# Patient Record
Sex: MALE | Race: ASIAN | NOT HISPANIC OR LATINO | URBAN - METROPOLITAN AREA
[De-identification: names, ages, dates, MRNs, and addresses within clinical notes are randomized per-mention and may not be internally consistent; named-entity substitution may affect disease eponyms.]

---

## 2017-09-25 ENCOUNTER — OFFICE VISIT - HEALTHEAST (OUTPATIENT)
Dept: FAMILY MEDICINE | Facility: CLINIC | Age: 22
End: 2017-09-25

## 2017-09-25 DIAGNOSIS — M10.9 GOUT: ICD-10-CM

## 2017-09-25 DIAGNOSIS — M25.562 ACUTE PAIN OF LEFT KNEE: ICD-10-CM

## 2017-09-25 ASSESSMENT — MIFFLIN-ST. JEOR: SCORE: 1746.93

## 2017-10-18 ENCOUNTER — RECORDS - HEALTHEAST (OUTPATIENT)
Dept: ADMINISTRATIVE | Facility: OTHER | Age: 22
End: 2017-10-18

## 2017-10-18 ENCOUNTER — OFFICE VISIT - HEALTHEAST (OUTPATIENT)
Dept: FAMILY MEDICINE | Facility: CLINIC | Age: 22
End: 2017-10-18

## 2017-10-18 DIAGNOSIS — M54.9 MID BACK PAIN ON LEFT SIDE: ICD-10-CM

## 2017-10-19 ENCOUNTER — RECORDS - HEALTHEAST (OUTPATIENT)
Dept: ADMINISTRATIVE | Facility: OTHER | Age: 22
End: 2017-10-19

## 2018-06-19 ENCOUNTER — COMMUNICATION - HEALTHEAST (OUTPATIENT)
Dept: SCHEDULING | Facility: CLINIC | Age: 23
End: 2018-06-19

## 2018-06-19 ENCOUNTER — OFFICE VISIT - HEALTHEAST (OUTPATIENT)
Dept: FAMILY MEDICINE | Facility: CLINIC | Age: 23
End: 2018-06-19

## 2018-06-19 DIAGNOSIS — M10.9 GOUT: ICD-10-CM

## 2018-06-19 DIAGNOSIS — F17.200 TOBACCO USE DISORDER: ICD-10-CM

## 2018-06-19 NOTE — ASSESSMENT & PLAN NOTE
Daily social smoking.  Pre-contemplative  Motivational interviewing, discussed risk of smoking, likely negron of addiction

## 2018-06-19 NOTE — ASSESSMENT & PLAN NOTE
Most likely diagnosis given pre-existing condition and acute arthritis MCP joint #1 and #2 Colchicine  Follow-up: 1 month to consider allopurinol  Comment: Handout given on dietary measures

## 2018-06-21 ENCOUNTER — OFFICE VISIT - HEALTHEAST (OUTPATIENT)
Dept: FAMILY MEDICINE | Facility: CLINIC | Age: 23
End: 2018-06-21

## 2018-06-21 DIAGNOSIS — M13.10 ACUTE MONOARTHRITIS: ICD-10-CM

## 2018-06-21 LAB
C REACTIVE PROTEIN LHE: 0.6 MG/DL (ref 0–0.8)
ERYTHROCYTE [DISTWIDTH] IN BLOOD BY AUTOMATED COUNT: 12.6 % (ref 11–14.5)
HCT VFR BLD AUTO: 46.3 % (ref 40–54)
HGB BLD-MCNC: 15.2 G/DL (ref 14–18)
MCH RBC QN AUTO: 28.9 PG (ref 27–34)
MCHC RBC AUTO-ENTMCNC: 32.9 G/DL (ref 32–36)
MCV RBC AUTO: 88 FL (ref 80–100)
PLATELET # BLD AUTO: 255 THOU/UL (ref 140–440)
PMV BLD AUTO: 7.2 FL (ref 7–10)
RBC # BLD AUTO: 5.28 MILL/UL (ref 4.4–6.2)
WBC: 7.8 THOU/UL (ref 4–11)

## 2018-06-21 NOTE — ASSESSMENT & PLAN NOTE
Inflammatory, MCP joint right thumb  Responding well to colchicine, patient with history of presumed gout  Afebrile, white blood cell count normal, CRP pending.  X-ray ordered but not done today.  He is coming back tomorrow morning for this.  Will make an addendum  Plan: Switch to prednisone thumb spica splint  Follow-up: With x-ray results.  Probably rest with the splint, prednisone and follow-up with rheumatology if not improving

## 2018-06-22 ENCOUNTER — RECORDS - HEALTHEAST (OUTPATIENT)
Dept: GENERAL RADIOLOGY | Facility: CLINIC | Age: 23
End: 2018-06-22

## 2018-06-22 DIAGNOSIS — M13.10 MONOARTHRITIS, NOT ELSEWHERE CLASSIFIED, UNSPECIFIED SITE: ICD-10-CM

## 2018-06-28 ENCOUNTER — COMMUNICATION - HEALTHEAST (OUTPATIENT)
Dept: FAMILY MEDICINE | Facility: CLINIC | Age: 23
End: 2018-06-28

## 2018-06-28 ENCOUNTER — AMBULATORY - HEALTHEAST (OUTPATIENT)
Dept: FAMILY MEDICINE | Facility: CLINIC | Age: 23
End: 2018-06-28

## 2018-06-28 DIAGNOSIS — M10.9 GOUT ATTACK: ICD-10-CM

## 2018-07-18 ENCOUNTER — COMMUNICATION - HEALTHEAST (OUTPATIENT)
Dept: FAMILY MEDICINE | Facility: CLINIC | Age: 23
End: 2018-07-18

## 2018-07-18 DIAGNOSIS — M10.9 GOUT ATTACK: ICD-10-CM

## 2018-08-09 ENCOUNTER — OFFICE VISIT - HEALTHEAST (OUTPATIENT)
Dept: FAMILY MEDICINE | Facility: CLINIC | Age: 23
End: 2018-08-09

## 2018-08-09 ENCOUNTER — RECORDS - HEALTHEAST (OUTPATIENT)
Dept: GENERAL RADIOLOGY | Facility: CLINIC | Age: 23
End: 2018-08-09

## 2018-08-09 DIAGNOSIS — M79.645 PAIN IN LEFT FINGER(S): ICD-10-CM

## 2018-08-09 DIAGNOSIS — M79.645 FINGER PAIN, LEFT: ICD-10-CM

## 2018-08-16 ENCOUNTER — OFFICE VISIT - HEALTHEAST (OUTPATIENT)
Dept: FAMILY MEDICINE | Facility: CLINIC | Age: 23
End: 2018-08-16

## 2018-08-16 DIAGNOSIS — M21.42 PES PLANUS OF BOTH FEET: ICD-10-CM

## 2018-08-16 DIAGNOSIS — M1A.9XX1 TOPHACEOUS GOUT: ICD-10-CM

## 2018-08-16 DIAGNOSIS — M21.41 PES PLANUS OF BOTH FEET: ICD-10-CM

## 2018-08-16 LAB
ALBUMIN SERPL-MCNC: 4.1 G/DL (ref 3.5–5)
ALP SERPL-CCNC: 82 U/L (ref 45–120)
ALT SERPL W P-5'-P-CCNC: 37 U/L (ref 0–45)
ANION GAP SERPL CALCULATED.3IONS-SCNC: 10 MMOL/L (ref 5–18)
AST SERPL W P-5'-P-CCNC: 21 U/L (ref 0–40)
BASOPHILS # BLD AUTO: 0.1 THOU/UL (ref 0–0.2)
BASOPHILS NFR BLD AUTO: 1 % (ref 0–2)
BILIRUB SERPL-MCNC: 0.5 MG/DL (ref 0–1)
BUN SERPL-MCNC: 16 MG/DL (ref 8–22)
CALCIUM SERPL-MCNC: 9.9 MG/DL (ref 8.5–10.5)
CHLORIDE BLD-SCNC: 108 MMOL/L (ref 98–107)
CO2 SERPL-SCNC: 24 MMOL/L (ref 22–31)
CREAT SERPL-MCNC: 0.82 MG/DL (ref 0.7–1.3)
EOSINOPHIL # BLD AUTO: 0.2 THOU/UL (ref 0–0.4)
EOSINOPHIL NFR BLD AUTO: 3 % (ref 0–6)
ERYTHROCYTE [DISTWIDTH] IN BLOOD BY AUTOMATED COUNT: 12.2 % (ref 11–14.5)
GFR SERPL CREATININE-BSD FRML MDRD: >60 ML/MIN/1.73M2
GLUCOSE BLD-MCNC: 87 MG/DL (ref 70–125)
HCT VFR BLD AUTO: 44 % (ref 40–54)
HGB BLD-MCNC: 14.6 G/DL (ref 14–18)
LYMPHOCYTES # BLD AUTO: 2.5 THOU/UL (ref 0.8–4.4)
LYMPHOCYTES NFR BLD AUTO: 34 % (ref 20–40)
MCH RBC QN AUTO: 28.2 PG (ref 27–34)
MCHC RBC AUTO-ENTMCNC: 33.2 G/DL (ref 32–36)
MCV RBC AUTO: 85 FL (ref 80–100)
MONOCYTES # BLD AUTO: 0.6 THOU/UL (ref 0–0.9)
MONOCYTES NFR BLD AUTO: 8 % (ref 2–10)
NEUTROPHILS # BLD AUTO: 4.1 THOU/UL (ref 2–7.7)
NEUTROPHILS NFR BLD AUTO: 55 % (ref 50–70)
PLATELET # BLD AUTO: 230 THOU/UL (ref 140–440)
PMV BLD AUTO: 7.3 FL (ref 7–10)
POTASSIUM BLD-SCNC: 4.2 MMOL/L (ref 3.5–5)
PROT SERPL-MCNC: 7.7 G/DL (ref 6–8)
RBC # BLD AUTO: 5.17 MILL/UL (ref 4.4–6.2)
SODIUM SERPL-SCNC: 142 MMOL/L (ref 136–145)
URATE SERPL-MCNC: 13.2 MG/DL (ref 3–8)
WBC: 7.5 THOU/UL (ref 4–11)

## 2018-08-17 ENCOUNTER — COMMUNICATION - HEALTHEAST (OUTPATIENT)
Dept: FAMILY MEDICINE | Facility: CLINIC | Age: 23
End: 2018-08-17

## 2018-08-21 ENCOUNTER — OFFICE VISIT - HEALTHEAST (OUTPATIENT)
Dept: FAMILY MEDICINE | Facility: CLINIC | Age: 23
End: 2018-08-21

## 2018-08-21 DIAGNOSIS — M25.562 ACUTE PAIN OF LEFT KNEE: ICD-10-CM

## 2018-08-21 DIAGNOSIS — M25.862 PATELLOFEMORAL DYSFUNCTION OF LEFT KNEE: ICD-10-CM

## 2018-08-23 ENCOUNTER — COMMUNICATION - HEALTHEAST (OUTPATIENT)
Dept: FAMILY MEDICINE | Facility: CLINIC | Age: 23
End: 2018-08-23

## 2018-08-28 ENCOUNTER — COMMUNICATION - HEALTHEAST (OUTPATIENT)
Dept: FAMILY MEDICINE | Facility: CLINIC | Age: 23
End: 2018-08-28

## 2018-08-28 DIAGNOSIS — M1A.9XX1 TOPHACEOUS GOUT: ICD-10-CM

## 2018-09-26 ENCOUNTER — OFFICE VISIT - HEALTHEAST (OUTPATIENT)
Dept: FAMILY MEDICINE | Facility: CLINIC | Age: 23
End: 2018-09-26

## 2018-09-26 DIAGNOSIS — F17.200 TOBACCO USE DISORDER: ICD-10-CM

## 2018-09-26 DIAGNOSIS — M10.9 GOUT: ICD-10-CM

## 2018-09-26 DIAGNOSIS — Z00.00 HEALTHCARE MAINTENANCE: ICD-10-CM

## 2018-09-26 LAB
ALT SERPL W P-5'-P-CCNC: 91 U/L (ref 0–45)
CREAT SERPL-MCNC: 0.81 MG/DL (ref 0.7–1.3)
ERYTHROCYTE [DISTWIDTH] IN BLOOD BY AUTOMATED COUNT: 11.6 % (ref 11–14.5)
GFR SERPL CREATININE-BSD FRML MDRD: >60 ML/MIN/1.73M2
HCT VFR BLD AUTO: 45.6 % (ref 40–54)
HGB BLD-MCNC: 14.7 G/DL (ref 14–18)
MCH RBC QN AUTO: 27.3 PG (ref 27–34)
MCHC RBC AUTO-ENTMCNC: 32.3 G/DL (ref 32–36)
MCV RBC AUTO: 85 FL (ref 80–100)
PLATELET # BLD AUTO: 300 THOU/UL (ref 140–440)
PMV BLD AUTO: 7.2 FL (ref 7–10)
RBC # BLD AUTO: 5.39 MILL/UL (ref 4.4–6.2)
URATE SERPL-MCNC: 13.4 MG/DL (ref 3–8)
WBC: 11.4 THOU/UL (ref 4–11)

## 2018-09-26 NOTE — ASSESSMENT & PLAN NOTE
Smoking cessation  Greater than 5 minutes spent discussing smoking cessation today.  Patient is in the contemplation - ambivalent about change regarding quitting.  Patient has quit before, most recent times got irritable when he tried to quit  Motivational interview technique used to explore patients desire to quit.  In the end, decided on the following intervention  Will set quit date in next 2 weeks?  Not ready for this

## 2018-09-26 NOTE — ASSESSMENT & PLAN NOTE
MTP joints, right foot, 2 weeks.  Takes allopurinol daily, previously colchicine as needed  Not sure what has worked well in the past for outbreaks but knows that naproxen has not    Plan  Continue daily allopurinol and check uric acid  Add daily colchicine for prevention  Check ALT, creatinine and hemogram  Prednisone today for acute attack, refills for future use  Discussed side effects of this medication

## 2018-09-30 ENCOUNTER — COMMUNICATION - HEALTHEAST (OUTPATIENT)
Dept: FAMILY MEDICINE | Facility: CLINIC | Age: 23
End: 2018-09-30

## 2018-10-18 ENCOUNTER — COMMUNICATION - HEALTHEAST (OUTPATIENT)
Dept: FAMILY MEDICINE | Facility: CLINIC | Age: 23
End: 2018-10-18

## 2018-11-07 ENCOUNTER — OFFICE VISIT - HEALTHEAST (OUTPATIENT)
Dept: FAMILY MEDICINE | Facility: CLINIC | Age: 23
End: 2018-11-07

## 2018-11-07 DIAGNOSIS — Z00.00 HEALTHCARE MAINTENANCE: ICD-10-CM

## 2018-11-07 DIAGNOSIS — M10.021 ACUTE IDIOPATHIC GOUT OF RIGHT ELBOW: ICD-10-CM

## 2018-11-07 NOTE — ASSESSMENT & PLAN NOTE
With current exacerbation.  Right elbow  Unable to get colchicine covered, prednisonenot effective, naproxen not effective, will go with 50 mg indomethacin  Medications taken for gout:  Uric acid lowering: Allopurinol, continue this, check labs next follow-up  Lab Results   Component Value Date    URICACID 13.4 (H) 09/26/2018     Lab Results   Component Value Date    CREATININE 0.81 09/26/2018     Rescue/relief: Indomethacin will be tried at this time  Plan: Treat acute attack, follow-up 3-month

## 2018-11-30 ENCOUNTER — OFFICE VISIT - HEALTHEAST (OUTPATIENT)
Dept: FAMILY MEDICINE | Facility: CLINIC | Age: 23
End: 2018-11-30

## 2018-11-30 DIAGNOSIS — M25.571 RIGHT ANKLE PAIN, UNSPECIFIED CHRONICITY: ICD-10-CM

## 2018-11-30 ASSESSMENT — MIFFLIN-ST. JEOR: SCORE: 1806.24

## 2018-12-03 ENCOUNTER — COMMUNICATION - HEALTHEAST (OUTPATIENT)
Dept: FAMILY MEDICINE | Facility: CLINIC | Age: 23
End: 2018-12-03

## 2018-12-03 ENCOUNTER — OFFICE VISIT - HEALTHEAST (OUTPATIENT)
Dept: FAMILY MEDICINE | Facility: CLINIC | Age: 23
End: 2018-12-03

## 2018-12-03 DIAGNOSIS — M10.9 ACUTE GOUT OF RIGHT ANKLE, UNSPECIFIED CAUSE: ICD-10-CM

## 2018-12-03 LAB — URATE SERPL-MCNC: 8.6 MG/DL (ref 3–8)

## 2018-12-11 ENCOUNTER — OFFICE VISIT - HEALTHEAST (OUTPATIENT)
Dept: INTERNAL MEDICINE | Facility: CLINIC | Age: 23
End: 2018-12-11

## 2018-12-11 DIAGNOSIS — M25.511 ACUTE PAIN OF RIGHT SHOULDER: ICD-10-CM

## 2018-12-11 ASSESSMENT — MIFFLIN-ST. JEOR: SCORE: 1809.07

## 2018-12-13 ENCOUNTER — OFFICE VISIT - HEALTHEAST (OUTPATIENT)
Dept: FAMILY MEDICINE | Facility: CLINIC | Age: 23
End: 2018-12-13

## 2018-12-13 DIAGNOSIS — M10.021 ACUTE IDIOPATHIC GOUT OF RIGHT ELBOW: ICD-10-CM

## 2018-12-13 DIAGNOSIS — M10.011 ACUTE IDIOPATHIC GOUT OF RIGHT SHOULDER: ICD-10-CM

## 2018-12-13 NOTE — ASSESSMENT & PLAN NOTE
This patient most likely has gout  Previous MTP joint swelling, however gradual onset  Very elevated uric acid    Inconsistencies with improvement with anti-inflammatories    He now is having frequent outbreaks of what ever intra-articular process is causing him problem  Presume this is gout with instigation of allopurinol treatment causing recurrent outbreaks.  Patient is not on national prophylactic therapy because insurance would not cover colchicine    Plan  For now, prolonged prednisone taper to knock out current older symptoms  Start daily colchicine, will likely require prior authorization  Referral to rheumatology  Discussed all of this with him.  Did prescribe 15 hydrocodone.  Stressed that this is not a good long-term option

## 2018-12-27 ENCOUNTER — TELEPHONE (OUTPATIENT)
Dept: RHEUMATOLOGY | Facility: CLINIC | Age: 23
End: 2018-12-27

## 2018-12-27 NOTE — TELEPHONE ENCOUNTER
REFERRAL INFORMATION            Referring provider: Evens Buenrostro MD    Referring Clinic: [facility] Phone: [phone] Fax: [phone]    Reason for Referral: idiopathic gout       RECORDS NEEDED FROM:         Clinic name Comments Records Status    [facility]  CE needed

## 2019-01-28 ENCOUNTER — TELEPHONE (OUTPATIENT)
Dept: RHEUMATOLOGY | Facility: CLINIC | Age: 24
End: 2019-01-28

## 2019-01-28 NOTE — TELEPHONE ENCOUNTER
1/28/2019  11:37 AM        RN Care Coordinator Encounter   This RN CC attempted to connect with patient for follow up on Rheumatology referral by Dr. Buenrostro at [facility] for idiopathic gout. No answer. Left a voicemail for patient to contact this RN directly at [phone] for further review of the intake process.         Evelyn Calderon RN, BSN, PHN  [facility]  RN Care Coordinator Medicine Specialty Pool Nurse   (Nephrology, Rheumatology, Infectious Disease & Hepatology)

## 2019-01-30 ENCOUNTER — OFFICE VISIT - HEALTHEAST (OUTPATIENT)
Dept: FAMILY MEDICINE | Facility: CLINIC | Age: 24
End: 2019-01-30

## 2019-01-30 ENCOUNTER — COMMUNICATION - HEALTHEAST (OUTPATIENT)
Dept: FAMILY MEDICINE | Facility: CLINIC | Age: 24
End: 2019-01-30

## 2019-01-30 DIAGNOSIS — M10.011 ACUTE IDIOPATHIC GOUT OF RIGHT SHOULDER: ICD-10-CM

## 2019-01-30 NOTE — ASSESSMENT & PLAN NOTE
Recurrence of right shoulder pain arthritis, presumed gout  Prednisone 50 mg for 5 days, refill Vicodin No. 15    Attempt forprior authorization for colchicine to add allopurinol for prophylaxis    Patient is not yet seen or heard from the rheumatologist.  They attempted to contact him according to the chart.  Will have him work with our  to try to get an appointment.

## 2019-02-01 NOTE — TELEPHONE ENCOUNTER
2/1/2019  10:27 AM         RN Care Coordinator Encounter   2nd attempt to contact patient for referral intake process to the Rheumatology clinic. NO answer. Intake process form will be sent via mail. Will wait for patient to return call or mail back intake form questionnaire.        Evelyn Calderon RN, BSN, PHN  [facility]  RN Care Coordinator Medicine Specialty Pool Nurse   (Nephrology, Rheumatology, Infectious Disease & Hepatology)

## 2019-03-15 ENCOUNTER — AMBULATORY - HEALTHEAST (OUTPATIENT)
Dept: FAMILY MEDICINE | Facility: CLINIC | Age: 24
End: 2019-03-15

## 2019-03-15 ENCOUNTER — COMMUNICATION - HEALTHEAST (OUTPATIENT)
Dept: FAMILY MEDICINE | Facility: CLINIC | Age: 24
End: 2019-03-15

## 2019-03-19 ENCOUNTER — OFFICE VISIT - HEALTHEAST (OUTPATIENT)
Dept: FAMILY MEDICINE | Facility: CLINIC | Age: 24
End: 2019-03-19

## 2019-03-19 DIAGNOSIS — M10.9 ACUTE GOUT OF LEFT WRIST, UNSPECIFIED CAUSE: ICD-10-CM

## 2019-03-28 ENCOUNTER — COMMUNICATION - HEALTHEAST (OUTPATIENT)
Dept: FAMILY MEDICINE | Facility: CLINIC | Age: 24
End: 2019-03-28

## 2019-03-28 DIAGNOSIS — M10.9 ACUTE GOUT OF LEFT WRIST, UNSPECIFIED CAUSE: ICD-10-CM

## 2019-04-02 ENCOUNTER — TELEPHONE (OUTPATIENT)
Dept: RHEUMATOLOGY | Facility: CLINIC | Age: 24
End: 2019-04-02

## 2019-04-02 NOTE — TELEPHONE ENCOUNTER
Referral has been received in the Rheumatology clinic on 3/15/2019 from Evens Buenrostro for gout of right shoulder. Our referral process is as follows:     The Rheumatology Clinic will follow up with the patient in 2-3 weeks to start the intake process by completing an intake form and discuss their medical history with them over the phone. In some cases, a Rheumatologist here at [facility] may not be the right doctor to for the patient. Alternative options will be suggested if that is the case.     Records from the referring provider are available via Care Everywhere for   [facility].     Dulce Lemons CMA  4/2/2019 3:02 PM

## 2019-04-05 ENCOUNTER — COMMUNICATION - HEALTHEAST (OUTPATIENT)
Dept: FAMILY MEDICINE | Facility: CLINIC | Age: 24
End: 2019-04-05

## 2019-04-26 NOTE — TELEPHONE ENCOUNTER
Spoke with patient who stated that he was not able to talk right now and will call back the clinic when he can.  Dulce Lemons CMA  4/26/2019 3:33 PM

## 2019-06-11 ENCOUNTER — OFFICE VISIT - HEALTHEAST (OUTPATIENT)
Dept: FAMILY MEDICINE | Facility: CLINIC | Age: 24
End: 2019-06-11

## 2019-06-11 DIAGNOSIS — M10.011 ACUTE IDIOPATHIC GOUT OF RIGHT SHOULDER: ICD-10-CM

## 2019-06-11 DIAGNOSIS — M10.9 ACUTE GOUTY ARTHRITIS: ICD-10-CM

## 2019-06-11 NOTE — ASSESSMENT & PLAN NOTE
Right knee-arthrocentesis proven  Patient recently seen, treated with indomethacin and tramadol.  He has not picked up any of the medications and complains that pain is quite severe.  Suggest that when he is treated with Vicodin works well so that he can function until the prednisone works.    Still, he is not focusing on reduction in inflammation with each episode.  He isnot giving anti-inflammatories a real chance     For this episode, I increase the number of tramadol that were provided by the emergency room to 20.  Renewed indomethacin, see assessment for gout for further plan

## 2019-06-11 NOTE — ASSESSMENT & PLAN NOTE
Gout Is very poorly controlled  Patient not on any medications  Medications taken for gout:  Uric acid lowering: None, previously on allopurinol which brought his uric acid level to what is listed below from 13.  Hesitant to use this medication because it seems to cause gout.  Has not been able to couplet with colchicine because of his insurance, but insurance may be changing soon  Lab Results   Component Value Date    URICACID 8.6 (H) 12/03/2018     Lab Results   Component Value Date    CREATININE 0.81 09/26/2018     Rescue/relief: Prescribed indomethacin today  Plan: Treat current outbreak in right knee with indomethacin  Prescribed colchicine 0.6 mg, begin whenever possible  Start allopurinol in 2 weeks, 300 mg daily, colchicine for 6 months to get allopurinol going.  Follow-up 6 weeks, recheck uric acid, discussed goal of uric acid less than 6

## 2019-07-29 ENCOUNTER — OFFICE VISIT - HEALTHEAST (OUTPATIENT)
Dept: FAMILY MEDICINE | Facility: CLINIC | Age: 24
End: 2019-07-29

## 2019-07-29 DIAGNOSIS — M10.9 ACUTE GOUT OF LEFT WRIST, UNSPECIFIED CAUSE: ICD-10-CM

## 2019-08-06 ENCOUNTER — RECORDS - HEALTHEAST (OUTPATIENT)
Dept: FAMILY MEDICINE | Facility: CLINIC | Age: 24
End: 2019-08-06

## 2019-08-06 ENCOUNTER — COMMUNICATION - HEALTHEAST (OUTPATIENT)
Dept: FAMILY MEDICINE | Facility: CLINIC | Age: 24
End: 2019-08-06

## 2019-08-28 ENCOUNTER — COMMUNICATION - HEALTHEAST (OUTPATIENT)
Dept: FAMILY MEDICINE | Facility: CLINIC | Age: 24
End: 2019-08-28

## 2019-08-28 DIAGNOSIS — M10.9 ACUTE GOUT OF LEFT WRIST, UNSPECIFIED CAUSE: ICD-10-CM

## 2019-09-09 ENCOUNTER — COMMUNICATION - HEALTHEAST (OUTPATIENT)
Dept: FAMILY MEDICINE | Facility: CLINIC | Age: 24
End: 2019-09-09

## 2019-09-09 DIAGNOSIS — M10.9 ACUTE GOUT OF LEFT WRIST, UNSPECIFIED CAUSE: ICD-10-CM

## 2019-09-25 ENCOUNTER — OFFICE VISIT - HEALTHEAST (OUTPATIENT)
Dept: FAMILY MEDICINE | Facility: CLINIC | Age: 24
End: 2019-09-25

## 2019-09-25 DIAGNOSIS — M10.9 ACUTE GOUTY ARTHRITIS: ICD-10-CM

## 2019-10-01 ENCOUNTER — OFFICE VISIT - HEALTHEAST (OUTPATIENT)
Dept: FAMILY MEDICINE | Facility: CLINIC | Age: 24
End: 2019-10-01

## 2019-10-01 DIAGNOSIS — Z09 HOSPITAL DISCHARGE FOLLOW-UP: ICD-10-CM

## 2019-10-01 DIAGNOSIS — M10.011 ACUTE IDIOPATHIC GOUT OF RIGHT SHOULDER: ICD-10-CM

## 2019-10-01 DIAGNOSIS — M10.9 ACUTE GOUTY ARTHRITIS: ICD-10-CM

## 2019-10-01 LAB
ALT SERPL W P-5'-P-CCNC: 44 U/L (ref 0–45)
CREAT SERPL-MCNC: 0.76 MG/DL (ref 0.7–1.3)
ERYTHROCYTE [DISTWIDTH] IN BLOOD BY AUTOMATED COUNT: 12.7 % (ref 11–14.5)
GFR SERPL CREATININE-BSD FRML MDRD: >60 ML/MIN/1.73M2
HCT VFR BLD AUTO: 41.7 % (ref 40–54)
HGB BLD-MCNC: 13.6 G/DL (ref 14–18)
MCH RBC QN AUTO: 26.8 PG (ref 27–34)
MCHC RBC AUTO-ENTMCNC: 32.5 G/DL (ref 32–36)
MCV RBC AUTO: 82 FL (ref 80–100)
PLATELET # BLD AUTO: 319 THOU/UL (ref 140–440)
PMV BLD AUTO: 6.9 FL (ref 7–10)
RBC # BLD AUTO: 5.06 MILL/UL (ref 4.4–6.2)
URATE SERPL-MCNC: 6.6 MG/DL (ref 3–8)
WBC: 11.1 THOU/UL (ref 4–11)

## 2019-10-01 ASSESSMENT — MIFFLIN-ST. JEOR: SCORE: 1745.57

## 2019-10-01 NOTE — ASSESSMENT & PLAN NOTE
Gout Is not well controlled     Medications taken for gout:  Uric acid lowering: Allopurinol 300, now states that he has had 1 month adherence  Lab Results   Component Value Date    URICACID 10.4 (H) 08/27/2019     Lab Results   Component Value Date    CREATININE 0.81 08/27/2019     Rescue/relief: Indomethacin/prednisone  Plan: Patient is going to follow-up with rheumatology.  In the meantime, I did check labs today.  Discussed goal of uric acid less than 6 and if we can get there with indomethacin, sitter either probenecid or, Uloric

## 2019-10-02 ENCOUNTER — COMMUNICATION - HEALTHEAST (OUTPATIENT)
Dept: FAMILY MEDICINE | Facility: CLINIC | Age: 24
End: 2019-10-02

## 2019-10-30 ENCOUNTER — OFFICE VISIT - HEALTHEAST (OUTPATIENT)
Dept: FAMILY MEDICINE | Facility: CLINIC | Age: 24
End: 2019-10-30

## 2019-10-30 DIAGNOSIS — M62.830 BACK MUSCLE SPASM: ICD-10-CM

## 2019-11-18 ENCOUNTER — OFFICE VISIT - HEALTHEAST (OUTPATIENT)
Dept: FAMILY MEDICINE | Facility: CLINIC | Age: 24
End: 2019-11-18

## 2019-11-18 DIAGNOSIS — S49.91XA INJURY OF RIGHT UPPER EXTREMITY, INITIAL ENCOUNTER: ICD-10-CM

## 2019-11-18 DIAGNOSIS — S40.811A ABRASION OF RIGHT UPPER EXTREMITY, INITIAL ENCOUNTER: ICD-10-CM

## 2019-11-18 DIAGNOSIS — M25.421 EFFUSION OF RIGHT ELBOW: ICD-10-CM

## 2019-12-04 ENCOUNTER — OFFICE VISIT - HEALTHEAST (OUTPATIENT)
Dept: FAMILY MEDICINE | Facility: CLINIC | Age: 24
End: 2019-12-04

## 2019-12-04 DIAGNOSIS — M10.041 ACUTE IDIOPATHIC GOUT OF RIGHT HAND: ICD-10-CM

## 2019-12-05 ENCOUNTER — COMMUNICATION - HEALTHEAST (OUTPATIENT)
Dept: ADMINISTRATIVE | Facility: CLINIC | Age: 24
End: 2019-12-05

## 2019-12-05 DIAGNOSIS — M10.041 ACUTE IDIOPATHIC GOUT OF RIGHT HAND: ICD-10-CM

## 2020-01-09 ENCOUNTER — OFFICE VISIT - HEALTHEAST (OUTPATIENT)
Dept: FAMILY MEDICINE | Facility: CLINIC | Age: 25
End: 2020-01-09

## 2020-01-09 DIAGNOSIS — M1A.9XX1 TOPHACEOUS GOUT: ICD-10-CM

## 2020-02-20 ENCOUNTER — OFFICE VISIT - HEALTHEAST (OUTPATIENT)
Dept: FAMILY MEDICINE | Facility: CLINIC | Age: 25
End: 2020-02-20

## 2020-02-20 DIAGNOSIS — M1A.09X0 CHRONIC GOUT OF MULTIPLE SITES, UNSPECIFIED CAUSE: ICD-10-CM

## 2020-02-20 DIAGNOSIS — M1A.9XX1 TOPHACEOUS GOUT: ICD-10-CM

## 2020-10-22 ENCOUNTER — OFFICE VISIT - HEALTHEAST (OUTPATIENT)
Dept: FAMILY MEDICINE | Facility: CLINIC | Age: 25
End: 2020-10-22

## 2020-10-22 DIAGNOSIS — M10.9 ACUTE GOUTY ARTHRITIS: ICD-10-CM

## 2020-12-24 ENCOUNTER — OFFICE VISIT - HEALTHEAST (OUTPATIENT)
Dept: FAMILY MEDICINE | Facility: CLINIC | Age: 25
End: 2020-12-24

## 2020-12-24 DIAGNOSIS — M10.061 ACUTE IDIOPATHIC GOUT OF RIGHT KNEE: ICD-10-CM

## 2021-01-05 ENCOUNTER — OFFICE VISIT - HEALTHEAST (OUTPATIENT)
Dept: FAMILY MEDICINE | Facility: CLINIC | Age: 26
End: 2021-01-05

## 2021-01-05 DIAGNOSIS — M10.9 ACUTE GOUTY ARTHRITIS: ICD-10-CM

## 2021-01-20 ENCOUNTER — OFFICE VISIT - HEALTHEAST (OUTPATIENT)
Dept: FAMILY MEDICINE | Facility: CLINIC | Age: 26
End: 2021-01-20

## 2021-01-20 DIAGNOSIS — M10.032 ACUTE IDIOPATHIC GOUT OF LEFT WRIST: ICD-10-CM

## 2021-01-20 LAB
ALBUMIN SERPL-MCNC: 4.2 G/DL (ref 3.5–5)
ALP SERPL-CCNC: 80 U/L (ref 45–120)
ALT SERPL W P-5'-P-CCNC: 34 U/L (ref 0–45)
ANION GAP SERPL CALCULATED.3IONS-SCNC: 9 MMOL/L (ref 5–18)
AST SERPL W P-5'-P-CCNC: 24 U/L (ref 0–40)
BASOPHILS # BLD AUTO: 0.1 THOU/UL (ref 0–0.2)
BASOPHILS NFR BLD AUTO: 1 % (ref 0–2)
BILIRUB SERPL-MCNC: 0.4 MG/DL (ref 0–1)
BUN SERPL-MCNC: 16 MG/DL (ref 8–22)
CALCIUM SERPL-MCNC: 9.2 MG/DL (ref 8.5–10.5)
CHLORIDE BLD-SCNC: 105 MMOL/L (ref 98–107)
CO2 SERPL-SCNC: 29 MMOL/L (ref 22–31)
CREAT SERPL-MCNC: 0.81 MG/DL (ref 0.7–1.3)
EOSINOPHIL # BLD AUTO: 0.3 THOU/UL (ref 0–0.4)
EOSINOPHIL NFR BLD AUTO: 3 % (ref 0–6)
ERYTHROCYTE [DISTWIDTH] IN BLOOD BY AUTOMATED COUNT: 13.7 % (ref 11–14.5)
GFR SERPL CREATININE-BSD FRML MDRD: >60 ML/MIN/1.73M2
GLUCOSE BLD-MCNC: 90 MG/DL (ref 70–125)
HCT VFR BLD AUTO: 45.2 % (ref 40–54)
HGB BLD-MCNC: 15.1 G/DL (ref 14–18)
LYMPHOCYTES # BLD AUTO: 1.9 THOU/UL (ref 0.8–4.4)
LYMPHOCYTES NFR BLD AUTO: 19 % (ref 20–40)
MCH RBC QN AUTO: 28.8 PG (ref 27–34)
MCHC RBC AUTO-ENTMCNC: 33.4 G/DL (ref 32–36)
MCV RBC AUTO: 86 FL (ref 80–100)
MONOCYTES # BLD AUTO: 0.9 THOU/UL (ref 0–0.9)
MONOCYTES NFR BLD AUTO: 9 % (ref 2–10)
NEUTROPHILS # BLD AUTO: 7 THOU/UL (ref 2–7.7)
NEUTROPHILS NFR BLD AUTO: 69 % (ref 50–70)
PLATELET # BLD AUTO: 227 THOU/UL (ref 140–440)
PMV BLD AUTO: 7.6 FL (ref 7–10)
POTASSIUM BLD-SCNC: 4.4 MMOL/L (ref 3.5–5)
PROT SERPL-MCNC: 7.8 G/DL (ref 6–8)
RBC # BLD AUTO: 5.25 MILL/UL (ref 4.4–6.2)
RHEUMATOID FACT SERPL-ACNC: <15 IU/ML (ref 0–30)
SODIUM SERPL-SCNC: 143 MMOL/L (ref 136–145)
URATE SERPL-MCNC: 12.4 MG/DL (ref 3–8)
WBC: 10.2 THOU/UL (ref 4–11)

## 2021-01-21 LAB — CCP AB SER IA-ACNC: <0.5 U/ML

## 2021-02-09 ENCOUNTER — OFFICE VISIT - HEALTHEAST (OUTPATIENT)
Dept: RHEUMATOLOGY | Facility: CLINIC | Age: 26
End: 2021-02-09

## 2021-03-07 ENCOUNTER — OFFICE VISIT - HEALTHEAST (OUTPATIENT)
Dept: FAMILY MEDICINE | Facility: CLINIC | Age: 26
End: 2021-03-07

## 2021-03-07 DIAGNOSIS — M10.9 ACUTE GOUT OF LEFT HAND, UNSPECIFIED CAUSE: ICD-10-CM

## 2021-03-15 ENCOUNTER — OFFICE VISIT - HEALTHEAST (OUTPATIENT)
Dept: RHEUMATOLOGY | Facility: CLINIC | Age: 26
End: 2021-03-15

## 2021-03-15 DIAGNOSIS — M1A.9XX1 TOPHACEOUS GOUT: ICD-10-CM

## 2021-03-15 DIAGNOSIS — M10.9 ACUTE GOUTY ARTHRITIS: ICD-10-CM

## 2021-03-15 DIAGNOSIS — M25.50 MULTIPLE JOINT PAIN: ICD-10-CM

## 2021-04-26 ENCOUNTER — COMMUNICATION - HEALTHEAST (OUTPATIENT)
Dept: RHEUMATOLOGY | Facility: CLINIC | Age: 26
End: 2021-04-26

## 2021-04-26 DIAGNOSIS — M10.9 ACUTE GOUTY ARTHRITIS: ICD-10-CM

## 2021-04-26 DIAGNOSIS — M1A.9XX1 TOPHACEOUS GOUT: ICD-10-CM

## 2021-04-29 RX ORDER — TRAMADOL HYDROCHLORIDE 50 MG/1
TABLET ORAL
Qty: 60 TABLET | Refills: 0 | Status: SHIPPED | OUTPATIENT
Start: 2021-04-29 | End: 2022-02-14

## 2021-05-04 ENCOUNTER — RECORDS - HEALTHEAST (OUTPATIENT)
Dept: RHEUMATOLOGY | Facility: CLINIC | Age: 26
End: 2021-05-04

## 2021-05-06 ENCOUNTER — COMMUNICATION - HEALTHEAST (OUTPATIENT)
Dept: RHEUMATOLOGY | Facility: CLINIC | Age: 26
End: 2021-05-06

## 2021-05-06 ENCOUNTER — OFFICE VISIT - HEALTHEAST (OUTPATIENT)
Dept: RHEUMATOLOGY | Facility: CLINIC | Age: 26
End: 2021-05-06

## 2021-05-06 DIAGNOSIS — M1A.9XX1 TOPHACEOUS GOUT: ICD-10-CM

## 2021-05-06 DIAGNOSIS — M10.9 ACUTE GOUTY ARTHRITIS: ICD-10-CM

## 2021-05-06 DIAGNOSIS — M25.50 MULTIPLE JOINT PAIN: ICD-10-CM

## 2021-05-06 RX ORDER — PREDNISONE 10 MG/1
TABLET ORAL
Qty: 50 TABLET | Refills: 0 | Status: SHIPPED | OUTPATIENT
Start: 2021-05-06 | End: 2023-02-09

## 2021-05-06 RX ORDER — ALLOPURINOL 300 MG/1
TABLET ORAL
Qty: 180 TABLET | Refills: 0 | Status: SHIPPED | OUTPATIENT
Start: 2021-05-06 | End: 2023-02-09

## 2021-05-26 VITALS
HEART RATE: 85 BPM | TEMPERATURE: 98.1 F | SYSTOLIC BLOOD PRESSURE: 137 MMHG | OXYGEN SATURATION: 97 % | RESPIRATION RATE: 12 BRPM | DIASTOLIC BLOOD PRESSURE: 94 MMHG

## 2021-05-27 VITALS
HEART RATE: 78 BPM | RESPIRATION RATE: 16 BRPM | OXYGEN SATURATION: 98 % | DIASTOLIC BLOOD PRESSURE: 83 MMHG | SYSTOLIC BLOOD PRESSURE: 135 MMHG

## 2021-05-27 VITALS
OXYGEN SATURATION: 97 % | HEART RATE: 70 BPM | RESPIRATION RATE: 16 BRPM | SYSTOLIC BLOOD PRESSURE: 129 MMHG | TEMPERATURE: 98.2 F | DIASTOLIC BLOOD PRESSURE: 81 MMHG

## 2021-05-27 VITALS
DIASTOLIC BLOOD PRESSURE: 101 MMHG | RESPIRATION RATE: 16 BRPM | OXYGEN SATURATION: 100 % | TEMPERATURE: 98.9 F | SYSTOLIC BLOOD PRESSURE: 150 MMHG | HEART RATE: 84 BPM

## 2021-05-27 VITALS
RESPIRATION RATE: 14 BRPM | OXYGEN SATURATION: 98 % | SYSTOLIC BLOOD PRESSURE: 148 MMHG | DIASTOLIC BLOOD PRESSURE: 77 MMHG | HEART RATE: 92 BPM | TEMPERATURE: 98.4 F

## 2021-05-27 VITALS
RESPIRATION RATE: 16 BRPM | OXYGEN SATURATION: 99 % | HEART RATE: 85 BPM | SYSTOLIC BLOOD PRESSURE: 143 MMHG | DIASTOLIC BLOOD PRESSURE: 97 MMHG

## 2021-05-27 NOTE — TELEPHONE ENCOUNTER
Left message #2 at [phone]. Sending letter out and postponing task out to 2 weeks and will try again if an appointment hasn't been made.

## 2021-05-27 NOTE — TELEPHONE ENCOUNTER
Patient reports gout is back and he is requesting a refill of medication for pain relief.    Controlled Substance Refill Request  Medication Name:   Requested Prescriptions     Pending Prescriptions Disp Refills     HYDROcodone-acetaminophen 5-325 mg per tablet 8 tablet 0     Sig: Take 1 tablet by mouth every 4 (four) hours as needed for pain.     Date Last Fill: 3/19/2019  Pharmacy: [facility] #[id number]      Submit electronically to pharmacy  Controlled Substance Agreement Date Scanned:   Encounter-Level CSA Scan Date:    There are no encounter-level csa scan date.       Last office visit with prescriber/PCP: 1/30/2019 Evens Buenrostro MD OR same dept: 1/30/2019 Evens Buenrostro MD OR same specialty: 1/30/2019 Evens Buenrostro MD  Last physical: Visit date not found Last MTM visit: Visit date not found

## 2021-05-27 NOTE — TELEPHONE ENCOUNTER
RN cannot approve Refill Request    RN can NOT refill this medication med is not covered by policy/route to provider. Last office visit: 1/30/2019 Evens Buenrostro MD Last Physical: Visit date not found Last MTM visit: Visit date not found Last visit same specialty: 1/30/2019 Evens Buenrostro MD.  Next visit within 3 mo: Visit date not found  Next physical within 3 mo: Visit date not found      Ramona Javier, [facility] Triage/Med Refill 3/28/2019    Requested Prescriptions   Pending Prescriptions Disp Refills     predniSONE (DELTASONE) 20 MG tablet 18 tablet 0     Sig: Take 3 tablets once daily for 3 days, 2 tabs once daily for 3 days, then one tab once daily for 3 days..    There is no refill protocol information for this order

## 2021-05-29 NOTE — PROGRESS NOTES
Assessment/ Plan  Problem List Items Addressed This Visit        Unprioritized    Gout     Gout Is very poorly controlled  Patient not on any medications  Medications taken for gout:  Uric acid lowering: None, previously on allopurinol which brought his uric acid level to what is listed below from 13.  Hesitant to use this medication because it seems to cause gout.  Has not been able to couplet with colchicine because of his insurance, but insurance may be changing soon  Lab Results   Component Value Date    URICACID 8.6 (H) 12/03/2018     Lab Results   Component Value Date    CREATININE 0.81 09/26/2018     Rescue/relief: Prescribed indomethacin today  Plan: Treat current outbreak in right knee with indomethacin  Prescribed colchicine 0.6 mg, begin whenever possible  Start allopurinol in 2 weeks, 300 mg daily, colchicine for 6 months to get allopurinol going.  Follow-up 6 weeks, recheck uric acid, discussed goal of uric acid less than 6         Acute gouty arthritis     Right knee-arthrocentesis proven  Patient recently seen, treated with indomethacin and tramadol.  He has not picked up any of the medications and complains that pain is quite severe.  Suggest that when he is treated with Vicodin works well so that he can function until the prednisone works.    Still, he is not focusing on reduction in inflammation with each episode.  He is not giving anti-inflammatories a real chance     For this episode, I increase the number of tramadol that were provided by the emergency room to 20.  Renewed indomethacin, see assessment for gout for further plan         Relevant Medications    traMADol (ULTRAM) 50 mg tablet    indomethacin (INDOCIN) 50 MG capsule    colchicine 0.6 mg tablet        Subjective  CC:  Chief Complaint   Patient presents with     Gout     right knee [id number]     HPI:  Patient with recurrence of right knee gout.  Seen in emergency room where arthrocentesis done which was consistent with acute gout.  He  was placed on tramadol and indomethacin.    Patient's been seen a number of times for gout.    Indicates that when he takes Vicodin and prednisone this is what really makes his pain go away quickly and wishes to have Vicodin.    He has been on allopurinol in the past but this seems to bring on gout, particularly without colchicine which is not been covered by insurance.  Colchicine in the past is been used for attacks but unfortunately needs to pay about $10 a pill for it.  Is planning to change insurance.    Reviewed uric acid levels.  He is somewhat fatalistic about getting better.    PFSH:  Patient Active Problem List   Diagnosis     Gout     Tobacco use disorder     Acute monoarthritis     Healthcare maintenance     Tophaceous gout     Acute gouty arthritis     S/P arthrocentesis     Current medications reviewed as follows:  Current Outpatient Medications on File Prior to Visit   Medication Sig     ibuprofen (ADVIL,MOTRIN) 200 MG tablet Take 200 mg by mouth every 6 (six) hours as needed for pain.     acetaminophen (TYLENOL) 160 mg/5 mL (5 mL) Soln solution Take 27.3 mL (872 mg total) by mouth every 4 (four) hours as needed.     allopurinol (ZYLOPRIM) 300 MG tablet Take 1 tablet (300 mg total) by mouth daily.     HYDROcodone-acetaminophen 5-325 mg per tablet Take 1 tablet by mouth every 4 (four) hours as needed for pain.     ibuprofen (ADVIL,MOTRIN) 600 MG tablet Take 1 tablet (600 mg total) by mouth every 8 (eight) hours as needed for pain.     predniSONE (DELTASONE) 20 MG tablet Take 3 tablets once daily for 3 days, 2 tabs once daily for 3 days, then one tab once daily for 3 days..     [DISCONTINUED] colchicine 0.6 mg tablet Take 1 tablet (0.6 mg total) by mouth daily.     [DISCONTINUED] indomethacin (INDOCIN) 50 MG capsule Take 1 capsule (50 mg total) by mouth 3 (three) times a day with meals.     [DISCONTINUED] traMADol (ULTRAM) 50 mg tablet Take 1 tablet (50 mg total) by mouth every 8 (eight) hours as needed  for pain.     No current facility-administered medications on file prior to visit.      Social History     Tobacco Use   Smoking Status Former Smoker     Types: Cigarettes   Smokeless Tobacco Never Used   Tobacco Comment    5 cigarettes of marlboro reds per day     Social History     Social History Narrative     Not on file     Patient Care Team:  Evens Buenrostro MD as PCP - General (Family Medicine)  ROS  Full 10 system review including constitutional, respiratory, cardiac, gi, urinary, rheumatologic, neurologic, reproductive, dermatologic psychiatric is  performed  and is otherwise negative         Objective  Physical Exam  Vitals:    06/11/19 1522   BP: 126/80   Patient Site: Left Arm   Patient Position: Sitting   Cuff Size: Adult Large   Pulse: 80   Resp: 16   Weight: 197 lb (89.4 kg)     Body mass index is 30.85 kg/m .  Swollen warm right knee  No other abnormalities  Gen- alert, oriented/ appropriately responsive  HEENT- normal cephalic, atraumatic.   Chest- Normal inspiration and expiration.    Clear to ascultation.    No chest wall deformity or scar.  CV- Heart regular rate and rhythm  normal tones, no murmurs   No gallops or rubs.  Ext- appear well perfused, no edema  Skin- warm and dry,   no visualized rash    Diagnostics:   None today      Please note: Voice recognition software was used in this dictation.  It may therefore contain typographical errors.

## 2021-05-31 VITALS — WEIGHT: 189.8 LBS | BODY MASS INDEX: 30.63 KG/M2

## 2021-05-31 VITALS — BODY MASS INDEX: 29.38 KG/M2 | HEIGHT: 66 IN | WEIGHT: 182.8 LBS

## 2021-05-31 NOTE — TELEPHONE ENCOUNTER
RN cannot approve Refill Request    RN can NOT refill this medication med is not covered by policy/route to provider. Last office visit: 6/11/2019 Evens Buenrostro MD Last Physical: Visit date not found Last MTM visit: Visit date not found Last visit same specialty: 6/11/2019 Evens Buenrostro MD.  Next visit within 3 mo: Visit date not found  Next physical within 3 mo: Visit date not found      Grisel Garcia, [facility] Triage/Med Refill 8/28/2019    Requested Prescriptions   Pending Prescriptions Disp Refills     predniSONE (DELTASONE) 20 MG tablet [Pharmacy Med Name: PREDNISONE 20MG TABLETS] 18 tablet 0     Sig: TAKE 3 TABLETS BY MOUTH DAILY FOR 3 DAYS X 2 TABLETS BY MOUTH DAILY FOR 3 DAYS THEN 1 TABLET DAILY FOR 3 DAYS.       There is no refill protocol information for this order

## 2021-05-31 NOTE — TELEPHONE ENCOUNTER
Who is calling:  Patient  Reason for Call:  He needs copies of his last 3 rounds of lab work copies and left at . Lab results from 7/24, 7/21 and 5/27/19.  Please let him know when reports are ready for .  Date of last appointment with primary care: 6/11/19  Okay to leave a detailed message: No

## 2021-06-01 VITALS — WEIGHT: 200.5 LBS | BODY MASS INDEX: 32.36 KG/M2

## 2021-06-01 VITALS — BODY MASS INDEX: 31.64 KG/M2 | WEIGHT: 196 LBS

## 2021-06-01 VITALS — BODY MASS INDEX: 31.96 KG/M2 | WEIGHT: 198 LBS

## 2021-06-01 VITALS — BODY MASS INDEX: 32.44 KG/M2 | WEIGHT: 201 LBS

## 2021-06-01 VITALS — WEIGHT: 198.5 LBS | BODY MASS INDEX: 32.04 KG/M2

## 2021-06-01 NOTE — TELEPHONE ENCOUNTER
We had plan to treat gout outbreaks with indomethacin.  I presume this is what the request is for.  Needs to follow-up.  His contact and make appointment

## 2021-06-01 NOTE — TELEPHONE ENCOUNTER
Attempt call 1. Left message to call back. Okay to relay providers message. Please also schedule appointment.

## 2021-06-01 NOTE — TELEPHONE ENCOUNTER
Will have Dr. Buenrostro address when he gets back tomorrow.  Sounds like this prednisone was not part of the acute gout treatment plan.

## 2021-06-01 NOTE — TELEPHONE ENCOUNTER
"Called patient and left a voice message for return call. #2  Please schedule an appointment upon return call  \"Okay to relay message\"  "

## 2021-06-01 NOTE — TELEPHONE ENCOUNTER
RN cannot approve Refill Request    RN can NOT refill this medication med is not covered by policy/route to provider. Last office visit: 6/11/2019 Evens Buenrostro MD Last Physical: Visit date not found Last MTM visit: Visit date not found Last visit same specialty: 6/11/2019 Evens Buenrostro MD.  Next visit within 3 mo: Visit date not found  Next physical within 3 mo: Visit date not found      Roxana Johnson, [facility] Triage/Med Refill 9/9/2019    Requested Prescriptions   Pending Prescriptions Disp Refills     predniSONE (DELTASONE) 20 MG tablet [Pharmacy Med Name: PREDNISONE 20MG TABLETS] 18 tablet 0     Sig: TAKE 3 TABLETS DAILY FOR 3 DAYS, 2 TABLETS DAILY FOR 3 DAYS, THEN 1 TABLET DAILY FOR 3 DAYS.       There is no refill protocol information for this order

## 2021-06-01 NOTE — PROGRESS NOTES
Assessment/ Plan  Problem List Items Addressed This Visit        Unprioritized    Gout     Gout Is not well controlled     Medications taken for gout:  Uric acid lowering: Allopurinol 300, now states that he has had 1 month adherence  Lab Results   Component Value Date    URICACID 10.4 (H) 08/27/2019     Lab Results   Component Value Date    CREATININE 0.81 08/27/2019     Rescue/relief: Indomethacin/prednisone  Plan: Patient is going to follow-up with rheumatology.  In the meantime, I did check labs today.  Discussed goal of uric acid less than 6 and if we can get there with indomethacin, sitter either probenecid or, Uloric           Acute gouty arthritis    Relevant Medications    allopurinol (ZYLOPRIM) 300 MG tablet    predniSONE (DELTASONE) 20 MG tablet    colchicine 0.6 mg tablet    Other Relevant Orders    Uric Acid    ALT (SGPT)    HM2(CBC w/o Differential) (Completed)    Creatinine      Other Visit Diagnoses     Hospital discharge follow-up    -  Primary    Hospitalization 8/27 and 8/28 for right knee swelling.  Probably mechanical injury but gout crystals present in the        Subjective  CC:  Chief Complaint   Patient presents with     Hospital Visit Follow Up     HPI:  Patient was admitted for mechanical fall, pain in right shoulder, right hip and knee.  Slipped in the mud on day of admission.  Injured right shoulder right knee, had hemarthrosis right knee, tapped with gout crystals.  In by orthopedics, prescribed indomethacin, had mild leukocytosis, mild elevation of CRP but trending down.  Doubt that problem with hemarthrosis was due to acute gout exacerbation due to improving CRP and decreasing uric acid compared to a few weeks ago.  Was prescribed indomethacin and allopurinol.      Patient indicates that he had been referred to [place] [facility]ology and is now taking his allopurinol.  Few weeks since last gouty attack.  Is to follow-up with rheumatology but requesting that his uric acid be checked.  I  reviewed his recent uric acids here and discuss goal of 6  PFSH:  Patient Active Problem List   Diagnosis     Gout     Tobacco use disorder     Acute monoarthritis     Healthcare maintenance     Tophaceous gout     Acute gouty arthritis     S/P arthrocentesis     Hemarthrosis     Current medications reviewed as follows:  Current Outpatient Medications on File Prior to Visit   Medication Sig     naproxen (NAPROSYN) 500 MG tablet Take 1 tablet (500 mg total) by mouth 2 (two) times a day with meals.     [DISCONTINUED] allopurinol (ZYLOPRIM) 300 MG tablet Take 1 tablet (300 mg total) by mouth daily.     [DISCONTINUED] predniSONE (DELTASONE) 20 MG tablet Take 3 tabs once daily for 3 days, then 2 tabs once daily for 3 days, then 1 tab once daily for 3 days..     HYDROcodone-acetaminophen 5-325 mg per tablet Take 1 tablet by mouth every 6 (six) hours as needed for pain.     indomethacin (INDOCIN) 50 MG capsule Take 1 capsule (50 mg total) by mouth 3 (three) times a day with meals. For 3 days then change to three times a day as needed for pain     omeprazole (PRILOSEC) 20 MG capsule Take 1 capsule (20 mg total) by mouth daily before breakfast.     No current facility-administered medications on file prior to visit.      Social History     Tobacco Use   Smoking Status Former Smoker     Types: Cigarettes     Last attempt to quit: 2018     Years since quittin.8   Smokeless Tobacco Never Used   Tobacco Comment    5 cigarettes of marlboro reds per day     Social History     Patient does not qualify to have social determinant information on file (likely too young).   Social History Narrative     Not on file     Patient Care Team:  Evens Buenrostro MD as PCP - General (Family Medicine)  Evens Buenrostro MD as Assigned PCP  ROS  Full 10 system review including constitutional, respiratory, cardiac, gi, urinary, rheumatologic, neurologic, reproductive, dermatologic psychiatric is  performed  and is otherwise  "negative         Objective  Physical Exam  Vitals:    10/01/19 0912   BP: 110/60   Patient Site: Left Arm   Patient Position: Sitting   Cuff Size: Adult Regular   Pulse: 78   Resp: 14   Temp: 98.4  F (36.9  C)   TempSrc: Oral   Weight: 179 lb (81.2 kg)   Height: 5' 7\" (1.702 m)     Body mass index is 28.04 kg/m .  Gen- alert, oriented/ appropriately responsive, obese  HEENT- normal cephalic, atraumatic.   Chest- Normal inspiration and expiration.    Clear to ascultation.    No chest wall deformity or scar.  CV- Heart regular rate and rhythm  normal tones, no murmurs   No gallops or rubs.  Ext- appear well perfused, no edema.  Knees appear without significant effusion  Skin- warm and dry,   no visualized rash    Diagnostics:   Results for orders placed or performed in visit on 10/01/19   Uric Acid   Result Value Ref Range    Uric Acid 6.6 3.0 - 8.0 mg/dL   ALT (SGPT)   Result Value Ref Range    ALT 44 0 - 45 U/L   HM2(CBC w/o Differential)   Result Value Ref Range    WBC 11.1 (H) 4.0 - 11.0 thou/uL    RBC 5.06 4.40 - 6.20 mill/uL    Hemoglobin 13.6 (L) 14.0 - 18.0 g/dL    Hematocrit 41.7 40.0 - 54.0 %    MCV 82 80 - 100 fL    MCH 26.8 (L) 27.0 - 34.0 pg    MCHC 32.5 32.0 - 36.0 g/dL    RDW 12.7 11.0 - 14.5 %    Platelets 319 140 - 440 thou/uL    MPV 6.9 (L) 7.0 - 10.0 fL   Creatinine   Result Value Ref Range    Creatinine 0.76 0.70 - 1.30 mg/dL    GFR MDRD Af Amer >60 >60 mL/min/1.73m2    GFR MDRD Non Af Amer >60 >60 mL/min/1.73m2           Please note: Voice recognition software was used in this dictation.  It may therefore contain typographical errors.      "

## 2021-06-01 NOTE — PROGRESS NOTES
Chief Complaint   Patient presents with     Leg Pain     states lower lt legs pain x 1 day     Hand Pain     RT pinky swelling denies injury         HPI      Patient is here for the following issues:    #1. Right hand pain - at pinky finger x 3 days, with swelling. No injury, redness, fever, chills.     #2. Right lower leg pain - x 1 day, just above right lateral ankle. No injury.    Patient takes meds for gout.               Objective:    Vitals:    09/25/19 0954   BP: 125/83   Pulse: 66   Temp: 98.1  F (36.7  C)   SpO2: 99%       Gen:NAD  CV: RRR  Pulm:CTAB  MSK: mild edema of right hand mostly around 5th MCP joint. No erythema. Normal inspection of right lower leg and ankle. Moderate pain to palpation of right 5th MCP joint. Normal palpation of right lower leg and ankle. Reduced ROM of right 5th MCP joint due to pain. Full ROM of right wrist, and ankle. Normal gait.         Acute gouty arthritis  -     predniSONE (DELTASONE) 20 MG tablet; Take 3 tabs once daily for 3 days, then 2 tabs once daily for 3 days, then 1 tab once daily for 3 days..  -     naproxen (NAPROSYN) 500 MG tablet; Take 1 tablet (500 mg total) by mouth 2 (two) times a day with meals.    Patient requested refill of Napropxen. Advised patient not to take Prednisone and Naproxen together.

## 2021-06-01 NOTE — TELEPHONE ENCOUNTER
Patient Returning Call  Reason for call:  Patient is returning a call.  Information relayed to patient: Below messages.  Patient has additional questions:  Yes  If YES, what are your questions/concerns:  Patient is experiencing a gout flare in his hands. Patient is asking if he can have a refill until he can be seen on 10/1/19. Unable to verify if patient is taking indomethacin or not as call disconnected.  Okay to leave a detailed message?: Yes [phone]

## 2021-06-02 VITALS — BODY MASS INDEX: 31.82 KG/M2 | WEIGHT: 203.19 LBS

## 2021-06-02 VITALS — WEIGHT: 192.38 LBS | HEIGHT: 67 IN | BODY MASS INDEX: 30.19 KG/M2

## 2021-06-02 VITALS — BODY MASS INDEX: 30.4 KG/M2 | WEIGHT: 194.1 LBS

## 2021-06-02 VITALS — HEIGHT: 67 IN | WEIGHT: 193 LBS | BODY MASS INDEX: 30.29 KG/M2

## 2021-06-02 VITALS — WEIGHT: 192.25 LBS | BODY MASS INDEX: 30.11 KG/M2

## 2021-06-02 VITALS — BODY MASS INDEX: 31.55 KG/M2 | WEIGHT: 195.5 LBS

## 2021-06-02 VITALS — WEIGHT: 192.5 LBS | BODY MASS INDEX: 30.15 KG/M2

## 2021-06-02 VITALS — BODY MASS INDEX: 30.97 KG/M2 | WEIGHT: 197.75 LBS

## 2021-06-02 NOTE — PATIENT INSTRUCTIONS - HE
Apply ice pack to the right mid back every couple hours for 10-15 minutes  Toradol was given in the office - do not take any naproxen, aleve, ibuprofen, indomethacin for the rest of the day  Start the medrol dose jaspreet (steroid medication) as directed  Cyclobenzaprine every 8 hours for muscle spasm - this will make you drowsy so you may want to use only at night if having to work in the day  Hydrocodone (vicodin) one at bedtime for pain if needed.   Recheck if worse or no better

## 2021-06-02 NOTE — PROGRESS NOTES
Assessment/Plan:   Back muscle spasm  Woke with right mid back/scapular pain 5 days ago. 2 nights ago started having intermittent muscle spasms in that area. No benefit with indocin and tylenol. No known injury. Not sleeping now due to pain and muscle spasms. Exam consistent with muscle pain and spasm right scapular area. Lungs clear. No spine pain with percussion. Arms and legs normal neuro and strength, no rash. Gout is quiet. Eating and drinking normally. Toradol given in the office. Recommend flexeril, ice throughout today. Then flexeril at night only while working. Vicodin given for bedtime use only for a couple days to help with pain relief #3 tabs only.   - ketorolac injection 30 mg (TORADOL)  - methylPREDNISolone (MEDROL, NURIA,) 4 mg tablet; Daily as per package directions  Dispense: 21 tablet; Refill: 0  - cyclobenzaprine (FLEXERIL) 10 MG tablet; Take 1 tablet (10 mg total) by mouth 3 (three) times a day as needed for muscle spasms.  Dispense: 30 tablet; Refill: 0  - HYDROcodone-acetaminophen 5-325 mg per tablet; One tablet at bedtime as needed for pain  Dispense: 3 tablet; Refill: 0    Apply ice pack to the right mid back every couple hours for 10-15 minutes  Toradol was given in the office - do not take any naproxen, aleve, ibuprofen, indomethacin for the rest of the day  Start the medrol dose nuria (steroid medication) as directed  Cyclobenzaprine every 8 hours for muscle spasm - this will make you drowsy so you may want to use only at night if having to work in the day  Hydrocodone (vicodin) one at bedtime for pain if needed.   Recheck if worse or no better     Subjective:      Katya Purdy is a 24 y.o. male with history of gout who presents with mid back pain and muscle spasm. He woke Saturday morning with right mid back/scapular pain (5 days ago). 2 nights ago started having intermittent muscle spasms in that area around the right shoulder blade to the neck and mid back. It does hurt more to take a  deep breath but otherwise has had no cough or shortness of breath. No URI or ST. No rash or paresthesia. No radiation to head or down the right arm. Arm movement can increase pain a little bit. Neck motion does not bother too much. No benefit with indocin and tylenol. No known injury. He had been lifting boxes on Friday but had no pain at that time. Not sleeping now due to pain and muscle spasms.  Has trouble working due to pain and spasms triggered by moving the back, but he 'has to' work.   No HA or vision changes, no N/V/D. Remaining joints are fine, gout is quiet.   Appetite has been fine, no change in diet, eating and drinking as usual.   Former smoker.   NKDA    Current Outpatient Medications on File Prior to Visit   Medication Sig Dispense Refill     allopurinol (ZYLOPRIM) 300 MG tablet Take 1 tablet (300 mg total) by mouth daily. 30 tablet 0     colchicine 0.6 mg tablet 1 tab po qd 30 tablet 6     indomethacin (INDOCIN) 50 MG capsule Take 1 capsule (50 mg total) by mouth 3 (three) times a day with meals. For 3 days then change to three times a day as needed for pain 20 capsule 0     naproxen (NAPROSYN) 500 MG tablet Take 1 tablet (500 mg total) by mouth 2 (two) times a day with meals. 30 tablet 0     omeprazole (PRILOSEC) 20 MG capsule Take 1 capsule (20 mg total) by mouth daily before breakfast. 7 capsule 0     predniSONE (DELTASONE) 20 MG tablet Take 3 tabs once daily for 3 days, then 2 tabs once daily for 3 days, then 1 tab once daily for 3 days.. 18 tablet 0     No current facility-administered medications on file prior to visit.      Patient Active Problem List   Diagnosis     Gout     Tobacco use disorder     Acute monoarthritis     Healthcare maintenance     Tophaceous gout     Acute gouty arthritis     S/P arthrocentesis     Hemarthrosis       Objective:     /78   Pulse 67   Temp 98.3  F (36.8  C) (Oral)   Resp 12   Wt 174 lb 14.4 oz (79.3 kg)   SpO2 96%   BMI 27.39 kg/m       Physical  General Appearance: Alert, cooperative, no distress but uncomfortable  Head: Normocephalic, without obvious abnormality, atraumatic  Eyes: Conjunctivae are normal.   Nose: No significant congestion.  Throat: lips pink and moist  Neck/back: No adenopathy. No pain in the spine with percussion neck to sacrum. Normal neck range of motion with out pain, no muscle tenderness or spasm in the neck. Trapezius not tender at the neck, occiput or shoulder. There is muscle spasm and tenderness palpable around the scapula. There are intermittent spontaneous spasms/cramps in the muscle which radiate to the neck and around the right chest and last just a brief time.    Lungs: Clear to auscultation bilaterally, equal breath sounds, deep breaths trigger spasm and pain in right mid back, respirations unlabored  Heart: Regular rate and rhythm  Abdomen: Soft, non-tender  Extremities: No lower extremity edema. No acute joint inflammations, strength normal BUE, BLE.   Skin:  no rashes or lesions  Psychiatric: Patient has a normal mood and affect.

## 2021-06-03 VITALS
HEIGHT: 67 IN | SYSTOLIC BLOOD PRESSURE: 110 MMHG | TEMPERATURE: 98.4 F | DIASTOLIC BLOOD PRESSURE: 60 MMHG | BODY MASS INDEX: 28.09 KG/M2 | HEART RATE: 78 BPM | RESPIRATION RATE: 14 BRPM | WEIGHT: 179 LBS

## 2021-06-03 VITALS
HEART RATE: 66 BPM | DIASTOLIC BLOOD PRESSURE: 83 MMHG | SYSTOLIC BLOOD PRESSURE: 125 MMHG | WEIGHT: 176.2 LBS | TEMPERATURE: 98.1 F | BODY MASS INDEX: 26.79 KG/M2 | OXYGEN SATURATION: 99 %

## 2021-06-03 VITALS — BODY MASS INDEX: 30.85 KG/M2 | WEIGHT: 197 LBS

## 2021-06-03 VITALS
DIASTOLIC BLOOD PRESSURE: 87 MMHG | WEIGHT: 178.3 LBS | RESPIRATION RATE: 12 BRPM | OXYGEN SATURATION: 96 % | TEMPERATURE: 98.7 F | HEART RATE: 100 BPM | BODY MASS INDEX: 27.93 KG/M2 | SYSTOLIC BLOOD PRESSURE: 131 MMHG

## 2021-06-03 VITALS
SYSTOLIC BLOOD PRESSURE: 128 MMHG | WEIGHT: 174.9 LBS | DIASTOLIC BLOOD PRESSURE: 78 MMHG | BODY MASS INDEX: 27.39 KG/M2 | OXYGEN SATURATION: 96 % | TEMPERATURE: 98.3 F | RESPIRATION RATE: 12 BRPM | HEART RATE: 67 BPM

## 2021-06-03 VITALS — WEIGHT: 192.13 LBS | BODY MASS INDEX: 30.09 KG/M2

## 2021-06-04 VITALS
DIASTOLIC BLOOD PRESSURE: 91 MMHG | BODY MASS INDEX: 27.3 KG/M2 | HEART RATE: 73 BPM | OXYGEN SATURATION: 100 % | RESPIRATION RATE: 16 BRPM | TEMPERATURE: 98.2 F | SYSTOLIC BLOOD PRESSURE: 144 MMHG | WEIGHT: 174.3 LBS

## 2021-06-04 VITALS
DIASTOLIC BLOOD PRESSURE: 94 MMHG | SYSTOLIC BLOOD PRESSURE: 147 MMHG | RESPIRATION RATE: 12 BRPM | WEIGHT: 167.4 LBS | OXYGEN SATURATION: 99 % | BODY MASS INDEX: 26.22 KG/M2 | TEMPERATURE: 98.3 F | HEART RATE: 78 BPM

## 2021-06-04 NOTE — TELEPHONE ENCOUNTER
Pt was told by [facility] pharmacy that they could not fill the Rx sent by Dr Nevarez due to a missing credential of some sort.  They told him he would have to come to the clinic and have another provider sent the Rx in order for it to be filled.  Per Dr. Santiago, the pharmacy does not open until 8:00 and due to being extremely busy this morning, they would address it after 8:00.  Please call pt with any issues or questions: [phone]

## 2021-06-04 NOTE — TELEPHONE ENCOUNTER
Spoke with patient by phone.  Apologized for the issue that resulted in his prescription for hydrocodone/acetaminophen being unavailable.  Informed him that a new order for this medication was sent to his pharmacy earlier today.  Patient voiced understanding and appreciation for the phone call.    Karan Santiago MD 12/05/19 12:06 PM

## 2021-06-12 NOTE — PROGRESS NOTES
OUTPATIENT VISIT NOTE                                                   Date of Visit: 10/22/2020     Chief Complaint   Chief Complaint   Patient presents with     Knee Pain     bilateral knee pains after finishing meds given by ER last week, patient was instructed to see specialist and has not made this apt as advised and cannot get into PCP to discuss ongoing issues          History of Present Illness   Katya Purdy is a 25 y.o. male c/o bilateral knee pain--states flare up of his gout.  Red last week, but not now. Swollen up last week but better now.  No fever.  States he has had trouble with gout since he was 17.  He states he has had joint fluid analysis that has shown gout.  He states that last week he had his first flare up in about 6 Months.  6 months ago he had a 3 week course of prednisone and things settled down.  He states he saw rheumatology sometime about one year ago and thinks he received some kind of infusion.  Has had allopurinol but has been out of it for ?eight months.  States he had been on it about two months at that time--hadn't had it before.  States he has used indomethacin, naproxen, cochicine without success.  States only prednisone helps him.       Review of Systems   A 10 point comprehensive review of systems was negative except as noted.      MEDICATIONS   Current Outpatient Medications on File Prior to Visit   Medication Sig Dispense Refill     [DISCONTINUED] allopurinol (ZYLOPRIM) 300 MG tablet Take 1 tablet (300 mg total) by mouth daily. 30 tablet 0     [DISCONTINUED] allopurinoL (ZYLOPRIM) 300 MG tablet Take 1 tablet (300 mg total) by mouth daily. 20 tablet 0     [DISCONTINUED] HYDROcodone-acetaminophen 5-325 mg per tablet Take 1 tablet by mouth every 4 (four) hours as needed for pain. 15 tablet 0     [DISCONTINUED] HYDROcodone-acetaminophen 5-325 mg per tablet Take 1 tablet by mouth every 4 (four) hours as needed for pain. 10 tablet 0     [DISCONTINUED] HYDROcodone-acetaminophen  5-325 mg per tablet Take 1 tablet by mouth every 6 (six) hours as needed for pain. 5 tablet 0     [DISCONTINUED] ibuprofen (ADVIL,MOTRIN) 800 MG tablet Take 1 tablet (800 mg total) by mouth 3 (three) times a day. 21 tablet 0     [DISCONTINUED] indomethacin (INDOCIN) 25 MG capsule Take 1 capsule (25 mg total) by mouth 3 (three) times a day as needed (pain). 21 capsule 0     [DISCONTINUED] methylPREDNISolone (MEDROL DOSEPACK) 4 mg tablet Follow package directions 21 tablet 0     [DISCONTINUED] predniSONE (DELTASONE) 20 MG tablet Take 40 mg by mouth daily for 5 days. 10 tablet 0     No current facility-administered medications on file prior to visit.          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 No erythematous joints or synovitis.  Left knee effusion.  FROM of knee joints     Diagnostic Studies     Pertinent History     The following portions of the patient's history were reviewed and updated as appropriate: allergies, current medications, past family history, past medical history, past social history, past surgical history and problem list.     Assessment and Plan   1. Acute gouty arthritis  allopurinoL (ZYLOPRIM) 300 MG tablet    predniSONE (DELTASONE) 20 MG tablet    Ambulatory referral to Rheumatology      Discussed with patient that he needs to be on allopurinol so this was refilled.  Discussed the long term side effects from prednisone.  I did give him a five day course.  Discussed that he needs to have regular follow up with his primary  doctor.  Discussed that he needs to see rheumatology due to the early onset and continual problems that he had had with the gout.      Counseled regarding assessment and plan for evaluation and treatment. Questions were answered. See AVS for the specific written instructions that were provided at the conclusion of the visit.     Discussed signs / symptoms that warrant urgent / emergent medical attention.     Recheck if worsening or not improving.       Mainor Espana MD

## 2021-06-13 NOTE — PROGRESS NOTES
"[facility] clinic EXAM note      Chief Complaint   Patient presents with     Shoulder Pain     LT side- no injury         Assessment & Plan    Problem List Items Addressed This Visit     None      Visit Diagnoses     Mid back pain on left side    -  Primary: Seems pain around fifth/sixth rib on the left associated vertebra.  Unknown etiology has no known injury.  At this time I think has some rib somatic dysfunction.  There are no red flag signs or symptoms at this time.  We will start with Flexeril, ibuprofen and Tylenol.  Gave patient work excuse will return to work on Monday he does seem to be in some distress..  Referral to chiropractic.  Follow-up in 1 week if not feeling better worsening symptoms.  Consider chest x-ray at that time.    Relevant Medications    cyclobenzaprine (FLEXERIL) 5 MG tablet    Other Relevant Orders    Ambulatory referral to Chiropractic          History    Katya Purdy is a 22 y.o.  male who presents for the following issues:    Left shoulder pain.   Noticed it Friday during lunch at work.   Sharp pain, sudden   Went away on Saturday.   Came back Sunday- until yesterday  This morning tit \"kicked his butt.\"   No meds.   No hx of injury.   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 Has already had to miss work 2/2 pain. States he can work for a couple hours but then the pain is just too much  No numbness tinlging.   Pain is around left mid Shoulder blade.    okay.   No tearing sensation.    Hx of gout in knee. Doesn't take allopurinol.       mEDICATIONS    Current Outpatient Prescriptions on File Prior to Visit   Medication Sig Dispense Refill     allopurinol (ZYLOPRIM) 300 MG tablet Take 1 tablet (300 mg total) by mouth daily. 90 tablet 3     ibuprofen (ADVIL,MOTRIN) 200 MG tablet Take 200 mg by mouth every 6 (six) hours as needed for pain.       naproxen (NAPROSYN) 500 MG tablet Take 1 tablet (500 mg total) by mouth 2 (two) times a day as needed. 30 tablet 0     No current facility-administered " medications on file prior to visit.        Pertinent past medical, surgical, social and family history reviewed and updated in [facility].    Social History     Social History     Marital status: Single     Spouse name: N/A     Number of children: N/A     Years of education: N/A     Occupational History     Not on file.     Social History Main Topics     Smoking status: Current Every Day Smoker     Types: Cigarettes     Smokeless tobacco: Not on file      Comment: 5 cigarettes of marlboro reds per day     Alcohol use Not on file     Drug use: Not on file     Sexual activity: Not on file     Other Topics Concern     Not on file     Social History Narrative         Review of systems    ROS: 10 pt review of systems preformed and all negative except noted in HPI.     Physical Exam    /70 (Patient Site: Left Arm, Patient Position: Sitting, Cuff Size: Adult Regular)  Pulse 80  Wt 189 lb 12.8 oz (86.1 kg)  BMI 30.63 kg/m2  GEN:  22 y.o. male appears in mild distress from pain.   HEENT: EOMI, no scleral icterus, buccal mucosa moist   CHEST/LUNG: No respiratory distress, good air flow to bases, CTAB   CV: RRR, S1, S2 normal; no murmurs, rubs or gallops.   MS: No point tenderness along the clavicular or AC joint but when I press patient states he can feel the pressure in that mid back area.  He has full range of motion of both shoulder joints but again just complains of pressure in this area.  His area of concern seems to be around the left fifth sixth ribs/thoracic vertebra.  There is hypertonicity of the paraspinal muscles in this area but bilaterally.  Worse on the left.  The T6 vertebra does seem rotated left.  There is no erythema, bruising.  Feels boggy.  Webber and apprehension test negative.  Empty can test negative.  SKIN: warm, dry, no rashes or lesions  NEURO: Gait normal, coordination intact  PSYCH:  Somewhat reserved, flat. Not sure if its from the pian.       Follow-up 1 week if no improvement or worsening  symptoms.        Leila Eduardo

## 2021-06-13 NOTE — PROGRESS NOTES
Subjective:       Katya Purdy is a 22 y.o. male who presents for evaluation of left knee pain.  Patient states he had a similar pain 4-5 weeks ago, but that this resolved spontaneously.  Now for the past 3 days, he has had increasing left knee pain along with difficulty flexing or extending the knee.  He states that this will swell intermittently, denies any warmth or redness to the knee.  He has been taking naproxen with minimal relief.  He denies any clicking or popping, denies instability, has had no recent injury.  Interestingly, upon review of the electronic medical record, patient had a similar presentation in his right knee 2 years ago.  At that time he was worked up for possible pseudogout, as he already carried a diagnosis of gout.  He was offered arthrocentesis at that time, but declined.  He states that he has had this in the past in his ankle, this was exquisitely painful and so he does not wish to have this done again.  He was treated with steroids and anti-inflammatories in the past, but states he cannot remember ever taking these.  He initially states he takes allopurinol, then states he has not taken this for quite some time.  He has gone 1.5 years without a gout attack.    The following portions of the patient's history were reviewed and updated as appropriate: allergies, current medications, past medical history and problem list.    Labs Reviewed:  Lab Results   Component Value Date    WBC 14.9 (H) 05/27/2015    HGB 15.4 05/27/2015    HCT 48.4 05/27/2015     05/27/2015    ALT 45 05/27/2015    AST 22 05/27/2015     05/27/2015    K 3.8 05/27/2015     05/27/2015    CREATININE 0.80 05/27/2015    BUN 12 05/27/2015    CO2 22 05/27/2015    TSH 1.03 05/27/2015       Review of Systems   Pertinent items are noted in HPI.      Objective:      /68 (Patient Site: Right Arm, Patient Position: Sitting, Cuff Size: Adult Regular)  Pulse 92  Temp 99  F (37.2  C) (Oral)   Resp 20  Ht  "5' 6\" (1.676 m)  Wt 182 lb 12.8 oz (82.9 kg)  BMI 29.5 kg/m2    General appearance: alert, appears stated age and cooperative  Lungs: clear to auscultation bilaterally  Heart: regular rate and rhythm, S1, S2 normal, no murmur, click, rub or gallop  Extremities: left knee with mild effusion, no warmth or redness, difficult exam due to pain, severe pain with active flexion or extension of left leg        Assessment/Plan:       1. Acute pain of left knee  Patient has had an x-ray in the past for similar presentation and this was normal.  He does have a history of gout.  Discussed that with his exquisite pain and inability to flex or extend the knee, my concern for septic arthritis is high.  He refuses arthrocentesis today.  He wishes to try anti-inflammatories.  Because we are unable to rule out septic arthritis, I declined giving him steroids today.  He will try indomethacin 3 times daily with food.  If no improvement in his symptoms within 3-5 days, I recommended arthrocentesis.  He can perform this either here or at an orthopedic walk-in clinic.  He verbalizes understanding of this plan.    2. Gout  Discussed that if anti-inflammatories are successful in treating this pain, he should resume his allopurinol once his acute flare is over.        "

## 2021-06-14 NOTE — PATIENT INSTRUCTIONS - HE
1. Take medication as prescribed according to bottle directions.  2. Ice the inflamed joint on and off for 15 minutes at a time.  3. Follow up if no improvement after 7 days.   What Is Gout?  Gout is a disease that affects the joints. Left untreated, it can lead to painful foot and joint deformities and even kidney problems. But, by treating gout early, you can relieve pain and help prevent future problems. Gout can usually be treated with medication and proper diet. In severe cases, surgery may be needed.  What causes gout?  Gout is caused by an excess of uric acid (a waste product made by the body). Uric acid is excreted by the kidneys. If the uric acid level in your blood rises too high, the uric acid may form crystals that collect in the joints, bringing on a gout attack. If you have many gout attacks, crystals may form large deposits called tophi. Tophi can damage joints and cause deformity.  Who is at risk for gout?  Men are more likely to have gout than women. But women can also be affected, mostly after menopause. Some health problems, such as obesity and high cholesterol, make gout more likely. And some medications, such as diuretics ( water pills ), can trigger a gout attack. People who drink a lot of alcohol are at high risk for gout. Certain foods can also trigger a gout attack.  Substances that may trigger a gout attack  To help prevent a gout attack, avoid these foods:    Alcohol (particularly beer, but also red wine and spirits)    Certain meats (red meat, processed meat, turkey)    Organ meats (kidney, liver, sweetbread)    Shellfish (lobster, crab, shrimp, scallop, mussel)    Certain fish (anchovy, sardine, herring, mackerel)   Treatment    Lifestyle changes, including weight loss, exercise, and quitting tobacco use    Reducing consumption of the food groups above as well as high fructose corn syrup, found in many foods including sodas and energy drinks    Changing non-essential medications that may  "contribute to gout (such as thiazide diuretics--\"water pills\")    Medications to reduce the amount of uric acid in the blood, such as allopurinol or others    Medications to treat acute gout attacks, including NSAIDs (nonsteroidal anti-inflammatory medicines), steroids, and colchicine      "

## 2021-06-14 NOTE — PROGRESS NOTES
Chief Complaint   Patient presents with     Wrist Pain     Lt wrist, gout flare up, x 3 days, painful to grab or use wrist       ASSESMENT AND PLAN  1. Acute idiopathic gout of left wrist  predniSONE (DELTASONE) 20 MG tablet    HM1(CBC and Differential)    Comprehensive Metabolic Panel    omeprazole (PRILOSEC) 20 MG capsule    Rheumatoid Factor Screen    CCP Antibodies    Uric Acid    ketorolac injection 30 mg (TORADOL)      Patient appears to have another gout flare.  This is his third in the past month.  He is previously treated with short steroid bursts and he may be experiencing rebound gout flare due to short burst.  He been given Toradol both times and is adamant about receiving it today.  I discussed with him my preference to not treat with Toradol and just use prednisone for the longer taper but he states using prednisone alone does not seem to completely resolve his symptoms and Toradol works better.  I am hesitant due to the amount of Toradol and steroids received over the last month and possibly causing adverse side effects of GI bleed.  After discussion we will start him on prophylactic omeprazole to help decrease his risk of GI bleed.  We will also be doing some lab work checking his kidneys and liver to make sure that they are functioning correctly.  Also adding on some lab work for his rheumatologist to go over with him including a rheumatoid factor and anti-CCP.  Given strict precautions that if he develops dizziness, lightheadedness, dark tarry stool, hematemesis, abdominal pain he needs to return to the clinic immediately.    -Start longer prednisone taper for 15 days  -Toradol shot in clinic today  -Omeprazole to reduce risk of GI bleed, taken each morning before breakfast  -Labs today that can be discussed with him at his rheumatology appointment in 5 days    30 minutes spent on the date of the encounter doing chart review, history and exam, documentation and further activities as noted  above    [facility]    SUBJECTIVE  HPI:  Katya Purdy is a 25 y.o. male who presents today with concerns for gout flare.  He has significant pain in his left wrist that has been worsening over the last 3 days.  He was seen 2020, 15 days ago for the same issue and treated with Toradol and prednisone.  This did improve his symptoms but feels like he always had some residual low level of pain.  His range of motion and function have significantly decreased over the last few days.  He has a rheumatology appointment in 5 days.  He denies any fevers, chills, nausea, vomiting, abdominal pain, other joint pain, lightheadedness or dizziness.    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 30 tablet 3     No current facility-administered medications on file prior to visit.         Social History     Tobacco Use     Smoking status: Former Smoker     Types: Cigarettes     Quit date: 2018     Years since quittin.1     Smokeless tobacco: Never Used     Tobacco comment: 5 cigarettes of [place] reds per day   Substance Use Topics     Alcohol use: Not Currently       ROS:  As stated in HPI    Vitals:    21 0833 21 0835   BP: 154/90 129/81   Pulse: 70    Resp: 16    Temp: 98.2  F (36.8  C)    TempSrc: Oral    SpO2: 97%        OBJECTIVE  Physical Exam:  General: Alert, No apparent distress,  Cooperative  MSK: Decreased range of motion of the left wrist with mild nonpitting edema.  Focally tender.  Pain with flexion of fingers.  No bony tenderness of the hand.  No significant heat or erythema    Labs:  Lab results pending  Recent Results (from the past 72 hour(s))   Comprehensive Metabolic Panel   Result Value Ref Range    Sodium 143 136 - 145 mmol/L    Potassium 4.4 3.5 - 5.0 mmol/L    Chloride 105 98 - 107 mmol/L    CO2 29 22 - 31 mmol/L    Anion Gap, Calculation 9 5 - 18 mmol/L    Glucose 90 70 - 125 mg/dL    BUN 16 8 - 22 mg/dL    Creatinine 0.81 0.70 - 1.30 mg/dL    GFR MDRD Af Amer >60 >60 mL/min/1.73m2    GFR MDRD Non Af Amer >60 >60 mL/min/1.73m2    Bilirubin, Total 0.4 0.0 - 1.0 mg/dL    Calcium 9.2 8.5 - 10.5 mg/dL    Protein, Total 7.8 6.0 - 8.0 g/dL    Albumin 4.2 3.5 - 5.0 g/dL    Alkaline Phosphatase 80 45 - 120 U/L    AST 24 0 - 40 U/L    ALT 34 0 - 45 U/L   Uric Acid   Result Value Ref Range    Uric Acid 12.4 (H) 3.0 - 8.0 mg/dL   HM1 (CBC with Diff)   Result Value Ref Range    WBC 10.2 4.0 - 11.0 thou/uL    RBC 5.25 4.40 - 6.20 mill/uL    Hemoglobin 15.1 14.0 - 18.0 g/dL    Hematocrit 45.2 40.0 - 54.0 %    MCV 86 80 - 100 fL    MCH 28.8 27.0 - 34.0 pg    MCHC 33.4 32.0 - 36.0 g/dL    RDW 13.7 11.0 - 14.5 %    Platelets 227 140 - 440 thou/uL    MPV 7.6 7.0 - 10.0 fL    Neutrophils % 69 50 - 70 %    Lymphocytes % 19 (L) 20 - 40 %    Monocytes % 9 2 - 10 %    Eosinophils % 3 0 - 6 %    Basophils % 1 0 - 2 %    Neutrophils Absolute 7.0 2.0 - 7.7 thou/uL    Lymphocytes Absolute 1.9 0.8 - 4.4 thou/uL    Monocytes Absolute 0.9 0.0 - 0.9 thou/uL    Eosinophils Absolute 0.3 0.0 - 0.4 thou/uL    Basophils Absolute 0.1 0.0 - 0.2 thou/uL         Juancarlos Magana PA-C

## 2021-06-14 NOTE — PROGRESS NOTES
Chief Complaint   Patient presents with     Knee Pain     HX of Gout. RT Knee pain. XStarted this morning        HPI:  Katya Purdy is a 25 y.o. male who presents today with 1 day of right knee pain.  He has a history of gout and has been experiencing a gout attack about 1/month.  He does have a follow-up visit with rheumatology on .  He is not restarted his allopurinol because he is afraid to cause an exacerbation.  He states Toradol and prednisone worked well for him.  His knee is swollen, painful and is difficult to weight-bear.  No significant injury.  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 30 tablet 3     No current facility-administered medications on file prior to visit.         Social History     Tobacco Use     Smoking status: Former Smoker     Types: Cigarettes     Quit date: 2018     Years since quittin.0     Smokeless tobacco: Never Used     Tobacco comment: 5 cigarettes of [place] reds per day   Substance Use Topics     Alcohol use: Not Currently       ROS:  As stated in HPI    Vitals:    20 1123   BP: (!) 143/97   Patient Site: Right Arm   Patient Position: Sitting   Cuff Size: Adult Regular   Pulse: 85   Resp: 16   SpO2: 99%       Physical Exam:  General: Alert, No apparent distress, Cooperative  SKIN: dry, warm, no rash or lesions seen in areas of exposed  skin  HENT: HEAD: ATNC,   EYES: Conjunctiva clear, EOMI  NECK: Supple, non tender, no cervical adenopathy  LUNGS: No respiratory distress  MSK: Right knee visibly swollen with no significant erythema, breaks in skin, dislocation or discoloration.  Has difficulty weightbearing and is using crutches.  Decreased range of motion due to swelling.  Diffuse tenderness.  No significant warmth  NUERO: AOx3, normal mentation  PSYCH: normal mood and affect    Assessment and Plan:  1. Acute idiopathic gout of right knee  predniSONE (DELTASONE) 20 MG tablet    ketorolac injection 30 mg (TORADOL)    DISCONTINUED: ketorolac injection 30 mg (TORADOL)    DISCONTINUED: ketorolac injection 30 mg (TORADOL)      Symptoms and exam consistent with gout.  Has experienced this before.  Did discuss restarting his allopurinol before seeing rheumatology and he is still hesitant to possibly cause another exacerbation.  However he seems to be having exacerbations anyway so it may be helpful.  He states he feels he did not fully recover from his last flareup and is still having some joint stiffness.     Juancarlos Magana PA-C

## 2021-06-14 NOTE — PROGRESS NOTES
Chief Complaint   Patient presents with     Gout     x1d, gout flare-up in L wrist and R knee         Clinical Decision Making: Gouty arthiritis flare. We treated with an additional round of Prednisone and Toradol today. Patient has a follow up with Rheumatology in approximately 19 days.     1. Acute gouty arthritis  ketorolac injection 30 mg (TORADOL)    predniSONE (DELTASONE) 20 MG tablet         Patient Instructions   1. Take medication as prescribed according to bottle directions.  2. Ice the inflamed joint on and off for 15 minutes at a time.  3. Follow up if no improvement after 7 days.   What Is Gout?  Gout is a disease that affects the joints. Left untreated, it can lead to painful foot and joint deformities and even kidney problems. But, by treating gout early, you can relieve pain and help prevent future problems. Gout can usually be treated with medication and proper diet. In severe cases, surgery may be needed.  What causes gout?  Gout is caused by an excess of uric acid (a waste product made by the body). Uric acid is excreted by the kidneys. If the uric acid level in your blood rises too high, the uric acid may form crystals that collect in the joints, bringing on a gout attack. If you have many gout attacks, crystals may form large deposits called tophi. Tophi can damage joints and cause deformity.  Who is at risk for gout?  Men are more likely to have gout than women. But women can also be affected, mostly after menopause. Some health problems, such as obesity and high cholesterol, make gout more likely. And some medications, such as diuretics ( water pills ), can trigger a gout attack. People who drink a lot of alcohol are at high risk for gout. Certain foods can also trigger a gout attack.  Substances that may trigger a gout attack  To help prevent a gout attack, avoid these foods:    Alcohol (particularly beer, but also red wine and spirits)    Certain meats (red meat, processed meat,  "turkey)    Organ meats (kidney, liver, sweetbread)    Shellfish (lobster, crab, shrimp, scallop, mussel)    Certain fish (anchovy, sardine, herring, mackerel)   Treatment    Lifestyle changes, including weight loss, exercise, and quitting tobacco use    Reducing consumption of the food groups above as well as high fructose corn syrup, found in many foods including sodas and energy drinks    Changing non-essential medications that may contribute to gout (such as thiazide diuretics--\"water pills\")    Medications to reduce the amount of uric acid in the blood, such as allopurinol or others    Medications to treat acute gout attacks, including NSAIDs (nonsteroidal anti-inflammatory medicines), steroids, and colchicine           HPI:  Katya Purdy is a 25 y.o. male with PMHx of tophaceous gout who presents today complaining of right knee and left wrist pain x 1 day. Patient was last seen for gout on 20. At that time he was treated with a 30 mg Toradol injection and 5 days of Prednisone. Patient stopped taking Allopurinol 300 mg daily in November because he was told to not take it during a flare. These symptoms feel consistent with another gout flare per the patient. He denies any sick symptoms such as fever or chills.     History obtained from the patient.    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  2018: Healthcare maintenance  2018-: Acute monoarthritis  2014: Gout  2014: Tobacco use disorder  Acute Sore Throat  SIRS (systemic inflammatory response syndrome) (H)      Past Medical History:   Diagnosis Date     Arthritis     gout     Gout      PDA (patent ductus arteriosus)        Social History     Tobacco Use     Smoking status: Former Smoker     Types: Cigarettes     Quit date: 2018     Years since quittin.0     Smokeless tobacco: Never Used     Tobacco comment: 5 cigarettes " of [facility] per day   Substance Use Topics     Alcohol use: Not Currently       Review of Systems   Constitutional: Negative for chills and fever.   Musculoskeletal: Positive for arthralgias (left wrist and right knee).       Vitals:    01/05/21 0829   BP: (!) 150/101   Patient Site: Right Arm   Patient Position: Sitting   Cuff Size: Adult Regular   Pulse: 84   Resp: 16   Temp: 98.9  F (37.2  C)   TempSrc: Oral   SpO2: 100%       Physical Exam  Vitals signs and nursing note reviewed.   Constitutional:       General: He is not in acute distress.     Appearance: He is well-developed. He is not diaphoretic.      Comments: Patient is holding his left wrist.   HENT:      Head: Normocephalic and atraumatic.      Right Ear: External ear normal.      Left Ear: External ear normal.   Eyes:      General:         Right eye: No discharge.         Left eye: No discharge.      Conjunctiva/sclera: Conjunctivae normal.   Pulmonary:      Effort: Pulmonary effort is normal. No respiratory distress.   Musculoskeletal:      Comments: Tophi on right hand. Painful left wrist and right knee. No severe erythema or swelling.    Neurological:      Mental Status: He is alert.   Psychiatric:         Behavior: Behavior normal.         Thought Content: Thought content normal.         Judgment: Judgment normal.                 At the end of the encounter, I discussed results, diagnosis, medications. Discussed red flags for immediate return to clinic/ER, as well as indications for follow up if no improvement. Patient understood and agreed to plan. Patient was stable for discharge.

## 2021-06-15 NOTE — PROGRESS NOTES
Pt states unable to proceed today appt and would like to reschedule to a different date and time.     Pt is schedule on March 15 at 1300.

## 2021-06-15 NOTE — PROGRESS NOTES
Katya Purdy who presents today with a chief complaint of  No chief complaint on file.      Joint Pains: Yes  Location: lt hands, lt index knuckle  Onset: 2 weeks  Intensity: 5 /10  AM Stiffness: 60 Minutes  Alleviating/Aggravating Factors: avoid eating pork help as well as jia.  Tolerating Meds: Yes  Other:      ROS:  Patient denies having: persistent dry eyes, dry mouth, recurrent oral ulcers, patchy alopecia, active rashes, photosensitivity, history of psoriasis, active chest pain, active shortness of breath, active cough, active dysuria, history of kidney stones, active abdominal pain, active diarrhea, history of hematochezia, active dysphagia, history of peptic ulcer disease, history of HIV, tuberculosis, hepatitis B or C, Lyme disease, seizure history, raynaud's, active documented fevers, recent infections, difficulty sleeping or chronic unrefreshing sleep, involuntary weight loss, loss of appetite, excessive fatigue, depression, anxiety,  recurrent sinus infections, history of inflammatory eye diseases (such as uveitis, scleritis, iritis, etc).       Information gathered by medical assistant incorporated into this note, was reviewed and discussed with the patient.    Problem List:  Patient Active Problem List   Diagnosis     Gout     Tobacco use disorder     Acute monoarthritis     Healthcare maintenance     Tophaceous gout     Acute gouty arthritis     S/P arthrocentesis     Hemarthrosis     SIRS (systemic inflammatory response syndrome) (H)        PMH:   Past Medical History:   Diagnosis Date     Arthritis     gout     Gout      PDA (patent ductus arteriosus)        Surgical History:  No past surgical history on file.    Family History:  No family history on file.    Social History:   reports that he quit smoking about 2 years ago. His smoking use included cigarettes. He has never used smokeless tobacco. He reports previous alcohol use. He reports that he does not use drugs.    Allergies:  No Known  Allergies     Current Medications:  Current Outpatient Medications   Medication Sig Dispense Refill     allopurinoL (ZYLOPRIM) 300 MG tablet Take 1 tablet (300 mg total) by mouth daily. 30 tablet 3     omeprazole (PRILOSEC) 20 MG capsule Take 1 capsule (20 mg total) by mouth daily before breakfast. 30 capsule 0     No current facility-administered medications for this visit.            Physical Exam:  Following up today via video visit, per Covid-19 pandemic requirements.    Verbal consent has been obtained for this service by care team member.    Video call start time: 1:35 PM    Video call end time:  2:11 PM    D[employer] utilized for video call.    Phone number utilized: [[phone]]    Patient location for video visit: Home     Provider location for video visit:  Home (working remotely)      Summary/Assessment:    Pleasant 26-year-old male with pertinent past medical history of gout presents today with left hand pain.    Patient explains that he has been experiencing gouty flares for the past 9 years.    On average has about 5 flares per year, sometimes once a month.  Often go to urgent/emergent care facility and treated with Toradol followed by prednisone.    Initially was not drinking alcoholic beverages, has been off of alcoholic beverages for several years now.    Denies red meat or shellfish intake.    Positive family history for gout on both sides (uncles and grandfather).    Initially big toe involved however with time has progressed to include ankles, knees, elbows, wrists and hands.    Came down with a gouty flare about 1 to 2 weeks ago, placed on prednisone 40 mg x 5 days, just took last dose.    Still has some residual discomfort and swelling involving left second MCP, noted on video exam today.  Also noted to tophaceous nodule near right third MCP joint.  Denies having other tophaceous nodules.    States when flaring not necessarily in symmetrical manner.    With regards to allopurinol, believes  started about a year and a half ago.  When flaring patient would hold his allopurinol and restart when overcoming flares.    Ibuprofen ineffective.  Indomethacin ineffective.  However as mentioned would hold allopurinol when flaring/trying these meds.    Patient last took allopurinol about 2 months ago.    Last uric acid level from January 20, 2021 (about 2 months ago) was 12.4, back in November 2020 had a level of 7.1.  Patient believes he was on allopurinol then.    States colchicine prescribed in the past however it was too expensive for him.  Patient unsure if this was a prescription for Colcrys or colchicine.    Noted to have negative/unremarkable: Rheumatoid factor, CCP antibody    Above presentation can be consistent with having acute on chronic gout.    Please see below for management plan.      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    Other:    Marital status:  Single     How many kids:  no    Type of work:  No, worked in [employer].     Drinking alcohol: no    Tobacco use: no     Recreational drug use: no        Plan:      Had a lengthy discussion with the patient regarding importance to be compliant with allopurinol including when flaring, should not discontinue and restart, as performed in the past.  Patient should remain on allopurinol through flares.  For now, should only  hold allopurinol if develops adverse reaction to medication.    For residual active arthritis affecting left second MCP, will provide prednisone taper.  Patient just completed 40 mg x 5 days.  We will have him take 30 mg for 4 days, gradually increasing every 4 days to off.    If patient experiences a flare while off of prednisone recommend trying Indocin 75 mg twice a day for 5 days followed by once a day for 5 days, while remaining on allopurinol.  If still experiencing joint pain/flare despite Indocin, and he can contact us for a prednisone taper.    Recommend continue avoiding alcoholic beverages, red meat and shellfish.    Recommend having labs checked prior to next visit.    Follow-up in 2 months.    Procedure note:       Total time spent 55 minutes involved with patient care, includes placing orders, reviewing records and and formulating management plan.    This note was transcribed using Dragon voice recognition software as a result unintentional grammatical errors or word substitutions may have occurred. Please contact our Rheumatology department if you need any clarification or if you have any related inquiries.    Thank you for referring this patient to our clinic.      Yousif Sapp DO  ....................  3/15/2021   12:47 PM

## 2021-06-15 NOTE — PROGRESS NOTES
Assessment & Plan:       1. Acute gout of left hand, unspecified cause  predniSONE (DELTASONE) 20 MG tablet    ketorolac injection 30 mg (TORADOL)      Medical Decision Making  Patient with frequent history of gout flares presents with an acute gout flare of the left hand second MCP joint.  Patient typically has good relief with oral steroids.  Otherwise no signs of bacterial cellulitis.  Instructed patient to continue allopurinol.  He has follow-up with rheumatology tomorrow.  Patient requested Toradol injection today to help with pain and discomfort.  Provided injection at the clinic, but recommended patient wait to start prednisone till tomorrow morning.  After starting the oral steroids, patient will continue to use acetaminophen as needed for pain.  Also recommended cool compresses.  Patient admitted understanding of this plan.  Discussed appropriate diet and avoidance of foods that cause flareups of gout.  Discussed signs of worsening symptoms and when to follow-up with PCP if no symptom improvement.    Patient Instructions   You were seen today for acute gout. With treatment, symptoms should be improving in 48-72 hours (or longer if symptoms have been present for several days).     Symptom management:  - If already taking a urate-lowering medication, may continue without interruption.  - Take prescribed prednisone as instructed  - May apply ice to the affected area for 10-15 minutes each hour as needed.  - Rest involved joint.    Reasons to return for immediate re-evaluation:  - Develop a fever of 100.4 or higher  - No improvement of symptoms after 5 days  - Redness and/or pain continues to spread  - Develop drainage from the knee          Subjective:       Katya Purdy is a 26 y.o. male here for evaluation of left hand pain.  Onset of symptoms was 24 hours ago.  Patient has history of chronic gout with frequent acute flareups.  This will already be patient's fourth gout flareup this year.  Patient has  appointment with rheumatology tomorrow to further discuss gout treatment and prevention of flareups.  Patient denies any known trauma or injury to the left hand.  There is redness, swelling, and tenderness at the left second MCP joint.  Patient denies fevers and purulent drainage.  Patient discontinued his allopurinol and has not been taking any medications for the discomfort since yesterday.    The following portions of the patient's history were reviewed and updated as appropriate: allergies, current medications and problem list.    Review of Systems  Pertinent items are noted in HPI.     Allergies  No Known Allergies    No family history on file.    Social History     Socioeconomic History     Marital status: Single     Spouse name: None     Number of children: None     Years of education: None     Highest education level: None   Occupational History     None   Social Needs     Financial resource strain: None     Food insecurity     Worry: None     Inability: None     Transportation needs     Medical: None     Non-medical: None   Tobacco Use     Smoking status: Former Smoker     Types: Cigarettes     Quit date: 2018     Years since quittin.2     Smokeless tobacco: Never Used     Tobacco comment: 5 cigarettes of marlboro reds per day   Substance and Sexual Activity     Alcohol use: Not Currently     Drug use: Never     Sexual activity: Not Currently     Birth control/protection: Abstinence   Lifestyle     Physical activity     Days per week: None     Minutes per session: None     Stress: None   Relationships     Social connections     Talks on phone: None     Gets together: None     Attends Christianity service: None     Active member of club or organization: None     Attends meetings of clubs or organizations: None     Relationship status: None     Intimate partner violence     Fear of current or ex partner: None     Emotionally abused: None     Physically abused: None     Forced sexual activity: None    Other Topics Concern     None   Social History Narrative     None         Objective:       /83 (Patient Site: Right Arm, Patient Position: Sitting, Cuff Size: Adult Regular)   Pulse 78   Resp 16   SpO2 98%   General appearance: alert, appears stated age, cooperative, no distress and non-toxic  Skin: Left hand dorsal, second MCP joint is swollen, tender, erythematous, and increased warmth to touch.  Skin is otherwise intact, no purulence seen

## 2021-06-15 NOTE — PATIENT INSTRUCTIONS - HE
Summary of Your Rheumatology Visit    Next Appointment:   2 Months    Medications:    Please follow directives on pill bottle on how to take medication(s) provided.    Experiencing a flare after completing prednisone taper recommend taking Indocin/indomethacin 75 mg that you have a handy.  Recommend taking 1 tablet twice a day for 5 days thereafter once a day for 5 days to off.  Indocin needs to be taken with food.  If still experiencing joint pain with swelling (if still flaring) despite Indocin/indomethacin, can contact us for another prednisone taper.    Referrals:      Tests:     Please have labs performed a few days prior to next visit.    Recommend having labs performed at a [facility] facility, as results are then automatically uploaded into our in-basket system.  If having labs performed at a [facility] facility then patient should contact/notify us soon thereafter, so we can actively retrieve results from [employer] system.        Injections:        Other:

## 2021-06-16 PROBLEM — Z00.00 HEALTHCARE MAINTENANCE: Status: ACTIVE | Noted: 2018-09-26

## 2021-06-16 PROBLEM — M13.10 ACUTE MONOARTHRITIS: Status: ACTIVE | Noted: 2018-06-21

## 2021-06-16 PROBLEM — M1A.9XX1 TOPHACEOUS GOUT: Chronic | Status: ACTIVE | Noted: 2018-11-07

## 2021-06-16 PROBLEM — M10.9 ACUTE GOUTY ARTHRITIS: Status: ACTIVE | Noted: 2018-12-13

## 2021-06-16 PROBLEM — M25.00 HEMARTHROSIS: Status: ACTIVE | Noted: 2019-08-27

## 2021-06-16 PROBLEM — Z98.890: Status: ACTIVE | Noted: 2019-05-27

## 2021-06-16 NOTE — TELEPHONE ENCOUNTER
Telephone Encounter by Nelda Madrid at 2/5/2019  8:38 AM     Author: Nelda Madrid Service: -- Author Type: --    Filed: 2/5/2019  8:40 AM Encounter Date: 1/30/2019 Status: Signed    : Nelda Madrid       PRIOR AUTHORIZATION DENIED    Denial Rational: Patient must try and fail one additional medication- probenecid/colchicine tablets 500mg-0.5mg            Appeal Information: If provider would like to appeal please provide a letter of medical necessity stating why formulary alternatives would not be clinically appropriate for the patient and route back to the PA team.

## 2021-06-16 NOTE — TELEPHONE ENCOUNTER
Telephone Encounter by Nelda Madrid at 2/4/2019  9:58 AM     Author: Nelda Madrid Service: -- Author Type: --    Filed: 2/4/2019 10:01 AM Encounter Date: 1/30/2019 Status: Signed    : Nelda Madrid       [facility] team  [phone]    PA has been initiated.

## 2021-06-17 NOTE — TELEPHONE ENCOUNTER
Lvmtcb. Please give pt a call to schedule. Thank you    Next Appointment: 10 weeks, preferable in clinic.  Please cancel follow-up visit for May 25.          Tests:      Patient has a lab appointment on May 18 recommend maintaining this lab appointment and rechecking labs again within 1 week prior to follow-up visit with us.

## 2021-06-17 NOTE — TELEPHONE ENCOUNTER
Please inquire what was his most recent prednisone taper (dose/frequency) and what dose he is currently on?

## 2021-06-17 NOTE — PATIENT INSTRUCTIONS - HE
Patient Instructions by Radha Menezes CNP at 11/18/2019  7:30 AM     Author: Radha Menezes CNP Service: -- Author Type: Nurse Practitioner    Filed: 11/18/2019  8:21 AM Encounter Date: 11/18/2019 Status: Addendum    : Radha Menezes CNP (Nurse Practitioner)    Related Notes: Original Note by Radha Menezes CNP (Nurse Practitioner) filed at 11/18/2019  8:21 AM       No broken bones!  There is fluid in your elbow from the injury and this sometimes means there is a small fracture that we can't see, so if your elbow/wrist/hand isn't 100% normal in about 10 days, recheck in your clinic.      Wear your wrist splint for comfort for a few days.      Ice and elevate your arm/hand.    Recommend icing up to 1 time per hour for the next 2 days for 20 minutes at a time, but at least 6 times today and tomorrow.      Sling for comfort for the next few days.      Rest right arm.      Clean abrasions/scratches with soap and water and apply bacitracin and bandaids to prevent infection.      Ibuprofen 600 mg every 6 hours as needed for pain.            Patient Education     Upper Extremity Contusion  You have a contusion (bruise) of an upper extremity (arm, wrist, hand, or fingers). Symptoms include pain, swelling, and skin discoloration. No bones are broken. This injury may take from a few days to a few weeks to heal.  During that time, the bruise may change from reddish in color, to purple-blue, to green-yellow, to yellow-brown.  Home care    Unless another medicine was prescribed, you can take acetaminophen, ibuprofen, or naproxen to control pain. (If you have chronic liver or kidney disease or ever had a stomach ulcer or gastrointestinal bleeding, talk with your doctor before using these medicines.)    Elevate the injured area to reduce pain and swelling. As much as possible, sit or lie down with the injured area raised about the level of your heart. This is especially important during the first 48  hours.    Ice the injured area to help reduce pain and swelling. Wrap a cold source (ice pack or ice cubes in a plastic bag) in a thin towel. Apply to the bruised area for 20 minutes every 1 to 2 hours the first day. Continue this 3 to 4 times a day until the pain and swelling goes away.    If a sling was provided, you may remove it to shower or bathe. To prevent joint stiffness, do not wear it for more than 1 week.  Follow-up care  Follow up with your healthcare provide, or as advised. Call if you are not improving within the next 1 to 2 weeks.  When to seek medical advice   Call your healthcare provider right away if any of these occur:    Increased pain or swelling    Hand or fingers become cold, blue, numb or tingly    Signs of infection: Warmth, drainage, or increased redness or pain around the injury    Inability to move the injured body part     Frequent bruising for unknown reasons  Date Last Reviewed: 2/1/2017 2000-2017 [employer]. [address] [place] [address], [place] [address]. All rights reserved. This information is not intended as a substitute for professional medical care. Always follow your healthcare professional's instructions.

## 2021-06-17 NOTE — PATIENT INSTRUCTIONS - HE
Patient Instructions by Julian Nevarez DO at 12/4/2019  5:00 PM     Author: Julian Nevarez DO Service: -- Author Type: Physician    Filed: 12/4/2019  5:32 PM Encounter Date: 12/4/2019 Status: Addendum    : Julian Nevarez DO (Physician)    Related Notes: Original Note by Julian Nevarez DO (Physician) filed at 12/4/2019  5:31 PM       See hand out for treatment advice. You can take your next dose of ibuprofen 6 hours after discharge. Caution that the hydrocodone may cause drowsiness and constipation, and can lead to psychological addiction in some people. There is also acetaminophen in this medication, so do not take other products containing this when using the prescription. Caution that the hydrocodone may cause drowsiness and constipation, and can lead to psychological addiction in some people. There is also acetaminophen in this medication, so do not take other products containing this when using the prescription.        Patient Education     Gout    Gout is an inflammation of a joint due to a build-up of gout crystals in the joint fluid. This occurs when there is an excess of uric acid (a normal waste product) in the body. Uric acid builds up in the body when the kidneys are unable to filter enough of it from the blood. This may occur with age. It is also associated with kidney disease. Gout occurs more often in people with obesity, diabetes, high blood pressure, or high levels of fats in the blood. It may run in families. Gout tends to come and go. A flare up of gout is called an attack. Drinking alcohol or eating certain foods (such as shellfish or foods with additives such as high-fructose corn syrup) may increase uric acid levels in the blood and cause a gout attack.  During a gout attack, the affected joint may become a hot, red, swollen and painful. If you have had one attack of gout, you are likely to have another. An attack of gout can be treated with medicine. If these attacks become  frequent, a daily medicine may be prescribed to help the kidneys remove uric acid from the body.  Home care  During a gout attack:    Rest painful joints. If gout affects the joints of your foot or leg, you may want to use crutches for the first few days to keep from bearing weight on the affected joint.    When sitting or lying down, raise the painful joint to a level higher than your heart.    Apply an ice pack (ice cubes in a plastic bag wrapped in a thin towel) over the injured area for 20 minutes every 1 to 2 hours the first day for pain relief. Continue this 3 to 4 times a day for swelling and pain.    Avoid alcohol and foods listed below (see Preventing attacks) during a gout attack. Drink extra fluid to help flush the uric acid through your kidneys.    If you were prescribed a medicine to treat gout, take it as your healthcare provider has instructed. Don't skip doses.    Take anti-inflammatory medicine as directed.     If pain medicines have been prescribed, take them exactly as directed.    Preventing attacks    Minimize or avoid alcohol use. Excess alcohol intake can cause a gout attack.    Limit these foods and beverages:  ? Organ meats, such as kidneys and liver  ? Certain seafoods (anchovies, sardines, shrimp, scallops, herring, mackerel)  ? Wild game, meat extracts and meat gravies  ? Foods and beverages sweetened with high-fructose corn syrup, such as sodas    Eat a healthy diet including low-fat and nonfat dairy, whole grains, and vegetables.    If you are overweight, talk to your healthcare provider about a weight reduction plan. Avoid fasting or extreme low calorie diets (less than 900 calories per day). This will increase uric acid levels in the body.    If you have diabetes or high blood pressure, work with your doctor to manage these conditions.    Protect the joint from injury. Trauma can trigger a gout attack.  Follow-up care  Follow up with your healthcare provider, or as advised.   When to  seek medical advice  Call your healthcare provider if you have any of the following:    Fever over 100.4 F (38. C) with worsening joint pain    Increasing redness around the joint    Pain developing in another joint    Repeated vomiting, abdominal pain, or blood in the vomit or stool (black or red color)  Date Last Reviewed: 3/1/2017    2335-1410 [employer]. [address] [place] [address]. All rights reserved. This information is not intended as a substitute for professional medical care. Always follow your healthcare professional's instructions.           Patient Education     Treating Gout Attacks     Raising the joint above the level of your heart can help reduce gout symptoms.     Gout is a disease that affects the joints. It is caused by excess uric acid in your blood that may lead to crystals forming in your joints. Left untreated, it can lead to painful foot and joint deformities and even kidney problems. But, by treating gout early, you can relieve pain and help prevent future problems. Gout can usually be treated with medicine and proper diet. In severe cases, surgery may be needed.  Gout attacks are painful and often happen more than once. Taking medicines may reduce pain and prevent attacks in the future. There are also some things you can do at home to relieve symptoms.  Medicines for gout  Your healthcare provider may prescribe a daily medicine to reduce levels of uric acid. Reducing your uric acid levels may help prevent gout attacks. Allopurinol is one commonly used medicine taken daily to reduce uric acid levels. Other daily medicines used to reduce uric acid levels include febuxostat, lesinurad, and probencid. Other medicines can help relieve pain and swelling during an acute attack. Medicines such as NSAIDs (nonsteroidal anti-inflammatory medicines), steroids, and colchicine may be prescribed for intermittent use to relieve an acute gout attack. Be sure to take your  medicine as directed.  What you can do  Below are some things you can do at home to relieve gout symptoms. Your healthcare provider may have other tips.    Rest the painful joint as much as you can.    Raise the painful joint so it is at a level higher than your heart.    Use ice for 10 minutes every 1 to 2 hours as possible.  How can I prevent gout?  With a little effort, you may be able to prevent gout attacks in the future. Here are some things you can do:    Don't eat foods high in purines  ? Certain meats (red meat, processed meat, turkey)  ? Organ meats (kidney, liver, sweetbread)  ? Shellfish (lobster, crab, shrimp, scallop, mussel)  ? Certain fish (anchovy, sardine, herring, mackerel)    Take any medicines prescribed by your healthcare provider.    Lose weight if you need to.    Reduce high fructose corn syrup in meals and drinks.    Reduce or cut out alcohol, particularly beer, but also red wine and spirits.    Control blood pressure, diabetes, and cholesterol.    Drink plenty of water to help flush uric acid from your body.  Date Last Reviewed: 4/1/2018 2000-2019 The [employer]. [address] [place] [address], [place] [address]. All rights reserved. This information is not intended as a substitute for professional medical care. Always follow your healthcare professional's instructions.

## 2021-06-17 NOTE — TELEPHONE ENCOUNTER
Last ov-3/15/21  Last labs- has not had labs drawn- lab appt on 5/18/21    Future appt-5/25/21    Please advise, ER notes in chart.

## 2021-06-17 NOTE — PATIENT INSTRUCTIONS - HE
Summary of Your Rheumatology Visit    Next Appointment: 10 weeks, preferable in clinic.  Please cancel follow-up visit for May 25.      Medications:    Please follow directives on pill bottle on how to take medication(s) provided.    Avoid Indocin and other anti-inflammatory medications such as ibuprofen or Aleve while on piroxicam.    Avoid anti-inflammatory medications including piroxicam while on prednisone.    Referrals:      Tests:       Patient has a lab appointment on May 18 recommend maintaining this lab appointment and rechecking labs again within 1 week prior to follow-up visit with us.    Recommend having labs performed at a [facility] facility, as results are then automatically uploaded into our in-basket system.  If having labs performed at a [facility] facility then patient should contact/notify us soon thereafter, so we can actively retrieve results from [email or web address] system.        Injections:      Other:

## 2021-06-17 NOTE — TELEPHONE ENCOUNTER
Please discuss verbally with the patient.    Prednisone is the best way to try to manage patient's acute pains/inflammation related to gouty flares.  Gout is an acute inflammatory reaction to crystal deposition.  Sometimes it may take a longer prednisone taper for significant flares. Therefore, if desires we can extend out prednisone taper to 40 mg for 1 week then 30 mg for 1 week then 20 mg for 1 week then 10 mg for 1 week then 5 mg for 1 week to off.  Take with food.    For breakthrough pain if desires, we can provide mild narcotic medication called tramadol 50 mg 1 to 2 tablets every 6-8 hours, only to be taken as needed.  Can prescribe in the following manner take 1-2 tab po q6-8hr prn for pain. 1 tab for pain levels of 4-6/10. 2 tabs for pain levels of 7-10/10. Can be sedating in some patients, recommend first trying on a day when not driving. 60 tabs, No refills.    Avoid alcoholic beverages, red meat, sugar sweetened drinks and shellfish.    Important to be compliant with allopurinol intake and not miss any dosing as sometimes just missing 1 or 2 doses can result in a flare.    If symptoms persist despite the above recommend being reassessed, if we do not have availability recommend seeing PCP or going to an urgent/emergent care facility.    Feel better!

## 2021-06-17 NOTE — PATIENT INSTRUCTIONS - HE
Patient Instructions by Fabian Mckeon MD at 9/25/2019  9:30 AM     Author: Fabian Mckeon MD Service: -- Author Type: Physician    Filed: 9/25/2019 10:05 AM Encounter Date: 9/25/2019 Status: Signed    : Fabian Mckeon MD (Physician)         Patient Education     Gout Diet  Gout is a painful condition caused by an excess of uric acid, a waste product made by the body. Uric acid forms crystals that collect in the joints. The immune response to these crystals brings on symptoms of joint pain and swelling. This is called a gout attack. Often, medications and diet changes are combined to manage gout. Below are some guidelines for changing your diet to help you manage gout and prevent attacks. Your health care provider will help you determine the best eating plan for you.     Eating to manage gout  Weight loss for those who are overweight may help reduce gout attacks.  Eat less of these foods  Eating too many foods containing purines may raise the levels of uric acid in your body. This raises your risk for a gout attack. Try to limit these foods and drinks:    Alcohol, such as beer and red wine. You may be told to avoid alcohol completely.    Soft drinks that contain sugar or high fructose corn syrup    Certain fish, including anchovies, sardines, fish eggs, and herring    Shellfish    Certain meats, such as red meat, hot dogs, luncheon meats, and turkey    Organ meats, such as liver, kidneys, and sweetbreads    Legumes, such as dried beans and peas    Other high fat foods such as gravy, whole milk, and high fat cheeses    Vegetables such as asparagus, cauliflower, spinach, and mushrooms used to be thought to contribute to an increased risk for a gout attack, but recent studies show that high purine vegetables don't increase the risk for a gout attack.  Eat more of these foods  Other foods may be helpful for people with gout. Add some of these foods to your diet:    Cherries contain chemicals that may  lower uric acid.    Omega fatty acids. These are found in some fatty fish such as salmon, certain oils (flax, olive, or nut), and nuts themselves. Omega fatty acids may help prevent inflammation due to gout.    Dairy products that are low-fat or fat-free, such as cheese and yogurt    Complex carbohydrate foods, including whole grains, brown rice, oats, and beans    Coffee, in moderation    Water, approximately 64 ounces per day  Follow-up care  Follow up with your healthcare provider as advised.  When to seek medical advice  Call your healthcare provider right away if any of these occur:    Return of gout symptoms, usually at night:    Severe pain, swelling, and heat in a joint, especially the base of the big toe    Affected joint is hard to move    Skin of the affected joint is purple or red    Fever of 100.4 F (38 C) or higher    Pain that doesn't get better even with prescribed medicine   Date Last Reviewed: 1/12/2016 2000-2017 [employer]. [address] [place] [address]. All rights reserved. This information is not intended as a substitute for professional medical care. Always follow your healthcare professional's instructions.           Patient Education     Treating Gout Attacks     Raising the joint above the level of your heart can help reduce gout symptoms.     Gout is a disease that affects the joints. It is caused by excess uric acid in your blood that may lead to crystals forming in your joints. Left untreated, it can lead to painful foot and joint deformities and even kidney problems. But, by treating gout early, you can relieve pain and help prevent future problems. Gout can usually be treated with medicine and proper diet. In severe cases, surgery may be needed.  Gout attacks are painful and often happen more than once. Taking medicines may reduce pain and prevent attacks in the future. There are also some things you can do at home to relieve symptoms.  Medicines for  gout  Your healthcare provider may prescribe a daily medicine to reduce levels of uric acid. Reducing your uric acid levels may help prevent gout attacks. Allopurinol is one commonly used medicine taken daily to reduce uric acid levels. Other daily medicines used to reduce uric acid levels include febuxostat, lesinurad, and probencid. Other medicines can help relieve pain and swelling during an acute attack. Medicines such as NSAIDs (nonsteroidal anti-inflammatory medicines), steroids, and colchicine may be prescribed for intermittent use to relieve an acute gout attack. Be sure to take your medicine as directed.  What you can do  Below are some things you can do at home to relieve gout symptoms. Your healthcare provider may have other tips.    Rest the painful joint as much as you can.    Raise the painful joint so it is at a level higher than your heart.    Use ice for 10 minutes every 1 to 2 hours as possible.  How can I prevent gout?  With a little effort, you may be able to prevent gout attacks in the future. Here are some things you can do:    Don't eat foods high in purines  ? Certain meats (red meat, processed meat, turkey)  ? Organ meats (kidney, liver, sweetbread)  ? Shellfish (lobster, crab, shrimp, scallop, mussel)  ? Certain fish (anchovy, sardine, herring, mackerel)    Take any medicines prescribed by your healthcare provider.    Lose weight if you need to.    Reduce high fructose corn syrup in meals and drinks.    Reduce or cut out alcohol, particularly beer, but also red wine and spirits.    Control blood pressure, diabetes, and cholesterol.    Drink plenty of water to help flush uric acid from your body.  Date Last Reviewed: 4/1/2018 2000-2019 [employer]. [address] [place] [address], [place] [address]. All rights reserved. This information is not intended as a substitute for professional medical care. Always follow your healthcare professional's instructions.

## 2021-06-17 NOTE — TELEPHONE ENCOUNTER
Patient has a lab appointment on May 18 recommended that maintaining this lab appointment and recheck labs again within 1 week prior to follow-up visit with us.  Hence, can you please reorder current labs as a standing order for every 8 to 12 weeks, thanks.

## 2021-06-17 NOTE — PATIENT INSTRUCTIONS - HE
Patient Instructions by Breonna Choudhury MD at 7/29/2019  5:30 PM     Author: Breonan Choudhury MD Service: -- Author Type: Physician    Filed: 7/29/2019  7:19 PM Encounter Date: 7/29/2019 Status: Addendum    : Breonna Choudhury MD (Physician)    Related Notes: Original Note by Breonna Choudhury MD (Physician) filed at 7/29/2019  7:19 PM       1. Keep appointment with Rheumatologist next week  2. If symptoms are getting worse over time or you have any questions, call the clinic number - it's answered 24/7      Patient Education     Gout Diet  Gout is a painful condition caused by an excess of uric acid, a waste product made by the body. Uric acid forms crystals that collect in the joints. The immune response to these crystals brings on symptoms of joint pain and swelling. This is called a gout attack. Often, medications and diet changes are combined to manage gout. Below are some guidelines for changing your diet to help you manage gout and prevent attacks. Your health care provider will help you determine the best eating plan for you.     Eating to manage gout  Weight loss for those who are overweight may help reduce gout attacks.  Eat less of these foods  Eating too many foods containing purines may raise the levels of uric acid in your body. This raises your risk for a gout attack. Try to limit these foods and drinks:    Alcohol, such as beer and red wine. You may be told to avoid alcohol completely.    Soft drinks that contain sugar or high fructose corn syrup    Certain fish, including anchovies, sardines, fish eggs, and herring    Shellfish    Certain meats, such as red meat, hot dogs, luncheon meats, and turkey    Organ meats, such as liver, kidneys, and sweetbreads    Legumes, such as dried beans and peas    Other high fat foods such as gravy, whole milk, and high fat cheeses    Vegetables such as asparagus, cauliflower, spinach, and mushrooms used to be thought to contribute to an increased risk for  a gout attack, but recent studies show that high purine vegetables don't increase the risk for a gout attack.  Eat more of these foods  Other foods may be helpful for people with gout. Add some of these foods to your diet:    Cherries contain chemicals that may lower uric acid.    Omega fatty acids. These are found in some fatty fish such as salmon, certain oils (flax, olive, or nut), and nuts themselves. Omega fatty acids may help prevent inflammation due to gout.    Dairy products that are low-fat or fat-free, such as cheese and yogurt    Complex carbohydrate foods, including whole grains, brown rice, oats, and beans    Coffee, in moderation    Water, approximately 64 ounces per day  Follow-up care  Follow up with your healthcare provider as advised.  When to seek medical advice  Call your healthcare provider right away if any of these occur:    Return of gout symptoms, usually at night:    Severe pain, swelling, and heat in a joint, especially the base of the big toe    Affected joint is hard to move    Skin of the affected joint is purple or red    Fever of 100.4 F (38 C) or higher    Pain that doesn't get better even with prescribed medicine   Date Last Reviewed: 1/12/2016 2000-2017 [employer]. [address] [place] [address]. All rights reserved. This information is not intended as a substitute for professional medical care. Always follow your healthcare professional's instructions.           Patient Education     Treating Gout Attacks     Raising the joint above the level of your heart can help reduce gout symptoms.     Gout is a disease that affects the joints. It is caused by excess uric acid in your blood stream that may lead to crystals forming in your joints. Left untreated, it can lead to painful foot and joint deformities and even kidney problems. But, by treating gout early, you can relieve pain and help prevent future problems. Gout can usually be treated with medication  and proper diet. In severe cases, surgery may be needed.  Gout attacks are painful and often happen more than once. Taking medications may reduce pain and prevent attacks in the future. There are also some things you can do at home to relieve symptoms.  Medications for gout  Your healthcare provider may prescribe a daily medication to reduce levels of uric acid. Reducing your uric acid levels may help prevent gout attacks. Allopurinol is one commonly used medication taken daily to reduce uric acid levels. Other medications can help relieve pain and swelling during an acute attack. Medicines such as NSAIDs (nonsteroidal anti-inflammatory medicines), steroids, and colchicine may be prescribed for intermittent use to relieve an acute gout attack. Be sure to take your medication as directed.  What you can do  Below are some things you can do at home to relieve gout symptoms. Your healthcare provider may have other tips.    Rest the painful joint as much as you can.    Raise the painful joint so it is at a level higher than your heart.    Use ice for 10 minutes every 1-2 hours as possible.  How can I prevent gout?  With a little effort, you may be able to prevent gout attacks in the future. Here are some things you can do:    Avoid foods high in purines  ? Certain meats (red meat, processed meat, turkey)  ? Organ meats (kidney, liver, sweetbread)  ? Shellfish (lobster, crab, shrimp, scallop, mussel)  ? Certain fish (anchovy, sardine, herring, mackerel)    Take any medications prescribed by your healthcare provider.    Lose weight if you need to.    Reduce high fructose corn syrup in meals and drinks.    Reduce or eliminate consumption of alcohol, particularly beer, but also red wine and spirits.    Control blood pressure, diabetes, and cholesterol.    Drink plenty of water to help flush uric acid from your body.  Date Last Reviewed: 2/1/2016 2000-2017 [employer]. [address] [facility], [place]  [address]. All rights reserved. This information is not intended as a substitute for professional medical care. Always follow your healthcare professional's instructions.

## 2021-06-17 NOTE — TELEPHONE ENCOUNTER
LMTCB x2 (I let patient know in message that I am cancelling his May 25th follow up as he was just seen May 6th)

## 2021-06-17 NOTE — PATIENT INSTRUCTIONS - HE
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    Do not use over-the-counter ibuprofen Advil Motrin or Aleve.  The Toradol injection will last for 24 hours.    Of Tylenol over-the-counter.  The Tylenol would not interact with the prednisone.    The prednisone as written.  Ibuprofen type products over-the-counter will interact with prednisone so do not take them.    Follow-up with your rheumatologist and your primary care provider for long-term control of your tophaceous gout.        Patient Education     Treating Gout Attacks     Raising the joint above the level of your heart can help reduce gout symptoms.     Gout is a disease that affects the joints. It is caused by excess uric acid in your blood that may lead to crystals forming in your joints. Left untreated, it can lead to painful foot and joint deformities and even kidney problems. But, by treating gout early, you can relieve pain and help prevent future problems. Gout can usually be treated with medicine and proper diet. In severe cases, surgery may be needed.  Gout attacks are painful and often happen more than once. Taking medicines may reduce pain and prevent attacks in the future. There are also some things you can do at home to relieve symptoms.  Medicines for gout  Your healthcare provider may prescribe a daily medicine to reduce levels of uric acid. Reducing your uric acid levels may help prevent gout attacks. Allopurinol is one commonly used medicine taken daily to reduce uric acid levels. Other daily medicines used to reduce uric acid  levels include febuxostat, lesinurad, and probencid. Other medicines can help relieve pain and swelling during an acute attack. Medicines such as NSAIDs (nonsteroidal anti-inflammatory medicines), steroids, and colchicine may be prescribed for intermittent use to relieve an acute gout attack. Be sure to take your medicine as directed.  What you can do  Below are some things you can do at home to relieve gout symptoms. Your healthcare provider may have other tips.    Rest the painful joint as much as you can.    Raise the painful joint so it is at a level higher than your heart.    Use ice for 10 minutes every 1 to 2 hours as possible.  How can I prevent gout?  With a little effort, you may be able to prevent gout attacks in the future. Here are some things you can do:    Don't eat foods high in purines  ? Certain meats (red meat, processed meat, turkey)  ? Organ meats (kidney, liver, sweetbread)  ? Shellfish (lobster, crab, shrimp, scallop, mussel)  ? Certain fish (anchovy, sardine, herring, mackerel)    Take any medicines prescribed by your healthcare provider.    Lose weight if you need to.    Reduce high fructose corn syrup in meals and drinks.    Reduce or cut out alcohol, particularly beer, but also red wine and spirits.    Control blood pressure, diabetes, and cholesterol.    Drink plenty of water to help flush uric acid from your body.  Date Last Reviewed: 4/1/2018 2000-2019 [employer]. [address] [place] [address]. All rights reserved. This information is not intended as a substitute for professional medical care. Always follow your healthcare professional's instructions.           Patient Education     Gout Diet  Gout is a painful condition caused by an excess of uric acid, a waste product made by the body. Uric acid forms crystals that collect in the joints. The immune response to these crystals brings on symptoms of joint pain and swelling. This is called a gout attack.  Often, medications and diet changes are combined to manage gout. Below are some guidelines for changing your diet to help you manage gout and prevent attacks. Your health care provider will help you determine the best eating plan for you.     Eating to manage gout  Weight loss for those who are overweight may help reduce gout attacks.  Eat less of these foods  Eating too many foods containing purines may raise the levels of uric acid in your body. This raises your risk for a gout attack. Try to limit these foods and drinks:    Alcohol, such as beer and red wine. You may be told to avoid alcohol completely.    Soft drinks that contain sugar or high fructose corn syrup    Certain fish, including anchovies, sardines, fish eggs, and herring    Shellfish    Certain meats, such as red meat, hot dogs, luncheon meats, and turkey    Organ meats, such as liver, kidneys, and sweetbreads    Legumes, such as dried beans and peas    Other high fat foods such as gravy, whole milk, and high fat cheeses    Vegetables such as asparagus, cauliflower, spinach, and mushrooms used to be thought to contribute to an increased risk for a gout attack, but recent studies show that high purine vegetables don't increase the risk for a gout attack.  Eat more of these foods  Other foods may be helpful for people with gout. Add some of these foods to your diet:    Cherries contain chemicals that may lower uric acid.    Omega fatty acids. These are found in some fatty fish such as salmon, certain oils (flax, olive, or nut), and nuts themselves. Omega fatty acids may help prevent inflammation due to gout.    Dairy products that are low-fat or fat-free, such as cheese and yogurt    Complex carbohydrate foods, including whole grains, brown rice, oats, and beans    Coffee, in moderation    Water, approximately 64 ounces per day  Follow-up care  Follow up with your healthcare provider as advised.  When to seek medical advice  Call your healthcare  provider right away if any of these occur:    Return of gout symptoms, usually at night:    Severe pain, swelling, and heat in a joint, especially the base of the big toe    Affected joint is hard to move    Skin of the affected joint is purple or red    Fever of 100.4 F (38 C) or higher    Pain that doesn't get better even with prescribed medicine   Date Last Reviewed: 1/12/2016 2000-2017 [employer]. [address] [place] [address], [place] [address]. All rights reserved. This information is not intended as a substitute for professional medical care. Always follow your healthcare professional's instructions.           Patient Education     Eating to Prevent Gout  Gout is a painful form of arthritis caused by an excess of uric acid. This is a waste product made by the body. It builds up in the body and forms crystals that collect in the joints, causing a gout attack. Alcohol and certain foods can trigger a gout attack. Below are some guidelines for changing your diet to help you manage gout. Your healthcare provider can work with you to determine the best eating plan for you. Know that diet is only one part of managing gout. Take your medicines as prescribed and follow the other guidelines your healthcare provider has given you.  Foods to limit  Eating too many foods containing purines may increase the levels of uric acid in your body and increase your risk for a gout attack. It may be best to limit these high-purine foods:    Alcohol (beer and red wine). You may be told to avoid alcohol completely.    Certain fish (anchovies, sardines, fish roes, herring, tuna, mussels, codfish, scallops, trout, and katarzyna)    Certain meats (red meat, processed meat, noble, turkey, wild game, and goose)    Sauces and gravies made with meat    Organ meats (such as liver, kidneys, sweetbreads, and tripe)    Legumes (such as dried beans and peas)    Mushrooms, spinach, asparagus, and cauliflower    Yeast and yeast extract  supplements  Foods to try  Some foods may be helpful for people with gout. You may want to try adding some of the following foods to your diet:    Dark berries. These include blueberries, blackberries, and cherries. These berries contain chemicals that may lower uric acid.    Tofu. Tofu, which is made from soy, is a good source of protein. Studies have shown that it may be a better choice than meat for people with gout.    Omega fatty acids. These acids are found in fatty fish (such as salmon), certain oils (such as flax, olive, or nut oils), or nuts. They may help prevent inflammation due to gout.  The following guidelines are recommended by the American Medical Association for people with gout. Your diet should be:    High in fiber, whole grains, fruits, and vegetables.    Low in protein (15% of calories should come from protein. Choose lean sources, such as soy, lean meats, and poultry).    Low in fat (no more than 30% of calories should come from fat, with only 10% coming from animal, or saturated, fat).  Date Last Reviewed: 11/1/2017 2000-2019 [employer]. [address] [place] [address]. All rights reserved. This information is not intended as a substitute for professional medical care. Always follow your healthcare professional's instructions.           Patient Education     What Is Gout?  Gout is a disease that affects the joints. Left untreated, it can lead to painful foot and joint deformities and even kidney problems. But, by treating gout early, you can relieve pain and help prevent future problems. Gout can usually be treated with medicine and proper diet. In severe cases, surgery may be needed.  What causes gout?  Gout is caused by an excess of uric acid (a waste product made by the body). Uric acid is excreted by the kidneys. If the uric acid level in your blood rises too high, the uric acid may form crystals that collect in the joints, bringing on a gout attack. If you have  "many gout attacks, crystals may form large deposits called tophi. Tophi can damage joints and cause deformity.  Who is at risk for gout?  Men are more likely to have gout than women. But women can also be affected, mostly after menopause. Some health problems, such as obesity and high cholesterol, make gout more likely. And some medicines, such as diuretics (water pills), can trigger a gout attack. People who drink a lot of alcohol are at high risk for gout. Certain foods can also trigger a gout attack.  Substances that may trigger a gout attack  To help prevent a gout attack, avoid these foods:    Alcohol (particularly beer, but also red wine and spirits)    Certain meats (red meat, processed meat, turkey)    Organ meats (kidney, liver, sweetbread)    Shellfish (lobster, crab, shrimp, scallop, mussel)    Certain fish (anchovy, sardine, herring, mackerel)   Treatment    Lifestyle changes, including weight loss, exercise, and quitting tobacco use    Reducing consumption of the food groups above as well as high fructose corn syrup, found in many foods including sodas and energy drinks    Changing non-essential medicines that may contribute to gout (such as thiazide diuretics--\"water pills\")    Medicines to reduce the amount of uric acid in the blood, such as allopurinol, probencid, febuxostat, and lesinurad.    Medicines to treat acute gout attacks, including NSAIDs (nonsteroidal anti-inflammatory medicines), steroids, and colchicine    Date Last Reviewed: 4/1/2018 2000-2019 [employer]. [address] [place] [address], [place] [address]. All rights reserved. This information is not intended as a substitute for professional medical care. Always follow your healthcare professional's instructions.                "

## 2021-06-17 NOTE — PATIENT INSTRUCTIONS - HE
Patient Instructions by Fabian Mckeon MD at 3/19/2019  4:10 PM     Author: Fabian Mckeon MD Service: -- Author Type: Physician    Filed: 3/19/2019  4:24 PM Encounter Date: 3/19/2019 Status: Addendum    : Fabian Mckeon MD (Physician)    Related Notes: Original Note by Fabian Mckeon MD (Physician) filed at 3/19/2019  4:24 PM       Follow up with primary care provider and Rheumatology.      Patient Education     Treating Gout Attacks     Raising the joint above the level of your heart can help reduce gout symptoms.     Gout is a disease that affects the joints. It is caused by excess uric acid in your blood stream that may lead to crystals forming in your joints. Left untreated, it can lead to painful foot and joint deformities and even kidney problems. But, by treating gout early, you can relieve pain and help prevent future problems. Gout can usually be treated with medication and proper diet. In severe cases, surgery may be needed.  Gout attacks are painful and often happen more than once. Taking medications may reduce pain and prevent attacks in the future. There are also some things you can do at home to relieve symptoms.  Medications for gout  Your healthcare provider may prescribe a daily medication to reduce levels of uric acid. Reducing your uric acid levels may help prevent gout attacks. Allopurinol is one commonly used medication taken daily to reduce uric acid levels. Other medications can help relieve pain and swelling during an acute attack. Medicines such as NSAIDs (nonsteroidal anti-inflammatory medicines), steroids, and colchicine may be prescribed for intermittent use to relieve an acute gout attack. Be sure to take your medication as directed.  What you can do  Below are some things you can do at home to relieve gout symptoms. Your healthcare provider may have other tips.    Rest the painful joint as much as you can.    Raise the painful joint so it is at a level higher than  your heart.    Use ice for 10 minutes every 1-2 hours as possible.  How can I prevent gout?  With a little effort, you may be able to prevent gout attacks in the future. Here are some things you can do:    Avoid foods high in purines  ? Certain meats (red meat, processed meat, turkey)  ? Organ meats (kidney, liver, sweetbread)  ? Shellfish (lobster, crab, shrimp, scallop, mussel)  ? Certain fish (anchovy, sardine, herring, mackerel)    Take any medications prescribed by your healthcare provider.    Lose weight if you need to.    Reduce high fructose corn syrup in meals and drinks.    Reduce or eliminate consumption of alcohol, particularly beer, but also red wine and spirits.    Control blood pressure, diabetes, and cholesterol.    Drink plenty of water to help flush uric acid from your body.  Date Last Reviewed: 2/1/2016 2000-2017 [employer]. [address] [place] [address], [place] [address]. All rights reserved. This information is not intended as a substitute for professional medical care. Always follow your healthcare professional's instructions.

## 2021-06-17 NOTE — TELEPHONE ENCOUNTER
Can try prednisone taper in the following manner:    Prednisone 10 mg tablets: 1 refill  If experiencing gout flare then start with 4 tabs p.o. daily then decrease by 1 tab every 3 days to off.  Take with food.      Feel better!

## 2021-06-17 NOTE — PROGRESS NOTES
Katya Purdy who presents today with a chief complaint of  No chief complaint on file.      Joint Pains: yes  Location: rt wrist and rt hand  Onset: 3-4 weeks  Intensity:  8/10  AM Stiffness: n/a  Alleviating/Aggravating Factors: nothing  Tolerating Meds: yes  Other:      ROS:  Patient denies having any chest pain, shortness of breath, cough, abdominal pain, nausea, vomiting, rashes, fevers, oral ulcers and recent infections.  Patient admits to having a good appetite      Problem List:  Patient Active Problem List   Diagnosis     Gout     Tobacco use disorder     Acute monoarthritis     Healthcare maintenance     Tophaceous gout     Acute gouty arthritis     S/P arthrocentesis     Hemarthrosis     SIRS (systemic inflammatory response syndrome) (H)        PMH:   Past Medical History:   Diagnosis Date     Arthritis     gout     Gout      PDA (patent ductus arteriosus)        Surgical History:  No past surgical history on file.    Family History:  No family history on file.    Social History:   reports that he quit smoking about 2 years ago. His smoking use included cigarettes. He has never used smokeless tobacco. He reports previous alcohol use. He reports that he does not use drugs.    Allergies:  No Known Allergies     Current Medications:  Current Outpatient Medications   Medication Sig Dispense Refill     allopurinoL (ZYLOPRIM) 100 MG tablet Take 1 tablet daily, together with one 300 mg tablet, for total of 400 mg daily. 90 tablet 0     allopurinoL (ZYLOPRIM) 300 MG tablet Take 1 tablet daily, together with one 100 mg tablet, for total of 400 mg daily. 90 tablet 0     HYDROcodone-acetaminophen 5-325 mg per tablet Take 1 tablet by mouth every 8 (eight) hours as needed for pain. 12 tablet 0     omeprazole (PRILOSEC) 20 MG capsule Take 1 capsule (20 mg total) by mouth daily before breakfast. 30 capsule 0     predniSONE (DELTASONE) 10 mg tablet If experiencing gout flare then start with 4 tabs p.o. daily then  decrease by 1 tab every 3 days to off. Take with food. 30 tablet 1     traMADoL (ULTRAM) 50 mg tablet Take 1 to 2 tablets every 6-8 hours, only to be taken as needed. Take 1 tab for pain levels of 4-6/10. Take 2 tabs for pain levels of 7-10/10. Can be sedating in some patients, recommend first trying on a day when not driving. 60 tablet 0     No current facility-administered medications for this visit.            Physical Exam:  Following up today via video visit, per Covid-19 pandemic requirements.    Verbal consent has been obtained for this service by care team member.    Video call start time: 11:58 PM    Video call end time: 12:30 PM    [employer] utilized for video call.    Phone number utilized: [[phone]]    Patient location for video visit: Home     Provider location for video visit:  Home (working remotely)      Summary/Assessment:    History that includes tophaceous gout.    Presents for follow-up visit.    States since last visit has been experiencing active arthritis with fluctuating activity, requiring recurrent brief prednisone tapers.    Currently mainly has arthritic flare involving right wrist and right hand (MCP/PIPs).  Is at tail end of prednisone taper prescribed in the following manner 40 mg for 4 days, 30 mg for 3 days, 20 mg for 2 days, 10 mg for 3 days.  Has 1 day left of 10 mg.  Current prednisone taper provided some relief however still has residual pain with swelling.    On video exam noted to have some subtle fullness involving right wrist, right MCPs and PIPs, tophaceous lesion noted involving right third MCP joint.    States this time has been compliant with taking allopurinol 400 mg daily, since last visit on March 15, 2021.    Has also been careful with diet, claims as in the past to have been avoiding alcoholic beverages, red meat and shellfish.    Tramadol recently prescribed takes some of the edge off hence takes at night, finds too sedating to take during the day.    Currently  not taking any NSAID.    Stated colchicine too costly.    Patient did not pursue lab orders, states has an appointment on May 18, 2021.    Please see below for management plan.      From prior note: Presented on initial visit with pertinent past medical history of gout presents today with left hand pain.    Patient explains that he has been experiencing gouty flares for the past 9 years.    On average has about 5 flares per year, sometimes once a month.  Often go to urgent/emergent care facility and treated with Toradol followed by prednisone.    Initially was not drinking alcoholic beverages, has been off of alcoholic beverages for several years now.    Denies red meat or shellfish intake.    Positive family history for gout on both sides (uncles and grandfather).    Initially big toe involved however with time has progressed to include ankles, knees, elbows, wrists and hands.    Came down with a gouty flare about 1 to 2 weeks ago, placed on prednisone 40 mg x 5 days, just took last dose.    Still has some residual discomfort and swelling involving left second MCP, noted on video exam today.  Also noted to tophaceous nodule near right third MCP joint.  Denies having other tophaceous nodules.    States when flaring not necessarily in symmetrical manner.    With regards to allopurinol, believes started about a year and a half ago.  When flaring patient would hold his allopurinol and restart when overcoming flares.    Ibuprofen ineffective.  Indomethacin ineffective.  However as mentioned would hold allopurinol when flaring/trying these meds.    Patient last took allopurinol about 2 months ago.    Last uric acid level from January 20, 2021 (about 2 months ago) was 12.4, back in November 2020 had a level of 7.1.  Patient believes he was on allopurinol then.    States colchicine prescribed in the past however it was too expensive for him.  Patient unsure if this was a prescription for Colcrys or colchicine.    Noted to  have negative/unremarkable: Rheumatoid factor, CCP antibody    Above presentation can be consistent with having acute on chronic gout.        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    Other:    Marital status:  Single     How many kids:  no    Type of work:  No, worked in [employer].     Drinking alcohol: no    Tobacco use: no     Recreational drug use: no     From the past note: Had a lengthy discussion with the patient regarding importance to be compliant with allopurinol including when flaring, should not discontinue and restart, as performed in the past.  Patient should remain on allopurinol through flares. For now, should only hold allopurinol if develops adverse reaction to medication.       Plan:      For active inflammatory arthritis will provide lengthier prednisone taper starting at 40 mg, decreasing by 10 mg q. 5 days to off.    We will increase allopurinol from 400 mg daily to 600 mg daily (300 mg twice daily).  Patient already made aware regarding importance of being strictly compliant in taking this medication daily.    After completing prednisone taper he is to take piroxicam 20 mg daily.  Made aware to avoid other NSAIDs.  Made aware as well to hold piroxicam if on prednisone again.    Continue avoiding alcoholic beverages, red meat and shellfish.    Recommend having labs checked  in 2 weeks and prior to next visit.    Follow-up in 10 weeks.      Procedure note:     Total time spent 50 minutes involved with patient care, includes placing orders, reviewing records and and formulating management plan.    This note was transcribed using Dragon voice recognition software as a result unintentional grammatical errors or word substitutions may have occurred. Please contact our Rheumatology department if you need any clarification or if you have any related inquiries.    Major side effect profile of medications provided were discussed with the patient.      Yousif Sapp DO  ....................  5/6/2021   11:15 AM

## 2021-06-18 NOTE — PATIENT INSTRUCTIONS - HE
Patient Instructions by Juancarlos Magana PA-C at 1/20/2021  8:20 AM     Author: Juancarlos Magana PA-C Service: -- Author Type: Physician Assistant    Filed: 1/20/2021  8:58 AM Encounter Date: 1/20/2021 Status: Signed    : Juancarlos Magana PA-C (Physician Assistant)         Patient Education     Eating to Prevent Gout  Gout is a painful form of arthritis caused by an excess of uric acid. This is a waste product made by the body. It builds up in the body and forms crystals that collect in the joints, causing a gout attack. Alcohol and certain foods can trigger a gout attack. Below are some guidelines for changing your diet to help you manage gout. Your healthcare provider can work with you to determine the best eating plan for you. Know that diet is only one part of managing gout. Take your medicines as prescribed and follow the other guidelines your healthcare provider has given you.  Foods to limit  Eating too many foods containing purines may increase the levels of uric acid in your body and increase your risk for a gout attack. It may be best to limit these high-purine foods:    Alcohol (beer and red wine). You may be told to avoid alcohol completely.    Certain fish (anchovies, sardines, fish roes, herring, tuna, mussels, codfish, scallops, trout, and katarzyna)    Certain meats (red meat, processed meat, noble, turkey, wild game, and goose)    Sauces and gravies made with meat    Organ meats (such as liver, kidneys, sweetbreads, and tripe)    Legumes (such as dried beans and peas)    Mushrooms, spinach, asparagus, and cauliflower    Yeast and yeast extract supplements  Foods to try  Some foods may be helpful for people with gout. You may want to try adding some of the following foods to your diet:    Dark berries. These include blueberries, blackberries, and cherries. These berries contain chemicals that may lower uric acid.    Tofu. Tofu, which is made from soy, is a good source of  protein. Studies have shown that it may be a better choice than meat for people with gout.    Omega fatty acids. These acids are found in fatty fish (such as salmon), certain oils (such as flax, olive, or nut oils), or nuts. They may help prevent inflammation due to gout.  The following guidelines are recommended by the American Medical Association for people with gout. Your diet should be:    High in fiber, whole grains, fruits, and vegetables.    Low in protein (15% of calories should come from protein. Choose lean sources, such as soy, lean meats, and poultry).    Low in fat (no more than 30% of calories should come from fat, with only 10% coming from animal, or saturated, fat).  Date Last Reviewed: 11/1/2017 2000-2019 [employer]. [address] [place] [address], [place] [address]. All rights reserved. This information is not intended as a substitute for professional medical care. Always follow your healthcare professional's instructions.

## 2021-06-18 NOTE — PROGRESS NOTES
Assessment/ Plan  Problem List Items Addressed This Visit        Unprioritized    Gout - Primary     Most likely diagnosis given pre-existing condition and acute arthritis MCP joint #1 and #2  Plan: Colchicine  Follow-up: 1 month to consider allopurinol  Comment: Handout given on dietary measures             Tobacco use disorder     Daily social smoking.  Pre-contemplative  Motivational interviewing, discussed risk of smoking, likely negron of addiction             Subjective  CC:  Chief Complaint   Patient presents with     Swelling of the wrist     R. wrist - started last week      HPI:  Right wrist pain    Duration/ timing of onset: 1 week of pain on mcp jt and and 2nd mcp  Swelling but no pain  Now wrist pan  Only on back side  Location of pain dorsum    Woke up with pain    Severity/ Quality: sharp  Modifying factors: Make it worse- wrist   Make it better-naproxen has not helped much  Swelling? Not now  History of wrist problems/injury or previous work-up/ treatment?  No but has had gout before and this feels like it    PFSH:  Patient Active Problem List   Diagnosis     Gout     Tobacco use disorder     Current medications reviewed as follows:  Current Outpatient Prescriptions on File Prior to Visit   Medication Sig     naproxen (NAPROSYN) 500 MG tablet Take 1 tablet (500 mg total) by mouth 2 (two) times a day as needed.     [DISCONTINUED] allopurinol (ZYLOPRIM) 300 MG tablet Take 1 tablet (300 mg total) by mouth daily.     [DISCONTINUED] cyclobenzaprine (FLEXERIL) 5 MG tablet Take 1-2 tablets (5-10 mg total) by mouth 3 (three) times a day as needed for muscle spasms.     [DISCONTINUED] ibuprofen (ADVIL,MOTRIN) 200 MG tablet Take 200 mg by mouth every 6 (six) hours as needed for pain.     No current facility-administered medications on file prior to visit.      History   Smoking Status     Current Every Day Smoker     Types: Cigarettes   Smokeless Tobacco     Never Used     Comment: 5 cigarettes of [place]  per day     Social History     Social History Narrative     Patient Care Team:  MIREYA Smith as PCP - General  ROS  Full 10 system review including constitutional, respiratory, cardiac, gi, urinary, rheumatologic, neurologic, reproductive, dermatologic psychiatric is  performed  and is otherwise negative         Objective  Physical Exam  Vitals:    06/19/18 1119   BP: 144/82   Patient Site: Left Arm   Patient Position: Sitting   Cuff Size: Adult Regular   Pulse: 64   Resp: 16   Temp: 98  F (36.7  C)   TempSrc: Oral   Weight: 198 lb 8 oz (90 kg)     Body mass index is 32.04 kg/(m^2).  Right hand is examined.  There is warmth and tenderness about the first MTP and second MP joints upon palpation.  Wrist exam is basically normal.  Finkelstein's test negative.  No sign of trauma or other abnormality  Diagnostics:   None      Please note: Voice recognition software was used in this dictation.  It may therefore contain typographical errors.

## 2021-06-18 NOTE — PATIENT INSTRUCTIONS - HE
Patient Instructions by Juancarlos Magana PA-C at 12/24/2020 11:20 AM     Author: Juancarlos Magana PA-C Service: -- Author Type: Physician Assistant    Filed: 12/24/2020 11:42 AM Encounter Date: 12/24/2020 Status: Signed    : Juancarlos Magana PA-C (Physician Assistant)         Patient Education     Eating to Prevent Gout  Gout is a painful form of arthritis caused by an excess of uric acid. This is a waste product made by the body. It builds up in the body and forms crystals that collect in the joints, causing a gout attack. Alcohol and certain foods can trigger a gout attack. Below are some guidelines for changing your diet to help you manage gout. Your healthcare provider can work with you to determine the best eating plan for you. Know that diet is only one part of managing gout. Take your medicines as prescribed and follow the other guidelines your healthcare provider has given you.  Foods to limit  Eating too many foods containing purines may increase the levels of uric acid in your body and increase your risk for a gout attack. It may be best to limit these high-purine foods:    Alcohol (beer and red wine). You may be told to avoid alcohol completely.    Certain fish (anchovies, sardines, fish roes, herring, tuna, mussels, codfish, scallops, trout, and katarzyna)    Certain meats (red meat, processed meat, noble, turkey, wild game, and goose)    Sauces and gravies made with meat    Organ meats (such as liver, kidneys, sweetbreads, and tripe)    Legumes (such as dried beans and peas)    Mushrooms, spinach, asparagus, and cauliflower    Yeast and yeast extract supplements  Foods to try  Some foods may be helpful for people with gout. You may want to try adding some of the following foods to your diet:    Dark berries. These include blueberries, blackberries, and cherries. These berries contain chemicals that may lower uric acid.    Tofu. Tofu, which is made from soy, is a good source of  protein. Studies have shown that it may be a better choice than meat for people with gout.    Omega fatty acids. These acids are found in fatty fish (such as salmon), certain oils (such as flax, olive, or nut oils), or nuts. They may help prevent inflammation due to gout.  The following guidelines are recommended by the American Medical Association for people with gout. Your diet should be:    High in fiber, whole grains, fruits, and vegetables.    Low in protein (15% of calories should come from protein. Choose lean sources, such as soy, lean meats, and poultry).    Low in fat (no more than 30% of calories should come from fat, with only 10% coming from animal, or saturated, fat).  Date Last Reviewed: 11/1/2017 2000-2019 [employer]. [address] [place] [address], [place] [address]. All rights reserved. This information is not intended as a substitute for professional medical care. Always follow your healthcare professional's instructions.

## 2021-06-18 NOTE — PROGRESS NOTES
Assessment/ Plan  Problem List Items Addressed This Visit        Unprioritized    Acute monoarthritis - Primary     Inflammatory, MCP joint right thumb  Responding well to colchicine, patient with history of presumed gout  Afebrile, white blood cell count normal, CRP pending.  X-ray ordered but not done today.  He is coming back tomorrow morning for this.  Will make an addendum  Plan: Switch to prednisone thumb spica splint  Follow-up: With x-ray results.  Probably rest with the splint, prednisone and follow-up with rheumatology if not improving               Relevant Orders    HM2(CBC w/o Differential) (Completed)    C-Reactive Protein    XR Hand Right 3 or More VWS        Subjective  CC:  Chief Complaint   Patient presents with     Follow-up     Pt states they need medicine for hand      HPI:  23-year-old recently seen for thumb pain on the right hand which is felt to be gout presents for follow-up on same pain since medication not working.  Was prescribed colchicine, typical gout dosing.  Taken 2 days medication and has not had any improvement.    Has significant pain when he moves his thumb but some throbbing pain even without movement.  Denies fever or chills.  No history of thumb injury.  See note from 6/1  PFSH:  Patient Active Problem List   Diagnosis     Gout     Tobacco use disorder     Acute monoarthritis     Current medications reviewed as follows:  Current Outpatient Prescriptions on File Prior to Visit   Medication Sig     colchicine 0.6 mg tablet 2 tabs  followed by 1 tab one hour later prn gout flare     naproxen (NAPROSYN) 500 MG tablet Take 1 tablet (500 mg total) by mouth 2 (two) times a day as needed.     No current facility-administered medications on file prior to visit.      History   Smoking Status     Current Every Day Smoker     Types: Cigarettes   Smokeless Tobacco     Never Used     Comment: 5 cigarettes of [place] per day     Social History     Social History Narrative     Patient  Care Team:  MIREYA Smith as PCP - General  ROS  Full 10 system review including constitutional, respiratory, cardiac, gi, urinary, rheumatologic, neurologic, reproductive, dermatologic psychiatric is  performed  and is otherwise negative         Objective  Physical Exam  Vitals:    06/21/18 1620   BP: 114/80   Patient Site: Right Arm   Patient Position: Sitting   Cuff Size: Adult Large   Pulse: 88   Resp: 20   Temp: 98.2  F (36.8  C)   TempSrc: Oral   Weight: 200 lb 8 oz (90.9 kg)     Body mass index is 32.36 kg/(m^2).  Patient with swelling pain and positive grind test to right MCP joint.  Under of wrist and hand joint is normal.  There is no redness, just a little warmth, no axillary adenopathy  Diagnostics:   Results for orders placed or performed in visit on 06/21/18   HM2(CBC w/o Differential)   Result Value Ref Range    WBC 7.8 4.0 - 11.0 thou/uL    RBC 5.28 4.40 - 6.20 mill/uL    Hemoglobin 15.2 14.0 - 18.0 g/dL    Hematocrit 46.3 40.0 - 54.0 %    MCV 88 80 - 100 fL    MCH 28.9 27.0 - 34.0 pg    MCHC 32.9 32.0 - 36.0 g/dL    RDW 12.6 11.0 - 14.5 %    Platelets 255 140 - 440 thou/uL    MPV 7.2 7.0 - 10.0 fL           Please note: Voice recognition software was used in this dictation.  It may therefore contain typographical errors.

## 2021-06-18 NOTE — PATIENT INSTRUCTIONS - HE
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  In the long run this may destroy tendons and joints and bone.  I highly encourage you to follow-up with rheumatology to look for a long-term solution.    In the interim, you will be given Toradol very strong anti-inflammatory dose for pain relief and treatment.  Please do not take any other over-the-counter NSAID today.    You will be started on prednisone.  Finish the short course as written again do not take NSAIDs while on the prednisone.    Return to your primary care provider or rheumatology if you are having any signs or symptoms of infection or other complication.      Patient Education     Treating Gout Attacks     Raising the joint above the level of your heart can help reduce gout symptoms.     Gout is a disease that affects the joints. It is caused by excess uric acid in your blood that may lead to crystals forming in your joints. Left untreated, it can lead to painful foot and joint deformities and even kidney problems. But, by treating gout early, you can relieve pain and help prevent future problems. Gout can usually be treated with medicine and proper diet. In severe cases, surgery may be needed.  Gout attacks are painful and often happen more than once. Taking medicines may reduce pain and prevent attacks in the future. There are also some things you can do at home to relieve symptoms.  Medicines for gout  Your healthcare provider may prescribe a daily medicine to reduce levels of uric acid. Reducing your uric acid levels may help prevent gout attacks. Allopurinol is one commonly used  medicine taken daily to reduce uric acid levels. Other daily medicines used to reduce uric acid levels include febuxostat, lesinurad, and probencid. Other medicines can help relieve pain and swelling during an acute attack. Medicines such as NSAIDs (nonsteroidal anti-inflammatory medicines), steroids, and colchicine may be prescribed for intermittent use to relieve an acute gout attack. Be sure to take your medicine as directed.  What you can do  Below are some things you can do at home to relieve gout symptoms. Your healthcare provider may have other tips.    Rest the painful joint as much as you can.    Raise the painful joint so it is at a level higher than your heart.    Use ice for 10 minutes every 1 to 2 hours as possible.  How can I prevent gout?  With a little effort, you may be able to prevent gout attacks in the future. Here are some things you can do:    Don't eat foods high in purines  ? Certain meats (red meat, processed meat, turkey)  ? Organ meats (kidney, liver, sweetbread)  ? Shellfish (lobster, crab, shrimp, scallop, mussel)  ? Certain fish (anchovy, sardine, herring, mackerel)    Take any medicines prescribed by your healthcare provider.    Lose weight if you need to.    Reduce high fructose corn syrup in meals and drinks.    Reduce or cut out alcohol, particularly beer, but also red wine and spirits.    Control blood pressure, diabetes, and cholesterol.    Drink plenty of water to help flush uric acid from your body.  Date Last Reviewed: 4/1/2018 2000-2019 [employer]. [address] [place] [address], [place] [address]. All rights reserved. This information is not intended as a substitute for professional medical care. Always follow your healthcare professional's instructions.           Patient Education     Gout Diet  Gout is a painful condition caused by an excess of uric acid, a waste product made by the body. Uric acid forms crystals that collect in the joints. The immune response to  these crystals brings on symptoms of joint pain and swelling. This is called a gout attack. Often, medications and diet changes are combined to manage gout. Below are some guidelines for changing your diet to help you manage gout and prevent attacks. Your health care provider will help you determine the best eating plan for you.     Eating to manage gout  Weight loss for those who are overweight may help reduce gout attacks.  Eat less of these foods  Eating too many foods containing purines may raise the levels of uric acid in your body. This raises your risk for a gout attack. Try to limit these foods and drinks:    Alcohol, such as beer and red wine. You may be told to avoid alcohol completely.    Soft drinks that contain sugar or high fructose corn syrup    Certain fish, including anchovies, sardines, fish eggs, and herring    Shellfish    Certain meats, such as red meat, hot dogs, luncheon meats, and turkey    Organ meats, such as liver, kidneys, and sweetbreads    Legumes, such as dried beans and peas    Other high fat foods such as gravy, whole milk, and high fat cheeses    Vegetables such as asparagus, cauliflower, spinach, and mushrooms used to be thought to contribute to an increased risk for a gout attack, but recent studies show that high purine vegetables don't increase the risk for a gout attack.  Eat more of these foods  Other foods may be helpful for people with gout. Add some of these foods to your diet:    Cherries contain chemicals that may lower uric acid.    Omega fatty acids. These are found in some fatty fish such as salmon, certain oils (flax, olive, or nut), and nuts themselves. Omega fatty acids may help prevent inflammation due to gout.    Dairy products that are low-fat or fat-free, such as cheese and yogurt    Complex carbohydrate foods, including whole grains, brown rice, oats, and beans    Coffee, in moderation    Water, approximately 64 ounces per day  Follow-up care  Follow up with  your healthcare provider as advised.  When to seek medical advice  Call your healthcare provider right away if any of these occur:    Return of gout symptoms, usually at night:    Severe pain, swelling, and heat in a joint, especially the base of the big toe    Affected joint is hard to move    Skin of the affected joint is purple or red    Fever of 100.4 F (38 C) or higher    Pain that doesn't get better even with prescribed medicine   Date Last Reviewed: 1/12/2016 2000-2017 The [employer]. [address] [place] [address]. All rights reserved. This information is not intended as a substitute for professional medical care. Always follow your healthcare professional's instructions.           Patient Education     Eating to Prevent Gout  Gout is a painful form of arthritis caused by an excess of uric acid. This is a waste product made by the body. It builds up in the body and forms crystals that collect in the joints, causing a gout attack. Alcohol and certain foods can trigger a gout attack. Below are some guidelines for changing your diet to help you manage gout. Your healthcare provider can work with you to determine the best eating plan for you. Know that diet is only one part of managing gout. Take your medicines as prescribed and follow the other guidelines your healthcare provider has given you.  Foods to limit  Eating too many foods containing purines may increase the levels of uric acid in your body and increase your risk for a gout attack. It may be best to limit these high-purine foods:    Alcohol (beer and red wine). You may be told to avoid alcohol completely.    Certain fish (anchovies, sardines, fish roes, herring, tuna, mussels, codfish, scallops, trout, and katarzyna)    Certain meats (red meat, processed meat, noble, turkey, wild game, and goose)    Sauces and gravies made with meat    Organ meats (such as liver, kidneys, sweetbreads, and tripe)    Legumes (such as dried beans  and peas)    Mushrooms, spinach, asparagus, and cauliflower    Yeast and yeast extract supplements  Foods to try  Some foods may be helpful for people with gout. You may want to try adding some of the following foods to your diet:    Dark berries. These include blueberries, blackberries, and cherries. These berries contain chemicals that may lower uric acid.    Tofu. Tofu, which is made from soy, is a good source of protein. Studies have shown that it may be a better choice than meat for people with gout.    Omega fatty acids. These acids are found in fatty fish (such as salmon), certain oils (such as flax, olive, or nut oils), or nuts. They may help prevent inflammation due to gout.  The following guidelines are recommended by the American Medical Association for people with gout. Your diet should be:    High in fiber, whole grains, fruits, and vegetables.    Low in protein (15% of calories should come from protein. Choose lean sources, such as soy, lean meats, and poultry).    Low in fat (no more than 30% of calories should come from fat, with only 10% coming from animal, or saturated, fat).  Date Last Reviewed: 11/1/2017 2000-2019 [employer]. [address] [place] [address], [place] [address]. All rights reserved. This information is not intended as a substitute for professional medical care. Always follow your healthcare professional's instructions.

## 2021-06-19 NOTE — LETTER
Letter by Fabian Mckeon MD at      Author: Fabian Mckeon MD Service: -- Author Type: --    Filed:  Encounter Date: 3/19/2019 Status: (Other)         March 19, 2019     Patient: Katya Purdy   YOB: 1995   Date of Visit: 3/19/2019       To Whom It May Concern:    I saw the above patient today.     If you have any questions or concerns, please don't hesitate to call.    Sincerely,        Electronically signed by Fabian Mckeon MD

## 2021-06-19 NOTE — PROGRESS NOTES
Patient is a 23-year-old male who is new to this clinic who presents today with bilateral ankle pain    Patient notes the ankle pain has been present for the past 2 weeks.  When he wakes up in the morning, and feels fine.  At the end of the day, however, it is aching.  Patient paints cars for living.  He wears regular sneakers in his work.  He did take an over-the-counter naproxen, and noticed no noticeable improvement.  He notes no swelling of the ankles.  He is standing all day and upright.  He cannot recall any particular injury.  He notes that the pain is deep within the joint, and possibly medial.    In review of his medical history, patient does have gout.  He has been only treating this on an acute basis.  Patient notes he has a gouty attack essentially every time he drinks.  He has a very elevated uric acid level.  At some point, he was started on allopurinol, but he did not see the point of taking it daily, so of course he had a flare both when he started it, and when he stopped it.  Patient has started getting tophi.  He has 1 tophi on his right fourth knuckle as well as on his left hand.  He does not have a primary doctor that he sees with any regularity.  He notes that gout does run in his family.  He notes no history of dialysis in his family.    Current meds: none    Objective     /82 (Patient Site: Left Arm, Patient Position: Sitting, Cuff Size: Adult Regular)  Pulse 76  Wt 198 lb (89.8 kg)  SpO2 98%  BMI 31.96 kg/m2  General appearance: alert, appears stated age and cooperative  Extremities: bilateral ankles   Inspection: Patient has bilateral congenital out toeing significant valgus deformity at the ankle.  He also has pes planus.  Palpation: No bony tenderness to the lateral medial malleolus.  Negative anterior drawer sign.  No tenderness to the medial or lateral ligaments.  Pulses: 2+ bilaterally  Strength: Normal dorsi and plantar flexion.  Normal internal and external  betty Aldana was seen today for ankle pain.    Diagnoses and all orders for this visit:    Tophaceous gout  -     naproxen (NAPROSYN) 500 MG tablet; Take 1 tablet (500 mg total) by mouth 2 (two) times a day as needed.  -     Uric Acid  -     HM1(CBC and Differential)  -     Comprehensive Metabolic Panel  -     HM1 (CBC with Diff)  -     allopurinol (ZYLOPRIM) 300 MG tablet; Take 1 tablet (300 mg total) by mouth daily.  -     colchicine 0.6 mg tablet; 2 tabs  followed by 1 tab one hour later prn gout flare  Patient would like naproxen to take as needed for regular joint pain.  Max the need to treat his tophaceous gout as his uric acid levels have only been rising.  He will get increasing frequency and severity of flares.  He already has some joint tophi that have formed in the interim.  We will start him on allopurinol daily to prevent gout flare.  Patient did not understand the purpose of this medication in the past.  He will try start that today.  Discussed that ideally I would like to have him on Uloric, but it is not currently covered by his insurance.  We will provide him with colchicine for an acute gouty flare.  I will check today for his conference of metabolic panel as well as his uric acid.  Discussed that he will need to have his labs repeated in 3 months.  I suggest that he stick with one primary care physician.  Patient notes that he plans on going to [facility] clinic since that is near his home.    Pes planus of both feet  Patient also has valgus deformity at the ankles.  Offered podiatry referral, the patient declines for now.  I did recommend a formal shoe fitting at [facility], since he works on his feet all day.  Patient to notify if his symptoms worsen or do not improve and certainly can put in that referral for him.    > 25 min spent with patient.  > 50% in counseling and coordination of care.

## 2021-06-19 NOTE — PROGRESS NOTES
Subjective:      Katya Purdy is a 23 y.o. male who presents for evaluation of left fourth finger pain.  The distal left fourth finger has been painful and swollen for about 4 days.  Not getting any better.  He has tried naproxen and soaking the finger in water.  Those have not helped.  He has had gout in his right hand before.  He says this does not feel like gout.  He is right-hand dominant.  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  He is now call injuring the finger, getting it caught in a car door, etc.  He is also put ice on the finger and that has not helped.  It is hard for him to describe the pain specifically.  Affected area (DIP joint to tip of finger) also feels numb.    Review of systems: Otherwise negative except as noted above in HPI    Patient Active Problem List   Diagnosis     Gout     Tobacco use disorder     Acute monoarthritis       Current Outpatient Prescriptions:      naproxen (NAPROSYN) 500 MG tablet, Take 1 tablet (500 mg total) by mouth 2 (two) times a day as needed., Disp: 30 tablet, Rfl: 0     cephalexin (KEFLEX) 500 MG capsule, Take 1 capsule (500 mg total) by mouth 3 (three) times a day for 10 days., Disp: 30 capsule, Rfl: 0     colchicine 0.6 mg tablet, 2 tabs  followed by 1 tab one hour later prn gout flare, Disp: 10 tablet, Rfl: 3     Objective:     No Known Allergies  Vitals:    08/09/18 1423   BP: 138/82   Pulse: 100   Resp: 20     Body mass index is 32.44 kg/(m^2).    Vitals reviewed as above.  General: Patient is alert and oriented x 3, in no apparent distress  Cardiac: Regular rate and rhythm, no murmurs  Pulmonary: lungs clear to ausculation, no crackles, rales, rhonchi, or wheezing  Musculoskeletal: Slightly decreased  strength in the left hand due to left fourth finger pain, distal left fourth finger does appear minimally edematous and minimally edematous as compared to the other fingers, normal capillary refill in the fourth finger,  flexion at the and extension at the DIP joint causes pain, no significant pain with palpation at the fingertip, patient feels that sensation to touch is decreased in the distal fourth finger as compared to the surrounding fingers.  Other fingers appear unaffected.  Other joints in the fourth finger (PIP and MCP) are unaffected.      I reviewed grossly normal left fourth finger x-ray today.  XR FINGER LEFT 2 OR MORE VWS  8/9/2018 2:57 PM   INDICATION: Pain in left finger(s)  COMPARISON: None.   FINDINGS: Negative fingers. No fracture or dislocation.    Assessment and Plan:     Pain in the distal left fourth finger.  X-rays grossly normal today.  No obvious causes for injury.  Given patient's symptoms, I think that cellulitis is the most likely cause.  Prescription sent for Keflex.  I reviewed continued symptomatic treatment.  I anticipate resolution of symptoms over the next several days.  Redness in the finger stops at the DIP joint.  Patient does not think it spreading proximally.  If he does not notice significant spreading of the redness he will call the clinic.    This dictation uses voice recognition software, which may contain typographical errors.

## 2021-06-19 NOTE — LETTER
Letter by Sandee Avitia MD at      Author: Sandee Avitia MD Service: -- Author Type: --    Filed:  Encounter Date: 10/30/2019 Status: Signed         October 30, 2019     Patient: Katya Purdy   YOB: 1995   Date of Visit: 10/30/2019       To Whom it May Concern:    Katya Purdy was seen in my clinic on 10/30/2019. He may return to work 10/31/19.    If you have any questions or concerns, please don't hesitate to call.    Sincerely,         Electronically signed by Sandee Olmstead MD

## 2021-06-19 NOTE — LETTER
Letter by Josue SNIDER MD at      Author: Josue SNIDER MD Service: -- Author Type: --    Filed:  Encounter Date: 8/28/2019 Status: (Other)         August 28, 2019     Patient: Katya Purdy   YOB: 1995   Date of Visit: 8/28/2019       To Whom It May Concern:    It is my medical opinion that Katya Purdy may return to work on 09/02/2019.    If you have any questions or concerns, please don't hesitate to call.    Sincerely,        Electronically signed by Josue SNIDER MD

## 2021-06-19 NOTE — LETTER
Letter by Breonna Choudhury MD at      Author: Breonna Choudhury MD Service: -- Author Type: --    Filed:  Encounter Date: 7/29/2019 Status: (Other)         July 29, 2019     Patient: Katya Purdy   YOB: 1995   Date of Visit: 7/29/2019       To Whom it May Concern:    Katya Purdy was seen in my clinic on 7/29/2019.    He may return to work on 8/1/2019 or earlier if feeling better.    If you have any questions or concerns, please don't hesitate to call.    Sincerely,         Electronically signed by Breonna Choudhury MD

## 2021-06-19 NOTE — LETTER
Letter by Evens Buenrostro MD at      Author: Evens Buenrostro MD Service: -- Author Type: --    Filed:  Encounter Date: 10/1/2019 Status: Signed         October 1, 2019     Patient: Katya Purdy   YOB: 1995   Date of Visit: 10/1/2019       To Whom It May Concern:    It is my medical opinion that Katya Purdy may return to full duty immediately with no restrictions.    If you have any questions or concerns, please don't hesitate to call.    Sincerely,        Electronically signed by Evens Buenrostro MD

## 2021-06-19 NOTE — LETTER
Letter by Evens Buenrostro MD at      Author: Evens Buenrostro MD Service: -- Author Type: --    Filed:  Encounter Date: 10/2/2019 Status: Signed         Vameng T Xiong [address] [place] [address]             October 2, 2019         Dear Mr. Purdy,    Below are the results from your recent visit:    Resulted Orders   Uric Acid   Result Value Ref Range    Uric Acid 6.6 3.0 - 8.0 mg/dL   ALT (SGPT)   Result Value Ref Range    ALT 44 0 - 45 U/L   HM2(CBC w/o Differential)   Result Value Ref Range    WBC 11.1 (H) 4.0 - 11.0 thou/uL    RBC 5.06 4.40 - 6.20 mill/uL    Hemoglobin 13.6 (L) 14.0 - 18.0 g/dL    Hematocrit 41.7 40.0 - 54.0 %    MCV 82 80 - 100 fL    MCH 26.8 (L) 27.0 - 34.0 pg    MCHC 32.5 32.0 - 36.0 g/dL    RDW 12.7 11.0 - 14.5 %    Platelets 319 140 - 440 thou/uL    MPV 6.9 (L) 7.0 - 10.0 fL   Creatinine   Result Value Ref Range    Creatinine 0.76 0.70 - 1.30 mg/dL    GFR MDRD Af Amer >60 >60 mL/min/1.73m2    GFR MDRD Non Af Amer >60 >60 mL/min/1.73m2       Vameng, your blood tests look pretty good.  We want your uric acid less than 6.0 and, considering, 6.6 is pretty good.  I think if you keep it at this level, and keep taking the allopurinol, your gout will disappear.  But you will need to stay on the allopurinol indefinitely-that is really important.  I am not sure why your white blood cell count is slightly high but I do not think it is anything serious.    Please call with questions or contact us using [employer].    Sincerely,        Electronically signed by Evens Buenrostro MD

## 2021-06-19 NOTE — LETTER
Letter by Radha Menezes CNP at      Author: Radha Menezes CNP Service: -- Author Type: --    Filed:  Encounter Date: 11/18/2019 Status: Signed         November 18, 2019     Patient: Katya Purdy   YOB: 1995   Date of Visit: 11/18/2019       To Whom It May Concern:    It is my medical opinion that Katya Purdy may return to light duty immediately with the following restrictions: no lifting with right arm.  Gripping as tolerated with rt hand.  Restrictions for 10 days. Wants to work today, but should be allowed to go home if pain is not tolerable.  .    If you have any questions or concerns, please don't hesitate to call.    Sincerely,        Electronically signed by Radha Menezes CNP

## 2021-06-19 NOTE — LETTER
Letter by Evens Buenrostro MD at      Author: Evens Buenrostro MD Service: -- Author Type: --    Filed:  Encounter Date: 4/5/2019 Status: (Other)         Katya GROVER Rajwinder  [address] [place] [address]      April 5, 2019      Dear Katya,    As a valued [facility] patient, your healthcare needs are our priority.  Your health care team has determined that you are due for an appointment regarding your gout.    To help prevent delays in your care, please call the [facility] at [phone].    We look forward to partnering with you to achieve optimal health and wellbeing.    Sincerely,  Your care team at [facility]

## 2021-06-20 ENCOUNTER — HEALTH MAINTENANCE LETTER (OUTPATIENT)
Age: 26
End: 2021-06-20

## 2021-06-20 NOTE — LETTER
Letter by Chun Roy PA-C at      Author: Chun Roy PA-C Service: -- Author Type: --    Filed:  Encounter Date: 1/9/2020 Status: Signed         January 9, 2020     Patient: Katya Purdy   YOB: 1995   Date of Visit: 1/9/2020       To Whom it May Concern:    Katya Purdy was seen in my clinic on 1/9/2020.    If you have any questions or concerns, please don't hesitate to call.    Sincerely,         Electronically signed by Chun Roy PA-C

## 2021-06-20 NOTE — PROGRESS NOTES
Assessment and Plan     Katya was seen today for left knee swollen.    Diagnoses and all orders for this visit:    Patellofemoral dysfunction of left knee  -     Ambulatory referral to Orthopedics  -     Knee DME: Knee Immoblizer    Acute pain of left knee  -     XR Knee Left Plus [place] VW  -     Ambulatory referral to Orthopedics  -     Knee DME: Knee Immoblizer       HPI     Chief Complaint   Patient presents with     left knee swollen     Started Saturday night. Sunday morning it started to hurt.        Katya Purdy is a 23 y.o. male seen today for left knee pain. On Friday he had his knee fully extended while he was leaning forward over it. [place]ear like it hyperextended, heard/felt a click from the front of his knee.  Since then he has had persistent pain, worse with weightbearing.  Also noticed some swelling.       Current Outpatient Prescriptions:      allopurinol (ZYLOPRIM) 300 MG tablet, Take 1 tablet (300 mg total) by mouth daily., Disp: 90 tablet, Rfl: 1     colchicine 0.6 mg tablet, 2 tabs  followed by 1 tab one hour later prn gout flare, Disp: 10 tablet, Rfl: 3     naproxen (NAPROSYN) 500 MG tablet, Take 1 tablet (500 mg total) by mouth 2 (two) times a day as needed., Disp: 30 tablet, Rfl: 0     Reviewed and updated: medical history, medications and allergies.     Review of Systems     General: Denies fever, chills, fatigue.  Respiratory: Denies dyspnea, cough, wheezing.  GI: Denies nausea, vomiting, diarrhea, constipation.  : Denies dysuria, polyuria.     Objective     Vitals:    08/21/18 1517   BP: 120/60   Patient Site: Right Arm   Patient Position: Sitting   Cuff Size: Adult Large   Pulse: 90   Resp: 18   Temp: 99  F (37.2  C)   TempSrc: Oral   SpO2: 96%   Weight: 196 lb (88.9 kg)        Reviewed vital signs.  General: Appears calm, comfortable. Answers questions quickly and appropriately with clear speech. No apparent distress.  Skin: Pink, warm, dry.  HENT: Normocephalic, atraumatic.  Neck:  Supple.  Cardiovascular: Strong, regular radial pulse.  Respiratory: Normal respiratory effort.  Neuro: Memory and cognition appear normal. Normal gait.  Psych: Mood and affect appear normal.  MSK: Left knee exhibits effusion.  There is no erythema or heat.  Lateral joint line tenderness is present.  The knee is stable to valgus and varus stresses and exhibits a negative Lachman's.  Jolene's was also negative.  On active extension and flexion, significant lateral movement of the patella can be felt.  Lateral and medial pressure on the patella does cause it to pop from one side to the other with an audible click.  The same finding is present on the contralateral knee.    Imaging:     XR Left Knee: No acute fracture dislocation.  There is evidence of patellofemoral dysfunction, with the patella tracking laterally and showing change in contour.  This is my personal read.    Radiology read:  Xr Knee Left Plus [place]rise Vw    Result Date: 8/21/2018  EXAM DATE:         08/21/2018 [facility] X-RAY KNEE, LEFT, 3 VIEWS 8/21/2018 3:45 PM INDICATION: knee pain, possible hyperextension injury COMPARISON: None. FINDINGS: Large knee joint effusion. No acute fracture or dislocation.       Labs:  No results found for this or any previous visit (from the past 24 hour(s)).     Medical Decision-Making     Katya is a well-appearing 23-year-old male who presents with knee pain following a possible hyperextension of the knee.  If there was an injury, occurred with relatively low energy level.  With the event he described, I think a ligamentous injury is exceedingly unlikely.  Given the significant patellofemoral dysfunction uncovered during the exam and x-ray, I think it is more likely that he elicited a larger than typical movement of his mal-tracking patella and this is the source of his knee discomfort.  He requested a knee immobilizer and given that he is experiencing significant discomfort, I think this is  reasonable.  Provided a work note stating that he should not be working on his feet.  Referred to orthopedics for further evaluation.    Reviewed red flags that would trigger a prompt return to the clinic as noted below under patient instructions.  He expressed understanding of these directions and is in agreement with the plan.     Patient Instructions     Patient Instructions   I did not find any fractures or dislocations of the bones of your knee. The ligaments seem intact as well.    I did find injury to your kneecap that has taken place very slowly over many years. I suspect this may be the source of your pain.    Please follow up with the orthopedics doctor as soon as you can get in.    Use acetaminophen 1000mg every 6 hours for pain. Add in ibuprofen 400mg as needed, up to every 6 hours.    Please return to the clinic if you notice any of the following:    Fever / chills.    Worsening pain.    Redness around the joint.        Discussed benefit vs risk of medications, dosing, side effects.  Patient was able to verbalize understanding.  After visit summary was provided for patient.     Heber Macdonald PA-C

## 2021-06-20 NOTE — PROGRESS NOTES
Assessment/ Plan  Problem List Items Addressed This Visit        Unprioritized    Gout - Primary     MTP joints, right foot, 2 weeks.  Takes allopurinol daily, previously colchicine as needed  Not sure what has worked well in the past for outbreaks but knows that naproxen has not    Plan  Continue daily allopurinol and check uric acid  Add daily colchicine for prevention  Check ALT, creatinine and hemogram  Prednisone today for acute attack, refills for future use  Discussed side effects of this medication         Relevant Orders    HM2(CBC w/o Differential) (Completed)    Uric Acid (Completed)    ALT (SGPT) (Completed)    Creatinine (Completed)    Tobacco use disorder     Smoking cessation  Greater than 5 minutes spent discussing smoking cessation today.  Patient is in the contemplation - ambivalent about change regarding quitting.  Patient has quit before, most recent times got irritable when he tried to quit  Motivational interview technique used to explore patients desire to quit.  In the end, decided on the following intervention  Will set quit date in next 2 weeks?  Not ready for this             Healthcare maintenance     Declined flu shot             Subjective  CC:  Chief Complaint   Patient presents with     Gout     Right toe swollen x2 weeks - Pt. thinks it is gout      HPI:  Follow Up Gout  Narrative: Patient with history of gout on a number of occasions presents with right MTP swelling that came on very least slowly but otherwise feels like gout.  Present for 2 weeks, does not have anything for rescue  Recent flares?  Currently has one, before that was doing well  Medications:  PRN/Flares: None, has tried naproxen which has not been helpful, also on colchicine in the past, not clear whether this helped, he thinks prednisone has prevention: Allopurinol 300, states his only missed one day  Comment of dietary efforts/ alcohol intake: Has worked on alcohol reduction  Note, below labs were drawn today and  have not yet been addressed  Lab Results   Component Value Date    URICACID 13.4 (H) 09/26/2018     Lab Results   Component Value Date    CREATININE 0.81 09/26/2018     Lab Results   Component Value Date    WBC 11.4 (H) 09/26/2018    HGB 14.7 09/26/2018    HCT 45.6 09/26/2018    MCV 85 09/26/2018     09/26/2018     Lab Results   Component Value Date    ALT 91 (H) 09/26/2018         PFSH:  Patient Active Problem List   Diagnosis     Gout     Tobacco use disorder     Acute monoarthritis     Healthcare maintenance     Current medications reviewed as follows:  Current Outpatient Prescriptions on File Prior to Visit   Medication Sig     allopurinol (ZYLOPRIM) 300 MG tablet Take 1 tablet (300 mg total) by mouth daily.     colchicine 0.6 mg tablet 2 tabs  followed by 1 tab one hour later prn gout flare     [DISCONTINUED] naproxen (NAPROSYN) 500 MG tablet TAKE 1 TABLET BY MOUTH TWICE DAILY AS NEEDED     No current facility-administered medications on file prior to visit.      History   Smoking Status     Current Every Day Smoker     Types: Cigarettes   Smokeless Tobacco     Never Used     Comment: 5 cigarettes of marlboro reds per day     Social History     Social History Narrative     Patient Care Team:  MIREYA Smith as PCP - General  ROS  Full 10 system review including constitutional, respiratory, cardiac, gi, urinary, rheumatologic, neurologic, reproductive, dermatologic psychiatric is  performed  and is otherwise negative         Objective  Physical Exam  Vitals:    09/26/18 1113   BP: 126/72   Patient Site: Right Arm   Patient Position: Sitting   Cuff Size: Adult Regular   Pulse: 80   Resp: 16   Temp: 98.4  F (36.9  C)   TempSrc: Oral   Weight: 195 lb 8 oz (88.7 kg)     Body mass index is 31.55 kg/(m^2).  Warm swollen right MTP joints.  Otherwise foot, lower calf, skin looks normal.  Patient is in no distress  Diagnostics:   Listed above      Please note: Voice recognition software was used in this  dictation.  It may therefore contain typographical errors.

## 2021-06-22 NOTE — PROGRESS NOTES
Internal Medicine Office Visit  [facility]  Patient Name: Katya Purdy  Patient Age: 23 y.o.  YOB: 1995  MRN: [id number]    Date of Visit: 2018  Reason for Office Visit:   Chief Complaint   Patient presents with     Hospital Visit Follow Up     Right shoulder pain. Needing work note to return to work without restrictions.            Assessment / Plan / Medical Decision Makin. Acute pain of right shoulder  Recommend the patient continue on the prednisone burst of 40 mg daily for the next 4 days, continue on allopurinol 300 mg daily, he may alternate between ibuprofen and Tylenol on a scheduled basis.  Recommend patient refrain from doing activities which exacerbate his symptoms.  Patient is once again requesting to go back to work full-time without restriction did recommend that patient make  of occasions when working to ensure no increased pain and discomfort.  Patient is advised if his symptoms persist worsen or new symptoms arise he is to seek medical care and would recommend a follow-up in 1 week, sooner if needed.  All patient's questions addressed he verbalized understanding and agreement with plan.        Health Maintenance Review  Health Maintenance   Topic Date Due     TD 18+ HE  2016     ADVANCE DIRECTIVES DISCUSSED WITH PATIENT  2021     INFLUENZA VACCINE RULE BASED  Completed     TDAP ADULT ONE TIME DOSE  Completed     HPV VACCINES  Aged Out         I have discontinued Katya Purdy's naproxen, indomethacin. I am also having him maintain his allopurinol, predniSONE, and HYDROcodone-acetaminophen.      HPI:  Katya Purdy is a 23 y.o. year old who presents to the office today for follow-up after emergency room visit.  Patient was seen emergency room yesterday due to pain and discomfort without injury.  Patient did have a full workup was given a shot of Toradol prescription for prednisone hydrocodone, was advised  to continue on his allopurinol as it was thought this was related to gout and notes significant relief of symptoms.  He rated his pain a 9 out of 10 yesterday and he rates it a 3 out of 10 today and has not utilized any pain medication so continues on prednisone as prescribed.  Patient reports increased pain and discomfort with abduction or moving his right arm across his body.  No numbness, or tingling.  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  He states that he is able to make modifications to his body mechanics by holding his arm at a 45 degree angle is closer to his body during he does not have pain and discomfort with this change in positioning when working.        Review of Systems- pertinent positive in bold:  Constitutional: Fever, chills,   Musculoskeletal: See HPI  Hematologic/lymphatic: swollen lymph glands, abnormal bruising/bleeding  Neurologic/emotional:  numbness/tingling      Current Scheduled Meds:  Outpatient Encounter Medications as of 12/11/2018   Medication Sig Dispense Refill     allopurinol (ZYLOPRIM) 300 MG tablet Take 1 tablet (300 mg total) by mouth daily. 90 tablet 1     HYDROcodone-acetaminophen 5-325 mg per tablet Take 1 tablet by mouth every 6 (six) hours as needed for pain. 5 tablet 0     predniSONE (DELTASONE) 20 MG tablet Take 40 mg by mouth daily for 4 days. 8 tablet 0     [DISCONTINUED] oxyCODONE-acetaminophen (PERCOCET/ENDOCET) 5-325 mg per tablet Take 1 tablet by mouth every 6 (six) hours as needed for pain. 10 tablet 0     [DISCONTINUED] ibuprofen (ADVIL,MOTRIN) 600 MG tablet Take 1 tablet (600 mg total) by mouth every 6 (six) hours as needed for pain. 60 tablet 0     [DISCONTINUED] indomethacin (INDOCIN) 50 MG capsule Three times a day prn gout 30 capsule 1     [DISCONTINUED] naproxen (NAPROSYN) 500 MG tablet Take 1 tablet (500 mg total) by  "mouth 2 (two) times a day with meals. 14 tablet 0     No facility-administered encounter medications on file as of 12/11/2018.      Past Medical History:   Diagnosis Date     Gout      PDA (patent ductus arteriosus)      No past surgical history on file.  Social History     Tobacco Use     Smoking status: Current Every Day Smoker     Types: Cigarettes     Smokeless tobacco: Never Used     Tobacco comment: 5 cigarettes of marlboro reds per day   Substance Use Topics     Alcohol use: Not on file     Drug use: Not on file       Objective / Physical Examination:  Vitals:    12/11/18 1108   BP: 138/78   Pulse: 78   SpO2: 98%   Weight: 193 lb (87.5 kg)   Height: 5' 7\" (1.702 m)     Wt Readings from Last 3 Encounters:   12/11/18 193 lb (87.5 kg)   12/10/18 200 lb (90.7 kg)   12/03/18 194 lb 1.6 oz (88 kg)     Body mass index is 30.23 kg/m .     General Appearance: Alert and oriented, cooperative, affect appropriate, speech clear, in no apparent distress  Head: Normocephalic, atraumatic  Eyes Conjunctivae clear and sclerae non-icteric  Lungs:  Normal inspiratory and expiratory effort  Extremities: Pulses 2+ and equal throughout. No edema. Strength equal throughout.  His right anterior shoulder pain upon palpation increases with abduction and movement across his body.  Integumentary: Warm and dry. Without suspicious looking lesions  Neuro: Alert and oriented, follows commands appropriately.     No orders of the defined types were placed in this encounter.  Followup: Return in about 7 days (around 12/18/2018), or if symptoms worsen or fail to improve. earlier if needed.          Tasneem Cowan, CNP  "

## 2021-06-22 NOTE — PROGRESS NOTES
Assessment/ Plan  Problem List Items Addressed This Visit        Unprioritized    Gout - Primary    Relevant Orders    Ambulatory referral to Rheumatology    Acute idiopathic gout of right shoulder     This patient most likely has gout  Previous MTP joint swelling, however gradual onset  Very elevated uric acid    Inconsistencies with improvement with anti-inflammatories    He now is having frequent outbreaks of what ever intra-articular process is causing him problem  Presume this is gout with instigation of allopurinol treatment causing recurrent outbreaks.  Patient is not on national prophylactic therapy because insurance would not cover colchicine    Plan  For now, prolonged prednisone taper to knock out current older symptoms  Start daily colchicine, will likely require prior authorization  Referral to rheumatology  Discussed all of this with him.  Did prescribe 15 hydrocodone.  Stressed that this is not a good long-term option             Subjective  CC:  Chief Complaint   Patient presents with     Pain     Shoulder and leg pain - Right side      HPI:  Patient was seen initially 9/26/18 with recurrent MTP swelling which seemed to be gout, with very high uric acid levels, but.  The only atypical part of the presentation was the rapidity in which onset came.  Is been started on allopurinol 300 mg    Was subsequently seen back on 11/7/18 with right elbow pain, which he felt was gout.  He complained that he was unable to get colchicine covered by his insurance, prednisone was not effective, naproxen was not effective, prescribed 50 mg of indomethacin.    He was subsequently seen in urgent care 12/3/18 with what was felt to be acute gout in the right ankle.  Treated with prednisone since indomethacin did not help, given 8 Vicodin    Then, seen with right shoulder pain, emergency room 12/10 this was felt to be gout as well.  Again placed on prednisone.  Continues allopurinol.    Patient presents today with right  shoulder pain.  He was placed on 5 days of prednisone.  Initially felt better on prednisone, now worsening again.  Wearing a sling, hurts to move.  Requesting more hydrocodone.    Patient also states that his arm and his feet hurt severely some mornings when he wakes up.    Reviewed his uric acid level.  This was 13.2 on 8/16/18 when he was first seeing us.  Follow-up, 12/3/18 was 8.6.  This is on 300 mg allopurinol.  Review of other labs include CCP being normal 9/17/18, rheumatoid factor less than 15 same date.  Most recent CRP 12/10 was 1.6, sed rate on the same date was 7  PFSH:  Patient Active Problem List   Diagnosis     Gout     Tobacco use disorder     Acute monoarthritis     Healthcare maintenance     Tophaceous gout     Acute idiopathic gout of right shoulder     Current medications reviewed as follows:  Current Outpatient Medications on File Prior to Visit   Medication Sig     allopurinol (ZYLOPRIM) 300 MG tablet Take 1 tablet (300 mg total) by mouth daily.     predniSONE (DELTASONE) 20 MG tablet Take 40 mg by mouth daily for 4 days.     [DISCONTINUED] HYDROcodone-acetaminophen 5-325 mg per tablet Take 1 tablet by mouth every 6 (six) hours as needed for pain.     No current facility-administered medications on file prior to visit.      Social History     Tobacco Use   Smoking Status Current Every Day Smoker     Types: Cigarettes   Smokeless Tobacco Never Used   Tobacco Comment    5 cigarettes of marlboro reds per day     Social History     Social History Narrative     Not on file     Patient Care Team:  Evens Buenrostro MD as PCP - General (Family Medicine)  ROS  Full 10 system review including constitutional, respiratory, cardiac, gi, urinary, rheumatologic, neurologic, reproductive, dermatologic psychiatric is  performed  and is otherwise negative         Objective  Physical Exam  Vitals:    12/13/18 1101   BP: 122/82   Patient Site: Left Arm   Patient Position: Sitting   Cuff Size: Adult Regular    Pulse: 64   Resp: 16   Weight: 192 lb 4 oz (87.2 kg)     Body mass index is 30.11 kg/m .  Patient has right arm in a sling, right shoulder briefly examined and does not feel at all warm to the touch.  Reviewed previous reports above which seem to suggest acute shoulder arthritis  Diagnostics:   None today      Please note: Voice recognition software was used in this dictation.  It may therefore contain typographical errors.

## 2021-06-22 NOTE — PROGRESS NOTES
"ASSESMENT AND PLAN:  Diagnoses and all orders for this visit:    Right ankle pain, unspecified chronicity  -     Ambulatory referral to Podiatry  He will take ibuprofen as needed.      SUBJECTIVE: Katya baldwin is a 23-year-old male with history of gout here with right ankle pain for 1 day.  No injury prior to the pain.  No swelling or redness.  He has similar problems multiple times in the past, always at the right ankle.  He works as a .  He does not play any sports.    Past Medical History:   Diagnosis Date     Gout      PDA (patent ductus arteriosus)      Patient Active Problem List   Diagnosis     Gout     Tobacco use disorder     Acute monoarthritis     Healthcare maintenance     Tophaceous gout       Allergies:  No Known Allergies    Social History     Tobacco Use   Smoking Status Current Every Day Smoker     Types: Cigarettes   Smokeless Tobacco Never Used   Tobacco Comment    5 cigarettes of marlboro reds per day       Review of systems otherwise negative except as listed in HPI.   Social History     Tobacco Use   Smoking Status Current Every Day Smoker     Types: Cigarettes   Smokeless Tobacco Never Used   Tobacco Comment    5 cigarettes of marlboro reds per day       OBJECTICE: /80 (Patient Site: Right Arm, Patient Position: Sitting, Cuff Size: Adult Regular)   Pulse 72   Temp 98.2  F (36.8  C) (Oral)   Resp 16   Ht 5' 7\" (1.702 m)   Wt 192 lb 6 oz (87.3 kg)   BMI 30.13 kg/m          GEN-alert,  in no apparent distress.  Limping gait due to pain.  LEFT ankle- Normal.  Right ankle-no tenderness on Achilles tendon.  Pain reproducible with flexion of the ankle.  No swelling or erythema noted  SKIN-normal        Ky Kohli   11/30/2018   "

## 2021-06-22 NOTE — PROGRESS NOTES
Chief Complaint   Patient presents with     Ankle Pain     x 1 week, R/t knee is in pain       HPI:  Katya Purdy is a 23 y.o. male who presents today complaining of right ankle pain times 1 week.  He started having knee pain and warmth to palpation 1 day ago.  Patient has a significant history for gout and is currently on allopurinol.  Since starting allopurinol he has continued to have flareups of multiple joints including knees and elbows.  He has struggled with treatments that have worked for him.  His last medication was indomethacin which did not improve his symptoms.  He states that he once took Norco which seemed to improve his symptoms after 1 day.  Looking at his previous notes in addition to the Norco he was also on prednisone.  No known injury.  Patient has been using crutches to ambulate.      History obtained from the patient.    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   Musculoskeletal: Positive for arthralgias (Right ankle and knee), gait problem and joint swelling.   Skin: Negative for color change and rash.       Vitals:    12/03/18 1323   BP: 122/80   Pulse: 80   Temp: 98.9  F (37.2  C)   TempSrc: Oral   SpO2: 98%   Weight: 194 lb 1.6 oz (88 kg)       Physical Exam   Constitutional: He appears well-developed and well-nourished. No distress.   HENT:   Head: Normocephalic and atraumatic.   Right Ear: External ear normal.   Left Ear: External ear normal.   Eyes:  Conjunctivae are normal. Right eye exhibits no discharge. Left eye exhibits no discharge.   Pulmonary/Chest: Effort normal. No respiratory distress.   Musculoskeletal:        Right knee: He exhibits decreased range of motion. He exhibits no swelling, no deformity, no laceration and no erythema. No tenderness found.        Right ankle: He exhibits normal range of motion, no swelling and no deformity. Tenderness. Medial malleolus and AITFL tenderness found. Achilles tendon exhibits no pain, no defect and normal Hinton's test results.   Warmth to palpation of the Right knee.  No TTP of the right calf.    Skin: He is not diaphoretic.   Psychiatric: He has a normal mood and affect. His behavior is normal. Judgment and thought content normal.         Clinical Decision Making:  Suspect gout considering patient's past medical history.  Uric acid test was drawn today since there has not been one in 2 months.  Recommend close follow-up with his primary care provider.  An appointment was made for this week. Patient was treated with Prednisone since he stated that Indomethacin did not help last time. He also has low suspicion of Prednisone helping. Patient was only give 8 Norco for pain relief, as he has involvement of 2 joints.     At the end of the encounter, I discussed results, diagnosis, medications. Discussed red flags for immediate return to clinic/ER, as well as indications for follow up if no improvement. Patient understood and agreed to plan. Patient was stable for discharge.    1. Acute gout of right ankle, unspecified cause  predniSONE (DELTASONE) 20 MG tablet    HYDROcodone-acetaminophen 5-325 mg per tablet    Uric Acid         Patient Instructions   1. Begin taking Prednisone. After 24 hours if no improvement in pain control. May take Norco as needed.   2. You will be notified of your uric acid results.   3. Follow up with  on Friday.

## 2021-06-23 NOTE — PROGRESS NOTES
Assessment/ Plan  Problem List Items Addressed This Visit        Unprioritized    Acute idiopathic gout of right shoulder - Primary     Recurrence of right shoulder pain arthritis, presumed gout  Prednisone 50 mg for 5 days, refill Vicodin No. 15    Attempt for prior authorization for colchicine to add allopurinol for prophylaxis    Patient is not yet seen or heard from the rheumatologist.  They attempted to contact him according to the chart.  Will have him work with our  to try to get an appointment.         Relevant Orders    Ambulatory referral to Rheumatology        Subjective  CC:  Chief Complaint   Patient presents with     Shoulder Injury     Right shouder pain started on 01/29/19     HPI:  Patient presents for 2 days of right shoulder pain, came on when he was sleeping, similar to previous past problems presumed gout.  See previous note.  Pain is present at rest and with movement.    Seen before in a number of occasions, negative x-rays, has what is been presumed gout.  This based on shoulder pain with elevated white blood cell count, what appears to be acute arthritis, markedly elevated allopurinol with levels in the 11-13 range off of medication, most recent 8.6 on allopurinol.    Insurance is denied colchicine    He was seen last month for similar problem here.  I referred him to rheumatology to confirm diagnosis of gout causing his shoulder pain and with the purpose of trying to find a manageable solution for the gout should they think that is the problem.      He has had a CCP done which is normal, his most recent CRP 12/10/18 was normal, though one dated 9/17/18 was 3.1 rheumatoid factor was normal, ROGER negative    Shoulder x-ray done 12/10/18 (right) was negative.  PFSH:  Patient Active Problem List   Diagnosis     Gout     Tobacco use disorder     Acute monoarthritis     Healthcare maintenance     Tophaceous gout     Acute idiopathic gout of right shoulder     Current medications reviewed  as follows:  Current Outpatient Medications on File Prior to Visit   Medication Sig     acetaminophen (TYLENOL) 160 mg/5 mL (5 mL) Soln solution Take 27.3 mL (872 mg total) by mouth every 4 (four) hours as needed.     allopurinol (ZYLOPRIM) 300 MG tablet Take 1 tablet (300 mg total) by mouth daily.     [DISCONTINUED] predniSONE (DELTASONE) 10 mg tablet 60 mg [po qd x 3 d, 50 mg po qd x 3 d, 40 mg po qd x 3 d, 30 po qd x 3 d, 20 mg po qd x 7 d, 10 po qd x 7 d then stop.     [DISCONTINUED] colchicine 0.6 mg tablet 1 po qd.     [DISCONTINUED] HYDROcodone-acetaminophen 5-325 mg per tablet Take 1 tablet by mouth every 6 (six) hours as needed for pain.     No current facility-administered medications on file prior to visit.      Social History     Tobacco Use   Smoking Status Current Every Day Smoker     Types: Cigarettes   Smokeless Tobacco Never Used   Tobacco Comment    5 cigarettes of marlboro reds per day     Social History     Social History Narrative     Not on file     Patient Care Team:  Evens Buenrostro MD as PCP - General (Family Medicine)  ROS  Full 10 system review including constitutional, respiratory, cardiac, gi, urinary, rheumatologic, neurologic, reproductive, dermatologic psychiatric is  performed  and is otherwise negative         Objective  Physical Exam  Vitals:    01/30/19 0850   BP: 128/76   Patient Site: Left Arm   Patient Position: Sitting   Cuff Size: Adult Regular   Pulse: 76   Resp: 20   Weight: 192 lb 8 oz (87.3 kg)     Body mass index is 30.15 kg/m .  Patient is guarding his right shoulder, not moving much at all, warm shoulder joint on exam, range of motion not done due to extreme pain  Diagnostics:   None      Please note: Voice recognition software was used in this dictation.  It may therefore contain typographical errors.

## 2021-06-23 NOTE — TELEPHONE ENCOUNTER
PA was completed thru patient's [employer] insurance and was denied.  Insurance states that in order for them to cover the colchicine 0.6 mg tablets the patient must try and fail probenecid/colchicine tablets 500mg-0.5mg.  Please see denial information listed below for additional information.

## 2021-06-23 NOTE — TELEPHONE ENCOUNTER
Please apply for prior authorization on colchicine 0.6 mg daily for prevention of gout.  Patient has had multiple recurrences despite allopurinol use.  This is recommended for initiation of this medication anyway

## 2021-06-23 NOTE — TELEPHONE ENCOUNTER
----- Message from Evens Buenrostro MD sent at 1/30/2019 11:40 AM CST -----  This patient should be on colchicine 0.6 mg p.o. daily for prophylaxis of gout.  This medication was apparently denied by insurance.  I would like to apply for prior authorization

## 2021-06-25 NOTE — TELEPHONE ENCOUNTER
Called [facility] no answer lmcb to pt Monday to schedule   I called [facility] resent referral [phone] Pt.will go to first avail appt.

## 2021-06-25 NOTE — PROGRESS NOTES
Chief Complaint   Patient presents with     Wrist Pain     started a few days ago, no known injury         HPI    Patient is here for a few dean of moderate constant left wrist pain without injury. He is currently taking Prednisone 50 mg once daily for gout without relief. His insurance would not cover colchicine. He said indomethacin did not help him in the past. No recent injury to wrist. No fever, chills, redness of the left wrist.     ROS: Pertinent ROS noted in HPI.     No Known Allergies    Patient Active Problem List   Diagnosis     Gout     Tobacco use disorder     Acute monoarthritis     Healthcare maintenance     Tophaceous gout     Acute idiopathic gout of right shoulder       No family history on file.    Social History     Socioeconomic History     Marital status: Single     Spouse name: Not on file     Number of children: Not on file     Years of education: Not on file     Highest education level: Not on file   Occupational History     Not on file   Social Needs     Financial resource strain: Not on file     Food insecurity:     Worry: Not on file     Inability: Not on file     Transportation needs:     Medical: Not on file     Non-medical: Not on file   Tobacco Use     Smoking status: Current Some Day Smoker     Types: Cigarettes     Smokeless tobacco: Never Used     Tobacco comment: 5 cigarettes of marlboro reds per day   Substance and Sexual Activity     Alcohol use: Not on file     Drug use: Not on file     Sexual activity: Not on file   Lifestyle     Physical activity:     Days per week: Not on file     Minutes per session: Not on file     Stress: Not on file   Relationships     Social connections:     Talks on phone: Not on file     Gets together: Not on file     Attends [employer] service: Not on file     Active member of club or organization: Not on file     Attends meetings of clubs or organizations: Not on file     Relationship status: Not on file     Intimate partner violence:     Fear of  current or ex partner: Not on file     Emotionally abused: Not on file     Physically abused: Not on file     Forced sexual activity: Not on file   Other Topics Concern     Not on file   Social History Narrative     Not on file         Objective:    Vitals:    03/19/19 1612   BP: 122/77   Pulse: 85   Resp: 14   Temp: 97.5  F (36.4  C)   SpO2: 99%       Gen:NAD  MSK: mild swelling around left wrist without erythema. Normal inspection of left arm and hand. Mild tenderness around left wrist to palpation. Normal palpation of left forearm, and hand.  Reduced ROM of left wrist in all planes due to pain. Full ROM of left elbow and hand.       Acute gout of left wrist, unspecified cause  -     predniSONE (DELTASONE) 20 MG tablet; Take 3 tablets once daily for 3 days, 2 tabs once daily for 3 days, then one tab once daily for 3 days..  -     HYDROcodone-acetaminophen 5-325 mg per tablet; Take 1 tablet by mouth every 4 (four) hours as needed for pain.      Patient was referred to [facility] but he has not called them. Advised patient to call for [facility] appt.

## 2021-06-25 NOTE — TELEPHONE ENCOUNTER
I called [facility] [phone] they tried calling him x2 to no avail he can call [phone] #2 to get scheduled I also placed a call to [facility] to see if he can get in sooner (pt having flare up now)  03/15/[facility]

## 2021-06-25 NOTE — TELEPHONE ENCOUNTER
Medication Request  Medication name: prednisone   Pharmacy Name and Location: [facility] #[id number]  Reason for request: Patient states he has been having another gouty attack for the last 3 days in his right shoulder. He was scheduled to be seen earlier this week, but was unable to leave work. He is hopeful this can be sent into his pharmacy, but plans to be seen in [facility] after work if he has not heard back from the clinic.   When did you use medication last?:  2/03/2019  Patient offered appointment:  patient declined due to lack of availability   Okay to leave a detailed message: yes

## 2021-06-25 NOTE — TELEPHONE ENCOUNTER
Called and spoke with pt , Message was given, pt understood, no further questions.  Never got a call from rheumatology.   Please call and help make appointment for patient.

## 2021-06-27 NOTE — PROGRESS NOTES
Progress Notes by Breonna Choudhury MD at 7/29/2019  5:30 PM     Author: Breonna Choudhury MD Service: -- Author Type: Physician    Filed: 7/30/2019 12:19 PM Encounter Date: 7/29/2019 Status: Signed    : Breonna Choudhury MD (Physician)         Subjective:   Katya Purdy is a 24 y.o. male  Roomed by: Delfin X RMA    Refills needed? No    Do you have any forms that need to be filled out? No      Chief Complaint   Patient presents with   ? Wrist Pain     left hand been feeling pain for a couple weeks now per pt    Patient states that he has had problems with his gout for several years.  Says that his left wrist and hand have been swollen, and painful with gout for the last 1.5 weeks.  Says that he has been to the emergency department several times in the last 1-1/2 weeks.  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  He was given a 5-day course of prednisone 60 mg and his last dose was today. Last took colchicine 2 weeks ago. He also has had indomethacin in the last 10 days. Has an appointment to see a non [facility] Rheumatologist next week and Dr. Guerrero on 9/11/2019.  Patient says that he has been trying to avoid foods and drinks that would precipitate a gout attack.  Review of Systems  See HPI for ROS, otherwise balance of other systems negative    No Known Allergies    Current Outpatient Medications:   ?  acetaminophen (TYLENOL) 160 mg/5 mL (5 mL) Soln solution, Take 27.3 mL (872 mg total) by mouth every 4 (four) hours as needed., Disp: 150 mL, Rfl: 2  ?  allopurinol (ZYLOPRIM) 300 MG tablet, Take 1 tablet (300 mg total) by mouth daily., Disp: 90 tablet, Rfl: 1  ?  allopurinol (ZYLOPRIM) 300 MG tablet, Take 1 tablet (300 mg total) by mouth daily., Disp: 30 tablet, Rfl: 0  ?  colchicine 0.6 mg tablet, Take 1 tablet (0.6 mg total) by mouth daily., Disp: 30 tablet, Rfl: 6  ?  ibuprofen (ADVIL,MOTRIN) 200 MG tablet, Take 200 mg by mouth every 6 (six) hours as needed for pain., Disp: ,  Rfl:   ?  ibuprofen (ADVIL,MOTRIN) 600 MG tablet, Take 1 tablet (600 mg total) by mouth every 8 (eight) hours as needed for pain., Disp: 30 tablet, Rfl: 0  ?  indomethacin (INDOCIN) 50 MG capsule, Take 1 capsule (50 mg total) by mouth 3 (three) times a day with meals., Disp: 30 capsule, Rfl: 0  ?  indomethacin (INDOCIN) 50 MG capsule, Take 1 capsule (50 mg total) by mouth 2 (two) times a day with meals., Disp: 30 capsule, Rfl: 0  ?  predniSONE (DELTASONE) 20 MG tablet, Take 60 mg by mouth daily for 5 days., Disp: 15 tablet, Rfl: 0  Patient Active Problem List   Diagnosis   ? Gout   ? Tobacco use disorder   ? Acute monoarthritis   ? Healthcare maintenance   ? Tophaceous gout   ? Acute gouty arthritis   ? S/P arthrocentesis     Past Medical History:   Diagnosis Date   ? Gout    ? PDA (patent ductus arteriosus)     - if none on file, see Problem List    Objective:     Vitals:    07/29/19 1821   BP: 143/80   Patient Site: Right Arm   Patient Position: Sitting   Cuff Size: Adult Regular   Pulse: (!) 104   Resp: 17   Temp: 98.5  F (36.9  C)   TempSrc: Oral   SpO2: 97%   Weight: 192 lb 2 oz (87.1 kg)   General-patient looks anxious and in pain  Musculoskeletal- left wrist and hand is diffusely swollen and tender to palpate  No results found for this visit on 07/29/19.  No results found.     Assessment - Plan   Medical Decision Making -24-year-old man with a history of gout presents with continued gout flare of his left wrist and hand.  Patient has failed colchicine, indomethacin and recently had a 5-day burst of prednisone 60 mg.  Today he is in some distress with pain.  We will give him a prednisone taper over the next 12 days and since he was having some trouble sleeping oxycodone as needed for sleep.  Patient is to see a rheumatologist next week and a [facility] rheumatologist in September.  Review reviewed with patient's some of the foods that could precipitate a gout attack and he was unaware of turkey.  See patient  instructions.    1. Acute gout of left wrist, unspecified cause  - predniSONE (DELTASONE) 20 MG tablet; Take 60 mg by mouth daily for 2 days, THEN 40 mg daily for 4 days, THEN 30 mg daily for 4 days, THEN 20 mg daily for 4 days.  Dispense: 24 tablet; Refill: 0  - oxyCODONE (ROXICODONE) 5 MG immediate release tablet; Take 1 tablet (5 mg total) by mouth at bedtime as needed for pain.  Dispense: 12 tablet; Refill: 0      At the conclusion of the encounter, assessment and plan were discussed.   All questions were answered.   The patient or guardian acknowledged understanding and was involved in the decision making regarding the overall care plan.    Patient Instructions     1. Keep appointment with Rheumatologist next week  2. If symptoms are getting worse over time or you have any questions, call the clinic number - it's answered 24/7      Patient Education     Gout Diet  Gout is a painful condition caused by an excess of uric acid, a waste product made by the body. Uric acid forms crystals that collect in the joints. The immune response to these crystals brings on symptoms of joint pain and swelling. This is called a gout attack. Often, medications and diet changes are combined to manage gout. Below are some guidelines for changing your diet to help you manage gout and prevent attacks. Your health care provider will help you determine the best eating plan for you.     Eating to manage gout  Weight loss for those who are overweight may help reduce gout attacks.  Eat less of these foods  Eating too many foods containing purines may raise the levels of uric acid in your body. This raises your risk for a gout attack. Try to limit these foods and drinks:    Alcohol, such as beer and red wine. You may be told to avoid alcohol completely.    Soft drinks that contain sugar or high fructose corn syrup    Certain fish, including anchovies, sardines, fish eggs, and herring    Shellfish    Certain meats, such as red meat, hot  dogs, luncheon meats, and turkey    Organ meats, such as liver, kidneys, and sweetbreads    Legumes, such as dried beans and peas    Other high fat foods such as gravy, whole milk, and high fat cheeses    Vegetables such as asparagus, cauliflower, spinach, and mushrooms used to be thought to contribute to an increased risk for a gout attack, but recent studies show that high purine vegetables don't increase the risk for a gout attack.  Eat more of these foods  Other foods may be helpful for people with gout. Add some of these foods to your diet:    Cherries contain chemicals that may lower uric acid.    Omega fatty acids. These are found in some fatty fish such as salmon, certain oils (flax, olive, or nut), and nuts themselves. Omega fatty acids may help prevent inflammation due to gout.    Dairy products that are low-fat or fat-free, such as cheese and yogurt    Complex carbohydrate foods, including whole grains, brown rice, oats, and beans    Coffee, in moderation    Water, approximately 64 ounces per day  Follow-up care  Follow up with your healthcare provider as advised.  When to seek medical advice  Call your healthcare provider right away if any of these occur:    Return of gout symptoms, usually at night:    Severe pain, swelling, and heat in a joint, especially the base of the big toe    Affected joint is hard to move    Skin of the affected joint is purple or red    Fever of 100.4 F (38 C) or higher    Pain that doesn't get better even with prescribed medicine   Date Last Reviewed: 1/12/2016 2000-2017 [employer]. [address] [place] [address], [place] [address]. All rights reserved. This information is not intended as a substitute for professional medical care. Always follow your healthcare professional's instructions.           Patient Education     Treating Gout Attacks     Raising the joint above the level of your heart can help reduce gout symptoms.     Gout is a disease that affects the  joints. It is caused by excess uric acid in your blood stream that may lead to crystals forming in your joints. Left untreated, it can lead to painful foot and joint deformities and even kidney problems. But, by treating gout early, you can relieve pain and help prevent future problems. Gout can usually be treated with medication and proper diet. In severe cases, surgery may be needed.  Gout attacks are painful and often happen more than once. Taking medications may reduce pain and prevent attacks in the future. There are also some things you can do at home to relieve symptoms.  Medications for gout  Your healthcare provider may prescribe a daily medication to reduce levels of uric acid. Reducing your uric acid levels may help prevent gout attacks. Allopurinol is one commonly used medication taken daily to reduce uric acid levels. Other medications can help relieve pain and swelling during an acute attack. Medicines such as NSAIDs (nonsteroidal anti-inflammatory medicines), steroids, and colchicine may be prescribed for intermittent use to relieve an acute gout attack. Be sure to take your medication as directed.  What you can do  Below are some things you can do at home to relieve gout symptoms. Your healthcare provider may have other tips.    Rest the painful joint as much as you can.    Raise the painful joint so it is at a level higher than your heart.    Use ice for 10 minutes every 1-2 hours as possible.  How can I prevent gout?  With a little effort, you may be able to prevent gout attacks in the future. Here are some things you can do:    Avoid foods high in purines  ? Certain meats (red meat, processed meat, turkey)  ? Organ meats (kidney, liver, sweetbread)  ? Shellfish (lobster, crab, shrimp, scallop, mussel)  ? Certain fish (anchovy, sardine, herring, mackerel)    Take any medications prescribed by your healthcare provider.    Lose weight if you need to.    Reduce high fructose corn syrup in meals and  drinks.    Reduce or eliminate consumption of alcohol, particularly beer, but also red wine and spirits.    Control blood pressure, diabetes, and cholesterol.    Drink plenty of water to help flush uric acid from your body.  Date Last Reviewed: 2/1/2016 2000-2017 [employer]. [address] [place] [address]. All rights reserved. This information is not intended as a substitute for professional medical care. Always follow your healthcare professional's instructions.

## 2021-06-28 NOTE — PROGRESS NOTES
Progress Notes by Radha Menezes CNP at 11/18/2019  7:30 AM     Author: Radha Menezes CNP Service: -- Author Type: Nurse Practitioner    Filed: 11/18/2019  8:50 AM Encounter Date: 11/18/2019 Status: Signed    : Radha Menezes CNP (Nurse Practitioner)       Chief Complaint   Patient presents with   ? Injury     fell on Rt arm (from elbow to hand) this past weekend, swollowing and bruising, hands swollen and painful       ASSESSMENT & PLAN:   Diagnoses and all orders for this visit:    Effusion of right elbow    Injury of right upper extremity, initial encounter  -     Slings  -     XR Wrist Right 3 or More VWS  -     XR Elbow Right 3 or More VWS  -     XR Hand Right 3 or More VWS  -     ibuprofen tablet 600 mg (ADVIL,MOTRIN)    Abrasion of right upper extremity, initial encounter        MDM:  X-rays are negative for fracture with effusion seen on right elbow x-ray.  Patient states sling does help with comfort.  Advised to use for a few days, but to take her arm out occasionally and try to straighten as much as possible.  Ice, elevation, rest arm, ibuprofen.  Work note given.    Advised to watch for infection on abrasions and instructed how to clean abrasions.    Supportive care discussed.  See discharge instructions below for specific recommendations given.    At the end of the encounter, I discussed results, diagnosis, medications. Discussed red flags for immediate return to clinic/ER, as well as indications for follow up if no improvement. Patient and/or caregiver understood and agreed to plan. Patient was stable for discharge.    SUBJECTIVE    HPI:  HPI  Katya Purdy presents to the walk-in clinic with fall onto rt arm with pain, swelling, abrasions, ecchymosis and decreased ROM.  States was helping cousin move, was carrying boxes, slipped on gravel and fell mostly onto rt elbow.  Is having pain with severely decreased ROM at rt elbow, deformity and pain rt wrist and pain dorsal aspect of hand  around 1st metacarpal with decreased ROM of 1st finger.    Works as an  at [employer].  Does not do heavy lifting at work.    Hx rt hand/fingers gout with recurrence in late September.  This was preceded by a fall in late August requiring hospital admission.      Symptoms started: happened 2-1/2 days ago    Denies: N/T.     Known exposures: last td was     See ROS for additional symptoms and/or pertinent negatives.       History obtained from the patient.    Past Medical History:   Diagnosis Date   ? Arthritis     gout   ? Gout    ? PDA (patent ductus arteriosus)        Active Ambulatory (Non-Hospital) Problems    Diagnosis   ? Hemarthrosis   ? S/P arthrocentesis   ? Acute gouty arthritis   ? Tophaceous gout   ? Healthcare maintenance   ? Acute monoarthritis   ? Gout   ? Tobacco use disorder       No family history on file.    Social History     Tobacco Use   ? Smoking status: Former Smoker     Types: Cigarettes     Last attempt to quit: 2018     Years since quittin.9   ? Smokeless tobacco: Never Used   ? Tobacco comment: 5 cigarettes of marlboro reds per day   Substance Use Topics   ? Alcohol use: Not Currently       Review of Systems   All other systems reviewed and are negative.      OBJECTIVE    Vitals:    19 0733   BP: 131/87   Pulse: 100   Resp: 12   Temp: 98.7  F (37.1  C)   TempSrc: Oral   SpO2: 96%   Weight: 178 lb 4.8 oz (80.9 kg)       Physical Exam  Constitutional:       Appearance: He is well-developed.   HENT:      Head: Atraumatic.   Eyes:      Conjunctiva/sclera: Conjunctivae normal.   Cardiovascular:      Pulses: Normal pulses.   Pulmonary:      Effort: Pulmonary effort is normal.   Musculoskeletal:         General: Swelling (rt wrist, elbow, dorsal hand.  Tophi noted on affected hand. ), tenderness (rt wrist, rt 1st metacarpal ), deformity and signs of injury present.      Comments: Elbow at 90 degrees, minimal ability to extend.  Markedly reduced  with  minimal ROM of 1st finger.     Skin:     General: Skin is warm and dry.      Capillary Refill: Capillary refill takes less than 2 seconds.   Neurological:      Mental Status: He is alert and oriented to person, place, and time.      Sensory: No sensory deficit.      Comments: No head trauma   Psychiatric:         Behavior: Behavior normal.         Thought Content: Thought content normal.         Judgment: Judgment normal.         Labs:  No results found for this or any previous visit (from the past 240 hour(s)).      Radiology:    Xr Elbow Right 3 Or More Vws    Result Date: 11/18/2019  EXAM DATE:         11/18/2019 EXAM: X-RAY ELBOW RIGHT, MINIMUM 3 VIEWS LOCATION: [facility] DATE/TIME: 11/18/2019 8:15 AM INDICATION: Right elbow pain. COMPARISON: 11/05/2018. IMPRESSION: Probable joint effusion. No visible fracture. Joint spaces are maintained without dislocation. Recommend short-term interval follow-up to assess for an occult fracture.     Xr Hand Right 3 Or More Vws    Result Date: 11/18/2019  EXAM DATE:         11/18/2019 EXAM: X-RAY HAND RIGHT, MINIMUM 3 VIEWS LOCATION: [facility] DATE/TIME: 11/18/2019 8:30 AM INDICATION: Right hand. Injury. COMPARISON: 09/10/2019 radiograph. IMPRESSION: Normal joint spaces and alignment. No fracture.       PATIENT INSTRUCTIONS:   Patient Instructions   No broken bones!  There is fluid in your elbow from the injury and this sometimes means there is a small fracture that we can't see, so if your elbow/wrist/hand isn't 100% normal in about 10 days, recheck in your clinic.      Wear your wrist splint for comfort for a few days.      Ice and elevate your arm/hand.    Recommend icing up to 1 time per hour for the next 2 days for 20 minutes at a time, but at least 6 times today and tomorrow.      Sling for comfort for the next few days.      Rest right arm.      Clean abrasions/scratches with soap and water and apply bacitracin and bandaids to  prevent infection.      Ibuprofen 600 mg every 6 hours as needed for pain.            Patient Education     Upper Extremity Contusion  You have a contusion (bruise) of an upper extremity (arm, wrist, hand, or fingers). Symptoms include pain, swelling, and skin discoloration. No bones are broken. This injury may take from a few days to a few weeks to heal.  During that time, the bruise may change from reddish in color, to purple-blue, to green-yellow, to yellow-brown.  Home care    Unless another medicine was prescribed, you can take acetaminophen, ibuprofen, or naproxen to control pain. (If you have chronic liver or kidney disease or ever had a stomach ulcer or gastrointestinal bleeding, talk with your doctor before using these medicines.)    Elevate the injured area to reduce pain and swelling. As much as possible, sit or lie down with the injured area raised about the level of your heart. This is especially important during the first 48 hours.    Ice the injured area to help reduce pain and swelling. Wrap a cold source (ice pack or ice cubes in a plastic bag) in a thin towel. Apply to the bruised area for 20 minutes every 1 to 2 hours the first day. Continue this 3 to 4 times a day until the pain and swelling goes away.    If a sling was provided, you may remove it to shower or bathe. To prevent joint stiffness, do not wear it for more than 1 week.  Follow-up care  Follow up with your healthcare provide, or as advised. Call if you are not improving within the next 1 to 2 weeks.  When to seek medical advice   Call your healthcare provider right away if any of these occur:    Increased pain or swelling    Hand or fingers become cold, blue, numb or tingly    Signs of infection: Warmth, drainage, or increased redness or pain around the injury    Inability to move the injured body part     Frequent bruising for unknown reasons  Date Last Reviewed: 2/1/2017 2000-2017 [employer]. [address] [facility],  [place] [address]. All rights reserved. This information is not intended as a substitute for professional medical care. Always follow your healthcare professional's instructions.

## 2021-06-28 NOTE — PROGRESS NOTES
Progress Notes by Chun Roy PA-C at 2020  8:20 AM     Author: Chun Roy PA-C Service: -- Author Type: Physician Assistant    Filed: 2020 11:29 AM Encounter Date: 2020 Status: Signed    : Chun Roy PA-C (Physician Assistant)       Subjective:      Patient ID: Katya Purdy is a 24 y.o. male.    Chief Complaint:    HPI     Katya Purdy is a 24 y.o. male who is a past medical history of gout who presents today complaining of 1 day cute onset of a gout flare with left and right ankle pain.  Patient states he has had this in the past and feels very similar.  At this time he has no constitutional symptoms to include fever chills night sweats or fatigue.      Past Medical History:   Diagnosis Date   ? Arthritis     gout   ? Gout    ? PDA (patent ductus arteriosus)        No past surgical history on file.    No family history on file.    Social History     Tobacco Use   ? Smoking status: Former Smoker     Types: Cigarettes     Last attempt to quit: 2018     Years since quittin.1   ? Smokeless tobacco: Never Used   ? Tobacco comment: 5 cigarettes of marlboro reds per day   Substance Use Topics   ? Alcohol use: Not Currently   ? Drug use: Never       Review of Systems  As above in HPI, otherwise balance of Review of Systems are negative.    Objective:     BP (!) 137/94   Pulse 85   Temp 98.1  F (36.7  C) (Oral)   Resp 12   SpO2 97%     Physical Exam  General: Patient is resting comfortably no acute distress is afebrile  HEENT: Head is normocephalic atraumatic   eyes are PERRL EOMI sclera anicteric   TMs are clear bilaterally  Throat is clear  No cervical lymphadenopathy present  LUNGS: Clear to auscultation bilaterally  HEART: Regular rate and rhythm  Skin: Without rash non-diaphoretic  Musculoskeletal: The bilateral ankles are with erythema and effusion.    Lab:  Declined by patient.  Assessment:     Procedures    The encounter diagnosis was Tophaceous gout.    Plan:     1.  Tophaceous gout  ketorolac injection 30 mg (TORADOL)    predniSONE (DELTASONE) 20 MG tablet    Ambulatory referral to Rheumatology       Patient is being empirically treated for gout based on his prior history.  He is given a ketorolac injection for pain relief in the office.  Referral to rheumatology for definitive evaluation and treatment.    Patient Instructions     I recommend that you are seen by your rheumatologist for evaluation of your tophaceous gout.    Do not use over-the-counter ibuprofen Advil Motrin or Aleve.  The Toradol injection will last for 24 hours.    Of Tylenol over-the-counter.  The Tylenol would not interact with the prednisone.    The prednisone as written.  Ibuprofen type products over-the-counter will interact with prednisone so do not take them.    Follow-up with your rheumatologist and your primary care provider for long-term control of your tophaceous gout.        Patient Education     Treating Gout Attacks     Raising the joint above the level of your heart can help reduce gout symptoms.     Gout is a disease that affects the joints. It is caused by excess uric acid in your blood that may lead to crystals forming in your joints. Left untreated, it can lead to painful foot and joint deformities and even kidney problems. But, by treating gout early, you can relieve pain and help prevent future problems. Gout can usually be treated with medicine and proper diet. In severe cases, surgery may be needed.  Gout attacks are painful and often happen more than once. Taking medicines may reduce pain and prevent attacks in the future. There are also some things you can do at home to relieve symptoms.  Medicines for gout  Your healthcare provider may prescribe a daily medicine to reduce levels of uric acid. Reducing your uric acid levels may help prevent gout attacks. Allopurinol is one commonly used medicine taken daily to reduce uric acid levels. Other daily medicines used to reduce uric acid  levels include febuxostat, lesinurad, and probencid. Other medicines can help relieve pain and swelling during an acute attack. Medicines such as NSAIDs (nonsteroidal anti-inflammatory medicines), steroids, and colchicine may be prescribed for intermittent use to relieve an acute gout attack. Be sure to take your medicine as directed.  What you can do  Below are some things you can do at home to relieve gout symptoms. Your healthcare provider may have other tips.    Rest the painful joint as much as you can.    Raise the painful joint so it is at a level higher than your heart.    Use ice for 10 minutes every 1 to 2 hours as possible.  How can I prevent gout?  With a little effort, you may be able to prevent gout attacks in the future. Here are some things you can do:    Don't eat foods high in purines  ? Certain meats (red meat, processed meat, turkey)  ? Organ meats (kidney, liver, sweetbread)  ? Shellfish (lobster, crab, shrimp, scallop, mussel)  ? Certain fish (anchovy, sardine, herring, mackerel)    Take any medicines prescribed by your healthcare provider.    Lose weight if you need to.    Reduce high fructose corn syrup in meals and drinks.    Reduce or cut out alcohol, particularly beer, but also red wine and spirits.    Control blood pressure, diabetes, and cholesterol.    Drink plenty of water to help flush uric acid from your body.  Date Last Reviewed: 4/1/2018 2000-2019 [employer]. [address] [place] [address]. All rights reserved. This information is not intended as a substitute for professional medical care. Always follow your healthcare professional's instructions.           Patient Education     Gout Diet  Gout is a painful condition caused by an excess of uric acid, a waste product made by the body. Uric acid forms crystals that collect in the joints. The immune response to these crystals brings on symptoms of joint pain and swelling. This is called a gout attack.  Often, medications and diet changes are combined to manage gout. Below are some guidelines for changing your diet to help you manage gout and prevent attacks. Your health care provider will help you determine the best eating plan for you.     Eating to manage gout  Weight loss for those who are overweight may help reduce gout attacks.  Eat less of these foods  Eating too many foods containing purines may raise the levels of uric acid in your body. This raises your risk for a gout attack. Try to limit these foods and drinks:    Alcohol, such as beer and red wine. You may be told to avoid alcohol completely.    Soft drinks that contain sugar or high fructose corn syrup    Certain fish, including anchovies, sardines, fish eggs, and herring    Shellfish    Certain meats, such as red meat, hot dogs, luncheon meats, and turkey    Organ meats, such as liver, kidneys, and sweetbreads    Legumes, such as dried beans and peas    Other high fat foods such as gravy, whole milk, and high fat cheeses    Vegetables such as asparagus, cauliflower, spinach, and mushrooms used to be thought to contribute to an increased risk for a gout attack, but recent studies show that high purine vegetables don't increase the risk for a gout attack.  Eat more of these foods  Other foods may be helpful for people with gout. Add some of these foods to your diet:    Cherries contain chemicals that may lower uric acid.    Omega fatty acids. These are found in some fatty fish such as salmon, certain oils (flax, olive, or nut), and nuts themselves. Omega fatty acids may help prevent inflammation due to gout.    Dairy products that are low-fat or fat-free, such as cheese and yogurt    Complex carbohydrate foods, including whole grains, brown rice, oats, and beans    Coffee, in moderation    Water, approximately 64 ounces per day  Follow-up care  Follow up with your healthcare provider as advised.  When to seek medical advice  Call your healthcare  provider right away if any of these occur:    Return of gout symptoms, usually at night:    Severe pain, swelling, and heat in a joint, especially the base of the big toe    Affected joint is hard to move    Skin of the affected joint is purple or red    Fever of 100.4 F (38 C) or higher    Pain that doesn't get better even with prescribed medicine   Date Last Reviewed: 1/12/2016 2000-2017 [employer]. [address] [place] [address], [place] [address]. All rights reserved. This information is not intended as a substitute for professional medical care. Always follow your healthcare professional's instructions.           Patient Education     Eating to Prevent Gout  Gout is a painful form of arthritis caused by an excess of uric acid. This is a waste product made by the body. It builds up in the body and forms crystals that collect in the joints, causing a gout attack. Alcohol and certain foods can trigger a gout attack. Below are some guidelines for changing your diet to help you manage gout. Your healthcare provider can work with you to determine the best eating plan for you. Know that diet is only one part of managing gout. Take your medicines as prescribed and follow the other guidelines your healthcare provider has given you.  Foods to limit  Eating too many foods containing purines may increase the levels of uric acid in your body and increase your risk for a gout attack. It may be best to limit these high-purine foods:    Alcohol (beer and red wine). You may be told to avoid alcohol completely.    Certain fish (anchovies, sardines, fish roes, herring, tuna, mussels, codfish, scallops, trout, and katarzyna)    Certain meats (red meat, processed meat, noble, turkey, wild game, and goose)    Sauces and gravies made with meat    Organ meats (such as liver, kidneys, sweetbreads, and tripe)    Legumes (such as dried beans and peas)    Mushrooms, spinach, asparagus, and cauliflower    Yeast and yeast extract  supplements  Foods to try  Some foods may be helpful for people with gout. You may want to try adding some of the following foods to your diet:    Dark berries. These include blueberries, blackberries, and cherries. These berries contain chemicals that may lower uric acid.    Tofu. Tofu, which is made from soy, is a good source of protein. Studies have shown that it may be a better choice than meat for people with gout.    Omega fatty acids. These acids are found in fatty fish (such as salmon), certain oils (such as flax, olive, or nut oils), or nuts. They may help prevent inflammation due to gout.  The following guidelines are recommended by the American Medical Association for people with gout. Your diet should be:    High in fiber, whole grains, fruits, and vegetables.    Low in protein (15% of calories should come from protein. Choose lean sources, such as soy, lean meats, and poultry).    Low in fat (no more than 30% of calories should come from fat, with only 10% coming from animal, or saturated, fat).  Date Last Reviewed: 11/1/2017 2000-2019 [employer]. [address] [place] [address]. All rights reserved. This information is not intended as a substitute for professional medical care. Always follow your healthcare professional's instructions.           Patient Education     What Is Gout?  Gout is a disease that affects the joints. Left untreated, it can lead to painful foot and joint deformities and even kidney problems. But, by treating gout early, you can relieve pain and help prevent future problems. Gout can usually be treated with medicine and proper diet. In severe cases, surgery may be needed.  What causes gout?  Gout is caused by an excess of uric acid (a waste product made by the body). Uric acid is excreted by the kidneys. If the uric acid level in your blood rises too high, the uric acid may form crystals that collect in the joints, bringing on a gout attack. If you have  "many gout attacks, crystals may form large deposits called tophi. Tophi can damage joints and cause deformity.  Who is at risk for gout?  Men are more likely to have gout than women. But women can also be affected, mostly after menopause. Some health problems, such as obesity and high cholesterol, make gout more likely. And some medicines, such as diuretics (water pills), can trigger a gout attack. People who drink a lot of alcohol are at high risk for gout. Certain foods can also trigger a gout attack.  Substances that may trigger a gout attack  To help prevent a gout attack, avoid these foods:    Alcohol (particularly beer, but also red wine and spirits)    Certain meats (red meat, processed meat, turkey)    Organ meats (kidney, liver, sweetbread)    Shellfish (lobster, crab, shrimp, scallop, mussel)    Certain fish (anchovy, sardine, herring, mackerel)   Treatment    Lifestyle changes, including weight loss, exercise, and quitting tobacco use    Reducing consumption of the food groups above as well as high fructose corn syrup, found in many foods including sodas and energy drinks    Changing non-essential medicines that may contribute to gout (such as thiazide diuretics--\"water pills\")    Medicines to reduce the amount of uric acid in the blood, such as allopurinol, probencid, febuxostat, and lesinurad.    Medicines to treat acute gout attacks, including NSAIDs (nonsteroidal anti-inflammatory medicines), steroids, and colchicine    Date Last Reviewed: 4/1/2018 2000-2019 [employer]. [address] [place] [address], [place] [address]. All rights reserved. This information is not intended as a substitute for professional medical care. Always follow your healthcare professional's instructions.                       "

## 2021-06-28 NOTE — PROGRESS NOTES
Progress Notes by Chun Roy PA-C at 2020  7:30 AM     Author: Chun Roy PA-C Service: -- Author Type: Physician Assistant    Filed: 2020  8:10 AM Encounter Date: 2020 Status: Signed    : Chun Roy PA-C (Physician Assistant)       Subjective:      Patient ID: Katya Purdy is a 24 y.o. male.    Chief Complaint:    HPI     Katya Purdy is a 24 y.o. male a known history of tophaceous gout who presents today for a gout flare into his right wrist.  She did have an appointment with myself in the urgent care recently on 2020.  Patient was referred to rheumatology for a referral and evaluation of his tophaceous gout was.  Patient was unable to make that appointment.    Patient is here ostensibly to get treatment for his tophaceous gout flare.  He has had chronic gout and has had involvement of the third MCP joint that readily has tophi that is seen.  However, in this episode patient is having difficulty with the right wrist.  The wrist is painful and swollen especially with movement.    At this time, patient is declining need for testing.  Since it is obvious that he has chronic gout we will empirically treat him in the office today.      Past Medical History:   Diagnosis Date   ? Arthritis     gout   ? Gout    ? PDA (patent ductus arteriosus)        No past surgical history on file.    No family history on file.    Social History     Tobacco Use   ? Smoking status: Former Smoker     Types: Cigarettes     Last attempt to quit: 2018     Years since quittin.2   ? Smokeless tobacco: Never Used   ? Tobacco comment: 5 cigarettes of marlboro reds per day   Substance Use Topics   ? Alcohol use: Not Currently   ? Drug use: Never       Review of Systems  As above in HPI, otherwise balance of Review of Systems are negative.    Objective:     BP (!) 147/94   Pulse 78   Temp 98.3  F (36.8  C) (Oral)   Resp 12   Wt 167 lb 6.4 oz (75.9 kg)   SpO2 99%   BMI 26.22 kg/m      Physical  Exam  General: Patient is resting comfortably and in some distress is afebrile  HEENT: Head is normocephalic atraumatic   eyes are PERRL EOMI sclera anicteric   Skin: Without rash non-diaphoretic  Musculoskeletal: Examination of the right hand shows that he has a tophaceous gout formation on the third MCP joint also he has pain and swelling at the right wrist.  It is not warm or erythematous.      Assessment:     Procedures    The primary encounter diagnosis was Chronic gout of multiple sites, unspecified cause. A diagnosis of Tophaceous gout was also pertinent to this visit.    Plan:     1. Chronic gout of multiple sites, unspecified cause  ketorolac injection 30 mg (TORADOL)    predniSONE (DELTASONE) 10 mg tablet   2. Tophaceous gout  predniSONE (DELTASONE) 10 mg tablet     Advised patient that he has a chronic condition and that I am concerned that this may be involving some damage to his joints.  Because of the chronic nature of his symptoms patient will need to have evaluation with rheumatology to avoid damage.    The patient declined to have x-ray or lab.  It is been apparent that he has had got chronic tophaceous gout.  I do think he has an acute attack on the right wrist of his chronic problem.  Because the patient has had chronic gout I will do a longer taper of 12 days.  He again is referred back to his rheumatologist for definitive evaluation and treatment.  We did discuss long-term consequences of not treating the tophaceous gout up to and including joint destruction, tendon rupture and infection.  Questions were answered to patient satisfaction before discharge.    Patient Instructions     You have a condition called tophaceous gout.  In the long run this may destroy tendons and joints and bone.  I highly encourage you to follow-up with rheumatology to look for a long-term solution.    In the interim, you will be given Toradol very strong anti-inflammatory dose for pain relief and treatment.  Please do  not take any other over-the-counter NSAID today.    You will be started on prednisone.  Finish the short course as written again do not take NSAIDs while on the prednisone.    Return to your primary care provider or rheumatology if you are having any signs or symptoms of infection or other complication.      Patient Education     Treating Gout Attacks     Raising the joint above the level of your heart can help reduce gout symptoms.     Gout is a disease that affects the joints. It is caused by excess uric acid in your blood that may lead to crystals forming in your joints. Left untreated, it can lead to painful foot and joint deformities and even kidney problems. But, by treating gout early, you can relieve pain and help prevent future problems. Gout can usually be treated with medicine and proper diet. In severe cases, surgery may be needed.  Gout attacks are painful and often happen more than once. Taking medicines may reduce pain and prevent attacks in the future. There are also some things you can do at home to relieve symptoms.  Medicines for gout  Your healthcare provider may prescribe a daily medicine to reduce levels of uric acid. Reducing your uric acid levels may help prevent gout attacks. Allopurinol is one commonly used medicine taken daily to reduce uric acid levels. Other daily medicines used to reduce uric acid levels include febuxostat, lesinurad, and probencid. Other medicines can help relieve pain and swelling during an acute attack. Medicines such as NSAIDs (nonsteroidal anti-inflammatory medicines), steroids, and colchicine may be prescribed for intermittent use to relieve an acute gout attack. Be sure to take your medicine as directed.  What you can do  Below are some things you can do at home to relieve gout symptoms. Your healthcare provider may have other tips.    Rest the painful joint as much as you can.    Raise the painful joint so it is at a level higher than your heart.    Use ice for  10 minutes every 1 to 2 hours as possible.  How can I prevent gout?  With a little effort, you may be able to prevent gout attacks in the future. Here are some things you can do:    Don't eat foods high in purines  ? Certain meats (red meat, processed meat, turkey)  ? Organ meats (kidney, liver, sweetbread)  ? Shellfish (lobster, crab, shrimp, scallop, mussel)  ? Certain fish (anchovy, sardine, herring, mackerel)    Take any medicines prescribed by your healthcare provider.    Lose weight if you need to.    Reduce high fructose corn syrup in meals and drinks.    Reduce or cut out alcohol, particularly beer, but also red wine and spirits.    Control blood pressure, diabetes, and cholesterol.    Drink plenty of water to help flush uric acid from your body.  Date Last Reviewed: 4/1/2018 2000-2019 [employer]. [address] [place] [address]. All rights reserved. This information is not intended as a substitute for professional medical care. Always follow your healthcare professional's instructions.           Patient Education     Gout Diet  Gout is a painful condition caused by an excess of uric acid, a waste product made by the body. Uric acid forms crystals that collect in the joints. The immune response to these crystals brings on symptoms of joint pain and swelling. This is called a gout attack. Often, medications and diet changes are combined to manage gout. Below are some guidelines for changing your diet to help you manage gout and prevent attacks. Your health care provider will help you determine the best eating plan for you.     Eating to manage gout  Weight loss for those who are overweight may help reduce gout attacks.  Eat less of these foods  Eating too many foods containing purines may raise the levels of uric acid in your body. This raises your risk for a gout attack. Try to limit these foods and drinks:    Alcohol, such as beer and red wine. You may be told to avoid alcohol  completely.    Soft drinks that contain sugar or high fructose corn syrup    Certain fish, including anchovies, sardines, fish eggs, and herring    Shellfish    Certain meats, such as red meat, hot dogs, luncheon meats, and turkey    Organ meats, such as liver, kidneys, and sweetbreads    Legumes, such as dried beans and peas    Other high fat foods such as gravy, whole milk, and high fat cheeses    Vegetables such as asparagus, cauliflower, spinach, and mushrooms used to be thought to contribute to an increased risk for a gout attack, but recent studies show that high purine vegetables don't increase the risk for a gout attack.  Eat more of these foods  Other foods may be helpful for people with gout. Add some of these foods to your diet:    Cherries contain chemicals that may lower uric acid.    Omega fatty acids. These are found in some fatty fish such as salmon, certain oils (flax, olive, or nut), and nuts themselves. Omega fatty acids may help prevent inflammation due to gout.    Dairy products that are low-fat or fat-free, such as cheese and yogurt    Complex carbohydrate foods, including whole grains, brown rice, oats, and beans    Coffee, in moderation    Water, approximately 64 ounces per day  Follow-up care  Follow up with your healthcare provider as advised.  When to seek medical advice  Call your healthcare provider right away if any of these occur:    Return of gout symptoms, usually at night:    Severe pain, swelling, and heat in a joint, especially the base of the big toe    Affected joint is hard to move    Skin of the affected joint is purple or red    Fever of 100.4 F (38 C) or higher    Pain that doesn't get better even with prescribed medicine   Date Last Reviewed: 1/12/2016 2000-2017 [employer]. [address] [place] [address], [place] [address]. All rights reserved. This information is not intended as a substitute for professional medical care. Always follow your healthcare  professional's instructions.           Patient Education     Eating to Prevent Gout  Gout is a painful form of arthritis caused by an excess of uric acid. This is a waste product made by the body. It builds up in the body and forms crystals that collect in the joints, causing a gout attack. Alcohol and certain foods can trigger a gout attack. Below are some guidelines for changing your diet to help you manage gout. Your healthcare provider can work with you to determine the best eating plan for you. Know that diet is only one part of managing gout. Take your medicines as prescribed and follow the other guidelines your healthcare provider has given you.  Foods to limit  Eating too many foods containing purines may increase the levels of uric acid in your body and increase your risk for a gout attack. It may be best to limit these high-purine foods:    Alcohol (beer and red wine). You may be told to avoid alcohol completely.    Certain fish (anchovies, sardines, fish roes, herring, tuna, mussels, codfish, scallops, trout, and katarzyna)    Certain meats (red meat, processed meat, noble, turkey, wild game, and goose)    Sauces and gravies made with meat    Organ meats (such as liver, kidneys, sweetbreads, and tripe)    Legumes (such as dried beans and peas)    Mushrooms, spinach, asparagus, and cauliflower    Yeast and yeast extract supplements  Foods to try  Some foods may be helpful for people with gout. You may want to try adding some of the following foods to your diet:    Dark berries. These include blueberries, blackberries, and cherries. These berries contain chemicals that may lower uric acid.    Tofu. Tofu, which is made from soy, is a good source of protein. Studies have shown that it may be a better choice than meat for people with gout.    Omega fatty acids. These acids are found in fatty fish (such as salmon), certain oils (such as flax, olive, or nut oils), or nuts. They may help prevent inflammation due to  gout.  The following guidelines are recommended by the American Medical Association for people with gout. Your diet should be:    High in fiber, whole grains, fruits, and vegetables.    Low in protein (15% of calories should come from protein. Choose lean sources, such as soy, lean meats, and poultry).    Low in fat (no more than 30% of calories should come from fat, with only 10% coming from animal, or saturated, fat).  Date Last Reviewed: 11/1/2017 2000-2019 [employer]. [address] [place] [address]. All rights reserved. This information is not intended as a substitute for professional medical care. Always follow your healthcare professional's instructions.

## 2021-06-28 NOTE — PROGRESS NOTES
"Progress Notes by Julian Nevarez DO at 12/4/2019  5:00 PM     Author: Julian Nevarez DO Service: -- Author Type: Physician    Filed: 12/5/2019 11:04 AM Encounter Date: 12/4/2019 Status: Signed    : Julian Nevarez DO (Physician)       Chief Complaint   Patient presents with   ? Edema     right hand swollen, recent ED visit was told it was gout, swelling not getting better, painful :I just need a shot of toradol and some prednisone today\"        History of Present Illness: Rooming staff notes reviewed.  Patient has a chief concern of right hand discomfort related to recurrent gout.  Patient last took ibuprofen about 2 days ago, with no significant relief.  He was managed in the hospital for the same condition in November of this year.  Patient trended better with oral prednisone treatment after last ER visit, with the symptoms worsening again off of prednisone.  The prednisone treatment consisted of 40 mg daily for 5 days.  He is currently taking allopurinol to help with prevention of gout symptoms. No recent fever or chills.  He has an appointment with his rheumatologist next month.    Review of systems: See history of present illness, all others negative.     Current Outpatient Medications   Medication Sig Dispense Refill   ? allopurinol (ZYLOPRIM) 300 MG tablet Take 1 tablet (300 mg total) by mouth daily. 30 tablet 0   ? HYDROcodone-acetaminophen 5-325 mg per tablet Take 1-2 tablets by mouth every 6 (six) hours as needed for pain. 4 tablet 0   ? predniSONE (DELTASONE) 20 MG tablet Take 60 mg by mouth daily for 3 days, THEN 40 mg daily for 3 days, THEN 20 mg daily for 3 days, THEN 10 mg daily for 4 days. 20 tablet 0     No current facility-administered medications for this visit.      Past Medical History:   Diagnosis Date   ? Arthritis     gout   ? Gout    ? PDA (patent ductus arteriosus)       No past surgical history on file.   Social History     Tobacco Use   ? Smoking status: Former Smoker     Types: " Cigarettes     Last attempt to quit: 2018     Years since quittin.0   ? Smokeless tobacco: Never Used   ? Tobacco comment: 5 cigarettes of marlboro reds per day   Substance Use Topics   ? Alcohol use: Not Currently   ? Drug use: Never        No family history on file.    Vitals:    19 1705   BP: (!) 144/91   Pulse: 73   Resp: 16   Temp: 98.2  F (36.8  C)   TempSrc: Oral   SpO2: 100%   Weight: 174 lb 4.8 oz (79.1 kg)     Wt Readings from Last 3 Encounters:   19 174 lb 4.8 oz (79.1 kg)   19 170 lb (77.1 kg)   19 178 lb (80.7 kg)     Temp Readings from Last 3 Encounters:   19 98.2  F (36.8  C) (Oral)   19 97.6  F (36.4  C) (Oral)   19 99.1  F (37.3  C) (Oral)     BP Readings from Last 3 Encounters:   19 (!) 144/91   19 144/80   19 138/82     Pulse Readings from Last 3 Encounters:   19 73   19 65   19 (!) 108       EXAM:   General: Vital signs reviewed. Patient is in some distress due to right hand discomfort. Breathing is non labored appearing. Patient is alert and oriented x 3.     See picture for description also in electronic medical record.  Patient has increased skin temperature throughout the area of his second, third, and fourth MCP joints with mild erythema throughout this area being more pronounced locally over the third MCP joint area.  He has moderate soft tissue edema of hand noted on dorsal side in the area of increased skin temperature.        Assessment/Plan   1. Acute idiopathic gout of right hand  predniSONE (DELTASONE) 20 MG tablet    ketorolac injection 30 mg (TORADOL)    HYDROcodone-acetaminophen 5-325 mg per tablet       Patient Instructions     See hand out for treatment advice. You can take your next dose of ibuprofen 6 hours after discharge. Caution that the hydrocodone may cause drowsiness and constipation, and can lead to psychological addiction in some people. There is also acetaminophen in this medication,  so do not take other products containing this when using the prescription. Caution that the hydrocodone may cause drowsiness and constipation, and can lead to psychological addiction in some people. There is also acetaminophen in this medication, so do not take other products containing this when using the prescription.        Patient Education     Gout    Gout is an inflammation of a joint due to a build-up of gout crystals in the joint fluid. This occurs when there is an excess of uric acid (a normal waste product) in the body. Uric acid builds up in the body when the kidneys are unable to filter enough of it from the blood. This may occur with age. It is also associated with kidney disease. Gout occurs more often in people with obesity, diabetes, high blood pressure, or high levels of fats in the blood. It may run in families. Gout tends to come and go. A flare up of gout is called an attack. Drinking alcohol or eating certain foods (such as shellfish or foods with additives such as high-fructose corn syrup) may increase uric acid levels in the blood and cause a gout attack.  During a gout attack, the affected joint may become a hot, red, swollen and painful. If you have had one attack of gout, you are likely to have another. An attack of gout can be treated with medicine. If these attacks become frequent, a daily medicine may be prescribed to help the kidneys remove uric acid from the body.  Home care  During a gout attack:    Rest painful joints. If gout affects the joints of your foot or leg, you may want to use crutches for the first few days to keep from bearing weight on the affected joint.    When sitting or lying down, raise the painful joint to a level higher than your heart.    Apply an ice pack (ice cubes in a plastic bag wrapped in a thin towel) over the injured area for 20 minutes every 1 to 2 hours the first day for pain relief. Continue this 3 to 4 times a day for swelling and pain.    Avoid  alcohol and foods listed below (see Preventing attacks) during a gout attack. Drink extra fluid to help flush the uric acid through your kidneys.    If you were prescribed a medicine to treat gout, take it as your healthcare provider has instructed. Don't skip doses.    Take anti-inflammatory medicine as directed.     If pain medicines have been prescribed, take them exactly as directed.    Preventing attacks    Minimize or avoid alcohol use. Excess alcohol intake can cause a gout attack.    Limit these foods and beverages:  ? Organ meats, such as kidneys and liver  ? Certain seafoods (anchovies, sardines, shrimp, scallops, herring, mackerel)  ? Wild game, meat extracts and meat gravies  ? Foods and beverages sweetened with high-fructose corn syrup, such as sodas    Eat a healthy diet including low-fat and nonfat dairy, whole grains, and vegetables.    If you are overweight, talk to your healthcare provider about a weight reduction plan. Avoid fasting or extreme low calorie diets (less than 900 calories per day). This will increase uric acid levels in the body.    If you have diabetes or high blood pressure, work with your doctor to manage these conditions.    Protect the joint from injury. Trauma can trigger a gout attack.  Follow-up care  Follow up with your healthcare provider, or as advised.   When to seek medical advice  Call your healthcare provider if you have any of the following:    Fever over 100.4 F (38. C) with worsening joint pain    Increasing redness around the joint    Pain developing in another joint    Repeated vomiting, abdominal pain, or blood in the vomit or stool (black or red color)  Date Last Reviewed: 3/1/2017    1155-2572 [employer]. [address] [place] [address]. All rights reserved. This information is not intended as a substitute for professional medical care. Always follow your healthcare professional's instructions.           Patient Education     Treating  Gout Attacks     Raising the joint above the level of your heart can help reduce gout symptoms.     Gout is a disease that affects the joints. It is caused by excess uric acid in your blood that may lead to crystals forming in your joints. Left untreated, it can lead to painful foot and joint deformities and even kidney problems. But, by treating gout early, you can relieve pain and help prevent future problems. Gout can usually be treated with medicine and proper diet. In severe cases, surgery may be needed.  Gout attacks are painful and often happen more than once. Taking medicines may reduce pain and prevent attacks in the future. There are also some things you can do at home to relieve symptoms.  Medicines for gout  Your healthcare provider may prescribe a daily medicine to reduce levels of uric acid. Reducing your uric acid levels may help prevent gout attacks. Allopurinol is one commonly used medicine taken daily to reduce uric acid levels. Other daily medicines used to reduce uric acid levels include febuxostat, lesinurad, and probencid. Other medicines can help relieve pain and swelling during an acute attack. Medicines such as NSAIDs (nonsteroidal anti-inflammatory medicines), steroids, and colchicine may be prescribed for intermittent use to relieve an acute gout attack. Be sure to take your medicine as directed.  What you can do  Below are some things you can do at home to relieve gout symptoms. Your healthcare provider may have other tips.    Rest the painful joint as much as you can.    Raise the painful joint so it is at a level higher than your heart.    Use ice for 10 minutes every 1 to 2 hours as possible.  How can I prevent gout?  With a little effort, you may be able to prevent gout attacks in the future. Here are some things you can do:    Don't eat foods high in purines  ? Certain meats (red meat, processed meat, turkey)  ? Organ meats (kidney, liver, sweetbread)  ? Shellfish (lobster, crab,  shrimp, scallop, mussel)  ? Certain fish (anchovy, sardine, herring, mackerel)    Take any medicines prescribed by your healthcare provider.    Lose weight if you need to.    Reduce high fructose corn syrup in meals and drinks.    Reduce or cut out alcohol, particularly beer, but also red wine and spirits.    Control blood pressure, diabetes, and cholesterol.    Drink plenty of water to help flush uric acid from your body.  Date Last Reviewed: 4/1/2018 2000-2019 [employer]. [address] [place] [address]. All rights reserved. This information is not intended as a substitute for professional medical care. Always follow your healthcare professional's instructions.              Julian Nevarez, DO

## 2021-06-29 ENCOUNTER — RECORDS - HEALTHEAST (OUTPATIENT)
Dept: RHEUMATOLOGY | Facility: CLINIC | Age: 26
End: 2021-06-29

## 2021-06-30 ENCOUNTER — RECORDS - HEALTHEAST (OUTPATIENT)
Dept: RHEUMATOLOGY | Facility: CLINIC | Age: 26
End: 2021-06-30

## 2021-06-30 ENCOUNTER — TELEPHONE (OUTPATIENT)
Dept: RHEUMATOLOGY | Facility: CLINIC | Age: 26
End: 2021-06-30

## 2021-06-30 DIAGNOSIS — M1A.9XX1 TOPHACEOUS GOUT: ICD-10-CM

## 2021-06-30 RX ORDER — ALLOPURINOL 300 MG/1
TABLET ORAL
Qty: 180 TABLET | Refills: 0 | Status: SHIPPED | OUTPATIENT
Start: 2021-06-30 | End: 2023-02-09

## 2021-07-03 NOTE — ADDENDUM NOTE
Addendum Note by Evens Buenrostro MD at 6/22/2018 11:11 AM     Author: Evens Buenrostro MD Service: -- Author Type: Physician    Filed: 6/22/2018 11:11 AM Encounter Date: 6/21/2018 Status: Signed    : Evens Buenrostro MD (Physician)    Addended by: EVENS BUENROSTRO on: 6/22/2018 11:11 AM        Modules accepted: Orders

## 2021-07-03 NOTE — ADDENDUM NOTE
Addendum Note by Evens Buenrostro MD at 12/13/2018 11:00 AM     Author: Evens Buenrostro MD Service: -- Author Type: Physician    Filed: 12/13/2018  1:23 PM Encounter Date: 12/13/2018 Status: Signed    : Evens Buenrostro MD (Physician)    Addended by: EVENS BUENROSTRO on: 12/13/2018 01:23 PM        Modules accepted: Orders

## 2021-07-04 NOTE — ADDENDUM NOTE
Addendum Note by Lauren Molina RN at 4/28/2021  4:35 PM     Author: Lauren Molina RN Service: -- Author Type: Registered Nurse    Filed: 4/28/2021  4:35 PM Encounter Date: 4/26/2021 Status: Signed    : Lauren Molina RN (Registered Nurse)    Addended by: LAUREN MOLINA on: 4/28/2021 04:35 PM        Modules accepted: Orders

## 2021-07-04 NOTE — TELEPHONE ENCOUNTER
Telephone Encounter by Lauren Parmar RN at 6/30/2021  4:49 PM     Author: Lauren Parmar RN Service: -- Author Type: Registered Nurse    Filed: 6/30/2021  4:49 PM Encounter Date: 6/30/2021 Status: Signed    : Lauren Parmar RN (Registered Nurse)       Refilled per Rheum RN refill protocol  prednisone was to be discontinued after completing last rx.

## 2021-07-04 NOTE — ADDENDUM NOTE
Addendum Note by Misbah Marinelli DO at 4/29/2021  1:45 PM     Author: Misbah Marinelli DO Service: -- Author Type: Physician    Filed: 4/29/2021  1:45 PM Encounter Date: 4/26/2021 Status: Signed    : Misbah Marinelli DO (Physician)    Addended by: MISBAH MARINELLI on: 4/29/2021 01:45 PM        Modules accepted: Orders

## 2021-07-04 NOTE — TELEPHONE ENCOUNTER
Telephone Encounter by Tory Soriano at 6/30/2021  3:27 PM     Author: Tory Soriano Service: -- Author Type: --    Filed: 6/30/2021  3:28 PM Encounter Date: 6/30/2021 Status: Signed    : Tory Soriano       Please send refill of allopurinol to  [facility] pharmacy, as patient has misplaced medication.    [facility] [address] [place] [address]    Fax: [phone]  Phone: [phone]

## 2021-08-02 ENCOUNTER — HOSPITAL ENCOUNTER (EMERGENCY)
Facility: HOSPITAL | Age: 26
Discharge: HOME | End: 2021-08-02
Attending: EMERGENCY MEDICINE | Admitting: EMERGENCY MEDICINE

## 2021-08-02 VITALS
WEIGHT: 180 LBS | SYSTOLIC BLOOD PRESSURE: 147 MMHG | OXYGEN SATURATION: 97 % | BODY MASS INDEX: 28.19 KG/M2 | RESPIRATION RATE: 18 BRPM | HEART RATE: 89 BPM | DIASTOLIC BLOOD PRESSURE: 93 MMHG | TEMPERATURE: 98.4 F

## 2021-08-02 DIAGNOSIS — M10.9 ACUTE GOUT OF LEFT HAND, UNSPECIFIED CAUSE: ICD-10-CM

## 2021-08-02 PROCEDURE — 99282 EMERGENCY DEPT VISIT SF MDM: CPT

## 2021-08-02 PROCEDURE — 250N000011 HC RX IP 250 OP 636: Performed by: EMERGENCY MEDICINE

## 2021-08-02 PROCEDURE — 96372 THER/PROPH/DIAG INJ SC/IM: CPT | Performed by: EMERGENCY MEDICINE

## 2021-08-02 RX ORDER — PREDNISONE 20 MG/1
TABLET ORAL
Qty: 10 TABLET | Refills: 0 | Status: SHIPPED | OUTPATIENT
Start: 2021-08-02 | End: 2022-02-14

## 2021-08-02 RX ORDER — KETOROLAC TROMETHAMINE 10 MG/1
10 TABLET, FILM COATED ORAL EVERY 6 HOURS PRN
Qty: 10 TABLET | Refills: 0 | Status: SHIPPED | OUTPATIENT
Start: 2021-08-02 | End: 2022-02-14

## 2021-08-02 RX ORDER — KETOROLAC TROMETHAMINE 30 MG/ML
30 INJECTION, SOLUTION INTRAMUSCULAR; INTRAVENOUS ONCE
Status: COMPLETED | OUTPATIENT
Start: 2021-08-02 | End: 2021-08-02

## 2021-08-02 RX ADMIN — KETOROLAC TROMETHAMINE 30 MG: 30 INJECTION, SOLUTION INTRAMUSCULAR; INTRAVENOUS at 22:43

## 2021-08-03 NOTE — ED PROVIDER NOTES
EMERGENCY DEPARTMENT ENCOUNTER      NAME: Katya Purdy  AGE: 26 year old male  YOB: 1995  MRN: [id number]  EVALUATION DATE & TIME: No admission date for patient encounter.    PCP: Evens Buenrostro    ED PROVIDER: Dolores Tabares MD    Chief Complaint   Patient presents with     Hand Pain         FINAL IMPRESSION:  1. Acute gout of left hand, unspecified cause          ED COURSE & MEDICAL DECISION MAKING:    Pertinent Labs & Imaging studies reviewed. (See chart for details)  26 year old male with history of gout who presents to the Emergency Department for evaluation of pain and swelling in the left hand.  On exam he has mild erythema of the left fourth MCP with associated swelling extending into the hand.  This is most consistent with flare of gout.  He does not have any fevers chills systemic symptoms or increased risk factors for an acute septic arthritis.  No recent trauma to suspect fracture.  Patient has done well with burst of prednisone in the past.  Given dose of Toradol here, prednisone for home.  Warning signs return to ED discussed.       10:38 PM I met with the patient, obtained an initial history, performed an examination and discussed the plan. PPE worn throughout all interactions with the patient, including gloves, surgical mask, N95 mask, safety glasses, and surgical cap.     At the conclusion of the encounter I discussed the results of all of the tests and the disposition. The questions were answered. The patient or family acknowledged understanding and was agreeable with the care plan.    MEDICATIONS GIVEN IN THE EMERGENCY:  Medications   ketorolac (TORADOL) injection 30 mg (30 mg Intramuscular Given 8/2/21 0074)       NEW PRESCRIPTIONS STARTED AT TODAY'S ER VISIT  Discharge Medication List as of 8/2/2021 10:46 PM      START taking these medications    Details   ketorolac (TORADOL) 10 MG tablet Take 1 tablet (10 mg) by mouth every 6 hours as needed for moderate pain, Disp-10 tablet,  R-0, Local Print      !! predniSONE (DELTASONE) 20 MG tablet Take two tablets (= 40mg) each day for 5 (five) days, Disp-10 tablet, R-0, Local Print       !! - Potential duplicate medications found. Please discuss with provider.             =================================================================    HPI    Patient information was obtained from: Patient    Use of Intrepreter: N/A     Katya Purdy is a 26 year old male with pertinent medical history of tophaceous gout, tobacco use disorder, SIRS who presents to the ED for evaluation of hand pain.    The patient reports a history of gout and believed this is a flare up that he has had in the past. The patient is right handed and works with his hand on a daily basis. He reports pain in both hands with the left being much worse and he has lost the ability to move his left hand. He is otherwise in his usual state of health and denies any other concerns at this time.       REVIEW OF SYSTEMS  Constitutional:  Denies fever, chills, weight loss or weakness  Musculoskeletal:  Denies any new muscle/joint pain, swelling or loss of function. Positive for bilateral hand pain and swelling.   Skin:  Denies rash, pallor  Neurologic:  Denies headache, focal weakness or sensory changes  All other systems negative unless noted in HPI.      PAST MEDICAL HISTORY:  Past Medical History:   Diagnosis Date     Arthritis     gout     Gout      PDA (patent ductus arteriosus)        PAST SURGICAL HISTORY:  History reviewed. No pertinent surgical history.    CURRENT MEDICATIONS:    Prior to Admission Medications   Prescriptions Last Dose Informant Patient Reported? Taking?   HYDROcodone-acetaminophen 5-325 mg per tablet   No No   Sig: [HYDROCODONE-ACETAMINOPHEN 5-325 MG PER TABLET] Take 1 tablet by mouth every 8 (eight) hours as needed for pain.   allopurinoL (ZYLOPRIM) 300 MG tablet   No No   Sig: [ALLOPURINOL (ZYLOPRIM) 300 MG TABLET] Take 1 tablet two times a day.   allopurinoL  (ZYLOPRIM) 300 MG tablet   No No   Sig: [ALLOPURINOL (ZYLOPRIM) 300 MG TABLET] Take 1 tablet two times a day.   piroxicam (FELDENE) 20 MG capsule   No No   Sig: [PIROXICAM (FELDENE) 20 MG CAPSULE] Take one tab po qd, take with food.   predniSONE (DELTASONE) 10 mg tablet   No No   Sig: [PREDNISONE (DELTASONE) 10 MG TABLET] If experiencing gout flare then start with 4 tabs p.o. daily then decrease by 1 tab every 5 days to off. Take with food.   traMADoL (ULTRAM) 50 mg tablet   No No   Sig: [TRAMADOL (ULTRAM) 50 MG TABLET] Take 1 to 2 tablets every 6-8 hours, only to be taken as needed. Take 1 tab for pain levels of 4-6/10. Take 2 tabs for pain levels of 7-10/10. Can be sedating in some patients, recommend first trying on a day when not driving.      Facility-Administered Medications: None       ALLERGIES:  No Known Allergies    FAMILY HISTORY:  History reviewed. No pertinent family history.    SOCIAL HISTORY:  Social History     Tobacco Use     Smoking status: Former Smoker     Types: Cigarettes     Quit date: 2018     Years since quittin.6     Smokeless tobacco: Never Used     Tobacco comment: 5 cigarettes of marl[place]arao reds per day   Substance Use Topics     Alcohol use: Not Currently     Drug use: Never        VITALS:  Patient Vitals for the past 24 hrs:   BP Temp Temp src Pulse Resp SpO2 Weight   21 2222 (!) 147/93 98.4  F (36.9  C) Temporal 89 18 97 % 81.6 kg (180 lb)       PHYSICAL EXAM    General Appearance: Well-appearing, well-nourished, no acute distress   Head:  Normocephalic  Eyes:  conjunctiva/corneas clear  ENT:   membranes are moist without pallor  Cardio:  Regular rate and rhythm  Pulm:  No respiratory distress  Extremities:  Extremities normal, atraumatic, no cyanosis or edema, full ROM and motor tone intact, bilateral pulses intact upper and lower. Gouty tophi on the joints of both hands. Left 4th MCP and proximal pharynx swelling and erythema. Associated swelling into dorsum of hand.  Pain with ROM. CMS and intrinsic hand movement intact.   Skin:  Skin warm, dry, no rashes  Neuro:  Alert and oriented ×3, moving all extremities, no gross sensory defects        The creation of this record is based on the scribe s observations of the work being performed by Dolores Tabares MD and the provider s statements to them. It was created on his behalf by Wil Chew, a trained medical scribe. This document has been checked and approved by the attending provider.    Dolores Tabares MD  Emergency Medicine  [facility] EMERGENCY DEPARTMENT  [address] [place] [id number] [phone]  Dept: [phone]     Dolores Tabares MD  08/02/21 5370

## 2021-08-03 NOTE — ED TRIAGE NOTES
Patient has a history of gout in left hand, developed onset of swelling of left hand yesterday. Swelling increasing today, no injury noted. Patient states that course of prednisone and Toradol have been effective in the past.

## 2021-10-10 ENCOUNTER — HEALTH MAINTENANCE LETTER (OUTPATIENT)
Age: 26
End: 2021-10-10

## 2022-02-09 ENCOUNTER — OFFICE VISIT (OUTPATIENT)
Dept: FAMILY MEDICINE | Facility: CLINIC | Age: 27
End: 2022-02-09

## 2022-02-09 VITALS
DIASTOLIC BLOOD PRESSURE: 85 MMHG | OXYGEN SATURATION: 97 % | HEART RATE: 81 BPM | SYSTOLIC BLOOD PRESSURE: 147 MMHG | TEMPERATURE: 97.7 F

## 2022-02-09 DIAGNOSIS — M1A.9XX1 TOPHACEOUS GOUT: Primary | ICD-10-CM

## 2022-02-09 PROCEDURE — 99214 OFFICE O/P EST MOD 30 MIN: CPT | Performed by: PHYSICIAN ASSISTANT

## 2022-02-09 RX ORDER — PREDNISONE 20 MG/1
40 TABLET ORAL DAILY
Qty: 10 TABLET | Refills: 0 | Status: SHIPPED | OUTPATIENT
Start: 2022-02-09 | End: 2022-02-14

## 2022-02-09 NOTE — PROGRESS NOTES
Patient presents with:  Knee Pain: x 3 days right knee pain, no known injury, did not fall, swelling pain is getting worse      Clinical Decision Making:  Patient has had a longstanding history of tophaceous gout.  Patient has been seen by rheumatology in the past.  Patient is here today ostensibly requesting Toradol treatment for his gout.  He declined screening labs and x-ray.  Patient was placed on a short course of prednisone.  I did recommend that he have close follow-up with rheumatology for definitive evaluation and treatment.  He was cautioned that there could be long-term consequences and sequela with untreated tophaceous gout.  Questions were answered to his satisfaction before discharge.      ICD-10-CM    1. Tophaceous gout  M1A.9XX1 predniSONE (DELTASONE) 20 MG tablet       Patient Instructions   Follow-up with your primary care provider or orthopedics for evaluation of your tophaceous gout  There are chronic medicines that can help lower the uric acid level.  If you do not treat the tophaceous gout it can destroy your tendons or your joints or both      Take the medication as written.  Follow up with primary care provider if you do not get resolution with the course of treatment.  Return to walk-in care if complication or new symptoms arise in the interim.                        HPI:  Katya Purdy is a 26 year old male who presents today for evaluation of a painful knee.  Patient states that he has pain and swelling in the knee.  He has had a longstanding history of gout.  In fact he has tophaceous gout on the right third MCP joint.  He has also had swelling and pain at the right tibial tuberosity and now has had pain and swelling of the right knee.  Patient is ostensibly asking for a treatment with Toradol.  His symptoms have been worsening over the last 3 to 4 days.    History obtained from chart review and the patient.    Problem List:  2019-08: Hemarthrosis  2019-05: S/P arthrocentesis  2018-12:  Acute gouty arthritis  2018-11: Tophaceous gout  2018-09: Healthcare maintenance  2018-06: Acute monoarthritis  2014-03: Gout  2014-03: Tobacco use disorder  SIRS (systemic inflammatory response syndrome) (H)      Past Medical History:   Diagnosis Date     Arthritis     gout     Gout      PDA (patent ductus arteriosus)        Social History     Tobacco Use     Smoking status: Former Smoker     Types: Cigarettes     Quit date: 12/1/2018     Years since quitting: 3.1     Smokeless tobacco: Never Used     Tobacco comment: 5 cigarettes of marlboro reds per day   Substance Use Topics     Alcohol use: Not Currently       Review of Systems  As above in HPI otherwise negative.    Vitals:    02/09/22 1502   BP: (!) 147/85   Pulse: 81   Temp: 97.7  F (36.5  C)   TempSrc: Tympanic   SpO2: 97%       General: Patient is resting comfortably no acute distress is afebrile  HEENT: Head is normocephalic atraumatic   eyes are PERRL EOMI sclera anicteric   Skin: Without rash non-diaphoretic  Musculoskeletal: Patient has pain and swelling in the right knee.  There is pain and swelling over the tibial tuberosity.  He is still able to extend his quadriceps mechanism and patellar tendon to resistance.    Physical Exam    At the end of the encounter, I discussed results, diagnosis, medications. Discussed red flags for immediate return to clinic/ER, as well as indications for follow up if no improvement. Patient understood and agreed to plan. Patient was stable for discharge.

## 2022-02-09 NOTE — PATIENT INSTRUCTIONS
Follow-up with your primary care provider or orthopedics for evaluation of your tophaceous gout  There are chronic medicines that can help lower the uric acid level.  If you do not treat the tophaceous gout it can destroy your tendons or your joints or both      Take the medication as written.  Follow up with primary care provider if you do not get resolution with the course of treatment.  Return to walk-in care if complication or new symptoms arise in the interim.

## 2022-02-14 ENCOUNTER — OFFICE VISIT (OUTPATIENT)
Dept: FAMILY MEDICINE | Facility: CLINIC | Age: 27
End: 2022-02-14

## 2022-02-14 ENCOUNTER — HOSPITAL ENCOUNTER (EMERGENCY)
Facility: HOSPITAL | Age: 27
Discharge: HOME | End: 2022-02-14

## 2022-02-14 VITALS
SYSTOLIC BLOOD PRESSURE: 124 MMHG | BODY MASS INDEX: 29.44 KG/M2 | WEIGHT: 188 LBS | OXYGEN SATURATION: 97 % | DIASTOLIC BLOOD PRESSURE: 81 MMHG | TEMPERATURE: 97.6 F | HEART RATE: 69 BPM

## 2022-02-14 DIAGNOSIS — M1A.9XX1 TOPHACEOUS GOUT: Primary | ICD-10-CM

## 2022-02-14 PROCEDURE — 96372 THER/PROPH/DIAG INJ SC/IM: CPT | Performed by: PHYSICIAN ASSISTANT

## 2022-02-14 PROCEDURE — 99214 OFFICE O/P EST MOD 30 MIN: CPT | Mod: 25 | Performed by: PHYSICIAN ASSISTANT

## 2022-02-14 RX ORDER — PREDNISONE 10 MG/1
TABLET ORAL
Qty: 50 TABLET | Refills: 0 | Status: SHIPPED | OUTPATIENT
Start: 2022-02-14 | End: 2022-03-06

## 2022-02-14 RX ORDER — KETOROLAC TROMETHAMINE 30 MG/ML
30 INJECTION, SOLUTION INTRAMUSCULAR; INTRAVENOUS ONCE
Status: COMPLETED | OUTPATIENT
Start: 2022-02-14 | End: 2022-02-14

## 2022-02-14 RX ADMIN — KETOROLAC TROMETHAMINE 30 MG: 30 INJECTION, SOLUTION INTRAMUSCULAR; INTRAVENOUS at 15:14

## 2022-02-14 ASSESSMENT — ENCOUNTER SYMPTOMS
COLOR CHANGE: 1
WOUND: 0
ARTHRALGIAS: 1

## 2022-02-14 NOTE — PROGRESS NOTES
Patient presents with:  Musculoskeletal Problem: right knee pain due to gout flares       Clinical Decision Making: Patient started on prednisone taper today according to previous prescription by the patient's rheumatologist.  Patient also requesting Toradol injection which was granted today.  Patient will start prednisone tomorrow.  Patient was given referral to establish care with rheumatology in [place].      ICD-10-CM    1. Tophaceous gout  M1A.9XX1 Adult Rheumatology  Referral     Rheumatology IP Consult     predniSONE (DELTASONE) 10 MG tablet     ketorolac (TORADOL) injection 30 mg       Patient Instructions   1. Try a Prednisone taper. Take this medicine in the morning and with food.   2. Follow up with Rheumatology in [place].       HPI:  Katya Purdy is a 26 year old male with past medical history of tophaceous gout who presents today complaining of right knee pain that the patient suspects is due to gout flare.  Patient was previously seen in walk-in care on 2/9/2022.  At that time he was started on prednisone burst.  He felt he was getting better until he finished the burst and then his pain returned.  In the past his rheumatologist was prescribed a prednisone taper.  He is interested in establishing care with a rheumatologist in [place] where he works part of time.  When he tried to schedule they required for him to have her referral.    History obtained from the patient.    Problem List:  2019-08: Hemarthrosis  2019-05: S/P arthrocentesis  2018-12: Acute gouty arthritis  2018-11: Tophaceous gout  2018-09: Healthcare maintenance  2018-06: Acute monoarthritis  2014-03: Gout  2014-03: Tobacco use disorder  SIRS (systemic inflammatory response syndrome) (H)      Past Medical History:   Diagnosis Date     Arthritis     gout     Gout      PDA (patent ductus arteriosus)        Social History     Tobacco Use     Smoking status: Former Smoker     Types: Cigarettes     Quit date: 12/1/2018     Years  since quitting: 3.2     Smokeless tobacco: Never Used     Tobacco comment: 5 cigarettes of marlkhriso reds per day   Substance Use Topics     Alcohol use: Not Currently       Review of Systems   Musculoskeletal: Positive for arthralgias (Right anterior knee).   Skin: Positive for color change. Negative for rash and wound.   All other systems reviewed and are negative.      Vitals:    02/14/22 1408   BP: 124/81   BP Location: Right arm   Patient Position: Right side   Cuff Size: Adult Large   Pulse: 69   Temp: 97.6  F (36.4  C)   TempSrc: Tympanic   SpO2: 97%   Weight: 85.3 kg (188 lb)       Physical Exam  Vitals and nursing note reviewed.   Constitutional:       General: He is not in acute distress.     Appearance: He is not toxic-appearing or diaphoretic.   HENT:      Head: Normocephalic and atraumatic.      Right Ear: External ear normal.      Left Ear: External ear normal.   Eyes:      Conjunctiva/sclera: Conjunctivae normal.   Pulmonary:      Effort: Pulmonary effort is normal. No respiratory distress.   Musculoskeletal:        Legs:    Neurological:      Mental Status: He is alert.   Psychiatric:         Mood and Affect: Mood normal.         Behavior: Behavior normal.         Thought Content: Thought content normal.         Judgment: Judgment normal.       At the end of the encounter, I discussed results, diagnosis, medications. Discussed red flags for immediate return to clinic/ER, as well as indications for follow up if no improvement. Patient understood and agreed to plan. Patient was stable for discharge.

## 2022-02-14 NOTE — PATIENT INSTRUCTIONS
1. Try a Prednisone taper. Take this medicine in the morning and with food.   2. Follow up with Rheumatology in [place].    23-Nov-2021

## 2022-07-16 ENCOUNTER — HEALTH MAINTENANCE LETTER (OUTPATIENT)
Age: 27
End: 2022-07-16

## 2022-09-18 ENCOUNTER — HEALTH MAINTENANCE LETTER (OUTPATIENT)
Age: 27
End: 2022-09-18

## 2022-11-11 NOTE — PROGRESS NOTES
Assessment/ Plan  Problem List Items Addressed This Visit        Unprioritized    Gout     With current exacerbation.  Right elbow  Unable to get colchicine covered, prednisone not effective, naproxen not effective, will go with 50 mg indomethacin  Medications taken for gout:  Uric acid lowering: Allopurinol, continue this, check labs next follow-up  Lab Results   Component Value Date    URICACID 13.4 (H) 09/26/2018     Lab Results   Component Value Date    CREATININE 0.81 09/26/2018     Rescue/relief: Indomethacin will be tried at this time  Plan: Treat acute attack, follow-up 3-month         Healthcare maintenance - Primary     Influenza vaccine             Subjective  CC:  Chief Complaint   Patient presents with     Hospital Visit Follow Up     Swollen elbow on Monday morning - No cause for the swelling      Establish Care     HPI:  Right Elbow pain  Narrative: Sudden onset that began 11/4 in the evening and quickly worsened.  Chills reported by emergency room doctor, patient currently denies.  Patient thinks this is a gout flare.  He was seen in the emergency room on 11/5, x-ray limited but negative.  No labs were done.  ER physician felt this was likely related to gout, prescribed oxycodone, naproxen  Notes:    Location of pain higher elbow  Severity/ Quality: Severe achiness  Modifying factors: Make it worse-movement   make it better-has not found medication that helps.  Previously on prednisone for gout which was not effective, naproxen has not helped, Vicodin did help.  Colchicine was not covered    History of shoulder problems/injury, or previous work-up/ treatment?  No, gout was in other locations    PFSH:  Patient Active Problem List   Diagnosis     Gout     Tobacco use disorder     Acute monoarthritis     Healthcare maintenance     Tophaceous gout     Current medications reviewed as follows:  Current Outpatient Prescriptions on File Prior to Visit   Medication Sig     HYDROcodone-acetaminophen 5-325 mg  CRITICAL CARE ADDENDUM:    See documentation Dr. Cates from earlier today for initial consultation.    61 year old male with a history of ischemic cardiomyopathy with severe systolic dysfunction, CABG and ICD in July, h/o LV thrombus, seizure disorder with calcified left parietal lesion, history of inconsistent medication adherence, presented to ED on 11/10 with dyspnea, hypoxia, likely ADHF, then had two seizures in ED and again in hospital, experienced PEA cardiac arrest on 11/11 at 0615, brief CPR and intubated but requiring multiple pressors, subsequently had recurrent bradycardic PEA arrest with prolonged CPR, one episode of VF during the code (received Mg, calcium, amiodarone), then with refractory lactic acidosis and multiple organ failure, refractory shock. Bedside US with no pericardial effusion, RV small (no PE), LV function severely reduced, dilated with EF likely 10%. Emergent right femoral arterial line placed when pulse regained, but severe lactic acidosis refractory to bicarbonate pushes and infusion, increased Ve on vent. High doses of continuous epi, norepi, vasopressin. Family at bedside, made patient DNR. Patient developed bradycardic near-PEA then asystole at 1202. Death pronounced on 11/11/22 at 1202. Family present with the patient.    Critical care time in addition to that spent by Dr. Cates: 120 minutes exclusive of procedures    Rory Martinez MD  Pulmonary and Critical Care Medicine  [facility]  Office [phone]   per tablet Take 1 tablet by mouth every 4 (four) hours as needed for pain.     naproxen (NAPROSYN) 500 MG tablet Take 1 tablet (500 mg total) by mouth 2 (two) times a day with meals.     [DISCONTINUED] allopurinol (ZYLOPRIM) 300 MG tablet Take 1 tablet (300 mg total) by mouth daily.     [DISCONTINUED] oxyCODONE-acetaminophen (PERCOCET/ENDOCET) 5-325 mg per tablet Take 1 tablet by mouth every 6 (six) hours as needed for pain.     No current facility-administered medications on file prior to visit.      History   Smoking Status     Current Every Day Smoker     Types: Cigarettes   Smokeless Tobacco     Never Used     Comment: 5 cigarettes of marlboro reds per day     Social History     Social History Narrative     Patient Care Team:  Evens Buenrostro MD as PCP - General (Family Medicine)  ROS  Full 10 system review including constitutional, respiratory, cardiac, gi, urinary, rheumatologic, neurologic, reproductive, dermatologic psychiatric is  performed  and is otherwise negative         Objective  Physical Exam  Vitals:    11/07/18 1447   BP: 118/72   Patient Site: Left Arm   Patient Position: Sitting   Cuff Size: Adult Regular   Pulse: 76   Resp: 20   Weight: 197 lb 12 oz (89.7 kg)     Body mass index is 30.97 kg/(m^2).  Patient is pleasant, no acute distress except when he moves his right arm  Right elbow is warm, swollen, mildly erythematous and splotchy locations  No active cellulitis  Diagnostics:   Not      Please note: Voice recognition software was used in this dictation.  It may therefore contain typographical errors.

## 2023-02-09 ENCOUNTER — HOSPITAL ENCOUNTER (OUTPATIENT)
Dept: GENERAL RADIOLOGY | Facility: HOSPITAL | Age: 28
Discharge: HOME | End: 2023-02-09
Attending: FAMILY MEDICINE | Admitting: FAMILY MEDICINE
Payer: COMMERCIAL

## 2023-02-09 ENCOUNTER — OFFICE VISIT (OUTPATIENT)
Dept: FAMILY MEDICINE | Facility: CLINIC | Age: 28
End: 2023-02-09
Payer: COMMERCIAL

## 2023-02-09 VITALS
DIASTOLIC BLOOD PRESSURE: 86 MMHG | HEART RATE: 80 BPM | OXYGEN SATURATION: 97 % | TEMPERATURE: 98.3 F | SYSTOLIC BLOOD PRESSURE: 146 MMHG | RESPIRATION RATE: 12 BRPM

## 2023-02-09 DIAGNOSIS — M25.562 ACUTE PAIN OF LEFT KNEE: Primary | ICD-10-CM

## 2023-02-09 DIAGNOSIS — M10.9 GOUT OF MULTIPLE SITES, UNSPECIFIED CAUSE, UNSPECIFIED CHRONICITY: ICD-10-CM

## 2023-02-09 DIAGNOSIS — M25.562 ACUTE PAIN OF LEFT KNEE: ICD-10-CM

## 2023-02-09 PROCEDURE — 99213 OFFICE O/P EST LOW 20 MIN: CPT | Mod: 25 | Performed by: FAMILY MEDICINE

## 2023-02-09 PROCEDURE — 96372 THER/PROPH/DIAG INJ SC/IM: CPT | Performed by: FAMILY MEDICINE

## 2023-02-09 PROCEDURE — 73562 X-RAY EXAM OF KNEE 3: CPT | Mod: LT

## 2023-02-09 RX ORDER — KETOROLAC TROMETHAMINE 30 MG/ML
30 INJECTION, SOLUTION INTRAMUSCULAR; INTRAVENOUS ONCE
Status: COMPLETED | OUTPATIENT
Start: 2023-02-09 | End: 2023-02-09

## 2023-02-09 RX ORDER — PREDNISONE 10 MG/1
TABLET ORAL
Qty: 60 TABLET | Refills: 0 | Status: SHIPPED | OUTPATIENT
Start: 2023-02-09 | End: 2023-07-28

## 2023-02-09 RX ORDER — COLCHICINE 0.6 MG/1
TABLET ORAL
Qty: 15 TABLET | Refills: 0 | Status: SHIPPED | OUTPATIENT
Start: 2023-02-09 | End: 2023-07-28

## 2023-02-09 RX ADMIN — KETOROLAC TROMETHAMINE 30 MG: 30 INJECTION, SOLUTION INTRAMUSCULAR; INTRAVENOUS at 19:40

## 2023-02-09 NOTE — PROGRESS NOTES
(M25.562) Acute pain of left knee  (primary encounter diagnosis)  Comment:     Acute inflammation left knee.  Based on history likely flareup of gout.  Will treat with prednisone taper.  I did provide some colchicine which she uses as needed for a flareup.  Reconnect with rheumatology since his last appointment was 2 years ago.  He is agreeable with these plans.    Plan: XR Knee Left 3 Views, ketorolac (TORADOL)         injection 30 mg, predniSONE (DELTASONE) 10 MG         tablet, Adult Rheumatology  Referral            (M10.9) Gout of multiple sites, unspecified cause, unspecified chronicity  Comment:   Plan: XR Knee Left 3 Views, predniSONE (DELTASONE) 10        MG tablet, colchicine (COLCYRS) 0.6 MG tablet,         Adult Rheumatology  Referral                CHIEF COMPLAINT    Pain and swelling in left knee onset today.      HISTORY    Patient was doing okay yesterday.  Today his left knee started to become stiff and later swollen and painful.  Not especially red.  Has somewhat limited range of motion.  No other painful joints at the present time.    He does have a history of gout in multiple locations.  He used to be on allopurinol and colchicine.  Prednisone has worked well for him.  He is not currently on medication.  A uric acid done 2 years ago was 12.4.    He moved away to [place] for a while but has returned to [place] in the last 4 months.      REVIEW OF SYSTEMS    No fever or chills.  No breathing problems.  No chest pains.  No nausea.      EXAM  BP (!) 146/86   Pulse 80   Temp 98.3  F (36.8  C) (Oral)   Resp 12   SpO2 97%     Patient is sitting in a wheelchair, not in distress.  HEENT unremarkable.  Nonlabored breathing.  There are some tophi visible on dorsum of the right hand over MCP J.  Left knee -moderately large effusion, slight warmth, range of motion from about 40 to 60 degrees and then uncomfortable.      X-rays remarkable only for some mild degenerative change.  No  unusual calcifications.

## 2023-02-23 ENCOUNTER — VIRTUAL VISIT (OUTPATIENT)
Dept: RHEUMATOLOGY | Facility: CLINIC | Age: 28
End: 2023-02-23
Payer: COMMERCIAL

## 2023-02-23 DIAGNOSIS — M1A.9XX1 TOPHACEOUS GOUT: Primary | ICD-10-CM

## 2023-02-23 PROCEDURE — 99417 PROLNG OP E/M EACH 15 MIN: CPT | Performed by: INTERNAL MEDICINE

## 2023-02-23 PROCEDURE — 99215 OFFICE O/P EST HI 40 MIN: CPT | Mod: VID | Performed by: INTERNAL MEDICINE

## 2023-02-23 RX ORDER — ALLOPURINOL 300 MG/1
300 TABLET ORAL DAILY
Qty: 90 TABLET | Refills: 1 | Status: SHIPPED | OUTPATIENT
Start: 2023-02-23 | End: 2023-07-07

## 2023-02-23 RX ORDER — PREDNISONE 10 MG/1
10 TABLET ORAL DAILY
Qty: 30 TABLET | Refills: 1 | Status: SHIPPED | OUTPATIENT
Start: 2023-02-23 | End: 2023-07-28

## 2023-02-23 RX ORDER — COLCHICINE 0.6 MG/1
TABLET ORAL
Qty: 90 TABLET | Refills: 1 | Status: SHIPPED | OUTPATIENT
Start: 2023-02-23 | End: 2023-07-28

## 2023-02-23 ASSESSMENT — PAIN SCALES - GENERAL: PAINLEVEL: NO PAIN (0)

## 2023-02-23 NOTE — PROGRESS NOTES
"Katya is a 28 year old who is being evaluated via a billable video visit.        Video-Visit Details    Type of service:  Video Visit     Originating Location (pt. Location): Home  Distant Location (provider location):  Off-site  Platform used for Video Visit: [employer]    Start 0732  End 0835    Rheumatology Clinic Visit  [facility]  Chase Hopson M.D.     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  Disabling joint symptoms have been increasing in frequency for the past several years.  Video exam shows pink-yellow swellings over multiple MCPs consistent with monosodium urate collections (tophi).  No signs of active inflammatory joint disease.    Data: Uric acid levels recorded between August 2018 and January 2021 showed a range of levels from a low of 6.6 noted in December 2019 to a high of 12.4 in 2021. In January 2021, comprehensive metabolic panel, rheumatoid factor, cyclic citrullinated peptide antibodies antibody, and CBC were normal.  In August 2019, joint fluid showed 27,000 white cells.    Imaging: X-ray of the left knee in February 2023 showed large joint effusion no fracture or dislocation.    Discussion: Patient has aggressive and tophaceous gout.  He describes worsening and frequent inflammatory joint symptoms, requiring repetitive doses of nonsteroidals, colchicine, and weekly prednisone of 15 to 20 mg in order to maintain full-time work.  He is a strong candidate for chronic urate lowering therapy.  I noted patient's report that \"allopurinol has not done anything for me\".  We discussed my suspicion that the " patient performed an impression of no benefit from allopurinol for 2 reasons.  First he was not prescribed/did not use flare prevention medication when starting and adjusting allopurinol in the past.  Secondly, he did not achieve a therapeutic dose of allopurinol.  We discussed my impression that in order for maximal benefit from allopurinol to be realized, the blood level of uric acid should be pushed below 6 mg/dL.  The lowest level of serum uric acid noted since 2018 was above that number.    Patient expressed strong desire to resume long-term urate lowering therapy, and expressed understanding that slow resolution of frequent gouty flares and even slower resolution of tophi, would be expected over many months to years.  We discussed the following plan:    Plan:  1.  Restart allopurinol 300 mg daily.  Take the medication every day at the same time without fail, regardless of whether a gouty attack is going on or not.  Do not change the dose without discussing with provider.  2.  7 to 14 days after starting allopurinol, have blood drawn.  3.  If blood uric acid levels are still greater than 6 mg/dL, increase allopurinol dose and take 400 mg total daily.  4.  Repeat allopurinol dose adjustment and follow-up blood draws per uric acid level as many times as necessary to achieve a blood level of uric acid of 6 mg/dL or less.  5.  While adjusting and optimizing allopurinol dose, take colchicine to reduce inflammatory joint disease flare.  Take one 0.6 mg tablet daily.  6.  If gouty symptoms still occur despite the colchicine, use prednisone 20 mg daily for 3 days to suppress/abort flaring arthritis.    RTC by video in 4-8 weeks    Chase Hopson MD  Staff Rheumatologist, [facility]    On the day of the encounter, a total of 62 minutes was spent in chart review, and in counseling and coordination of care, regarding the patient's complex medical problem of worsening tophaceous gout.           History of Present Illness:    Katya Purdy presents for evaluation of gout.    Interval history February 23, 2023    Patient is referred by Dr. Barry who evaluated the patient on February 9, 2023 and primary care.  At that visit, a history of acute swelling and pain of the left knee was elicited.  Prior history of gout was noted.  Plan was to provide ketorolac injection, prednisone taper, and as needed colchicine for gout flare.    Patient was last seen by rheumatologist Dr. Sapp in May 2021.  At that visit along history of recurrent episodic oligoarthritis and tophaceous gout was elicited.  Video examination revealed tophi in the right hand, as well as reduced wrist range of motion and swelling in multiple finger joints.  History of reported compliance with allopurinol was elicited since March 2021.  Impression was of acute flare on chronic gout with noncompliance with urate lowering therapy.  Plan was to reinstate allopurinol but increase the dose to 600 mg daily, and provide prednisone taper.  Piroxicam was suggested as a long-acting alternative to other nonsteroidals.  Dietary recommendations including avoiding alcoholic beverages and reducing red meat and shellfish consumption were promoted.    Today, he reports recurrent sudden onset arthritis for about 10 years. Initially, it started in his R big toe. Over the years, episodic arthritis has spread to his knees, wrists, fingers, elbows. He has had occasional sudden back pain between shoulder blades, immobilizing. Pain was so severe taht he could not move or work out.  Early on, he tried to control symptoms with diet. Later he started to go to urgent care; he had relief with shots of toradol, courses of prednisone, and colchicine. He has found that toradol and prednisone works best for him by combination. Either agent alone does not work well. Naproxen worked early on, but in recent years, neither naproxen and ibuprofen.    In the last 6 months, he has had attacks 1-2 times per  "month, sometimes lasting for days. Usually affecting the hands.    He reports that he has taken allopurinol for  4 years consecutively. He felt that it did not work to reduce flares of gout, so the drug was stopped.  He has not taken the medication for gout regularly for at least 18 months.    He uses prednisone 20 mg \"whenever I have a flare\", probably once week. He sometimes alternates with colchicien 0.6 one tab, with modest relief.    His diet is chicekn and rice, vegetables. He does not use aspirin. He formerly drank heavily; he reports none for 3 years.           Review of Systems:     Constitutional: negative  Skin: negative  Eyes: negative  Ears/Nose/Throat: negative  Respiratory: No shortness of breath, dyspnea on exertion, cough, or hemoptysis  Cardiovascular: negative  Gastrointestinal: negative  Genitourinary: negative  Musculoskeletal: negative  Neurologic: negative  Psychiatric: negative  Hematologic/Lymphatic/Immunologic: negative  Endocrine: negative         Active Problem List:     Patient Active Problem List    Diagnosis Date Noted     SIRS (systemic inflammatory response syndrome) (H)      Priority: Medium     Hemarthrosis 08/27/2019     Priority: Medium     S/P arthrocentesis 05/27/2019     Priority: Medium     Acute gouty arthritis 12/13/2018     Priority: Medium     Tophaceous gout 11/07/2018     Priority: Medium     Healthcare maintenance 09/26/2018     Priority: Medium     Acute monoarthritis 06/21/2018     Priority: Medium     Gout 03/31/2014     Priority: Medium     Overview:   3/2014.  Ankle tap , + crystals. 4th attack in one year         Tobacco use disorder 03/31/2014     Priority: Medium            Past Medical History:     Past Medical History:   Diagnosis Date     Arthritis     gout     Gout      PDA (patent ductus arteriosus)      No past surgical history on file.    Tophaceous gout, with onset of inflammatory episodic arthritis starting before age 19.       Social History: "     Social History     Socioeconomic History     Marital status: Single     Spouse name: Not on file     Number of children: Not on file     Years of education: Not on file     Highest education level: Not on file   Occupational History     Not on file   Tobacco Use     Smoking status: Former     Types: Cigarettes     Quit date: 2018     Years since quittin.2     Smokeless tobacco: Never     Tobacco comments:     5 cigarettes of [place] reds per day   Substance and Sexual Activity     Alcohol use: Not Currently     Drug use: Never     Sexual activity: Not Currently     Birth control/protection: Abstinence   Other Topics Concern     Not on file   Social History Narrative     Not on file     Social Determinants of Health     Financial Resource Strain: Not on file   Food Insecurity: Not on file   Transportation Needs: Not on file   Physical Activity: Not on file   Stress: Not on file   Social Connections: Not on file   Intimate Partner Violence: Not on file   Housing Stability: Not on file       Works as an autobody , 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 No shellfish or red meat. He does not use aspirin. He formerly drank heavily; he reports none for 3 years.  Moved back to [place] in 2022 after a period of working out of [facility], and a an 18-month period during which she took no allopurinol.         Family History:   No family history on file.  Uncles and grandpa on both sides with gout. No RA or SLE       Allergies:   No Known Allergies         Medications:     Current Outpatient Medications   Medication Sig Dispense Refill     colchicine (COLCYRS) 0.6 MG tablet One per day if needed for gout flare. 15 tablet 0     predniSONE (DELTASONE) 10 MG tablet Take 5 tabs daily for 4 days then decrease by one every 4 days until gone. 60 tablet 0            Physical Exam:   There were no vitals taken for this visit.  Wt  Readings from Last 6 Encounters:   02/14/22 85.3 kg (188 lb)   08/02/21 81.6 kg (180 lb)   02/20/20 75.9 kg (167 lb 6.4 oz)   12/04/19 79.1 kg (174 lb 4.8 oz)   11/18/19 80.9 kg (178 lb 4.8 oz)   10/30/19 79.3 kg (174 lb 14.4 oz)     There is no height or weight on file to calculate BMI.  Constitutional: well-developed, appearing stated age; cooperative, video visit conducted in the [employer].  Eyes: nl EOM, PERRLA, vision, conjunctiva, sclera  ENT: nl external ears, nose, hearing, lips, teeth, gums, throat  No mucous membrane lesions, normal saliva pool  Neck: no mass or thyroid enlargement  Resp: Breathing unlabored    MS: Right fist formation was slightly impaired, but no self tenderness was reported at any MCPs, PIPs in either hand.  Wrists, elbows, and shoulders showed full range of motion.  Skin: Pink swellings over the MCPs 3 and 5 on the right hand.  Neuro: nl cranial nerves  Psych: nl judgement, orientation, memory, affect.         Data:     @  RHEUM RESULTS Latest Ref Rng & Units 11/22/2019 11/23/2019 1/20/2021   ALBUMIN 3.5 - 5.0 g/dL - - 4.2   ALT 0 - 45 U/L - - 34   AST 0 - 40 U/L - - 24   CREATININE 0.70 - 1.30 mg/dL 0.77 - 0.81   GFR ESTIMATE, IF BLACK >60 mL/min/1.73m2 >60 - >60   GFR ESTIMATE >60 mL/min/1.73m2 >60 - >60   HEMATOCRIT 40.0 - 54.0 % 33.9(L) 39.9(L) 45.2   HEMOGLOBIN 14.0 - 18.0 g/dL 11.1(L) 12.7(L) 15.1   WBC 4.0 - 11.0 thou/uL 15.3(H) 16.8(H) 10.2   RBC 4.40 - 6.20 mill/uL 3.99(L) 4.66 5.25   RDW 11.0 - 14.5 % 14.0 14.3 13.7   MCHC 32.0 - 36.0 g/dL 32.7 31.8(L) 33.4   MCV 80 - 100 fL 85 86 86   PLATELET COUNT 140 - 440 thou/uL 234 300 227   ESR 0 - 15 mm/hr - 90(H) -        ,   Cyclic Citrullinated Peptide Antibody IgG   Date Value Ref Range Status   01/20/2021 <0.5 <=4.9 U/mL Final   ,  ,  ,  ,  ,  ,  ,  ,  ,  ,  ,  ,  ,  ,  ,  ,  ,  ,  ,  ,  ,  ,  ,  ,  ,  ,  ,  ,  ,  ,  ,  ,  ,  ,  ,  ,  ,  ,  ,  ,  ,  ,  ,

## 2023-02-23 NOTE — PATIENT INSTRUCTIONS
Diagnosis:  1.  Longstanding gout with collections of uric acid in the skin (tophi).  Plan to restart allopurinol with a goal of slowly reducing the collections of uric acid and the rate of gouty flares.    Plan:  1.  Restart allopurinol 300 mg daily.  Take the medication every day at the same time without fail, regardless of whether a gouty attack is going on.  Do not change the dose without discussing with provider.  2.  7 to 14 days after starting allopurinol, have blood drawn.  3.  If blood uric acid levels are still greater than 6 mg/dL, increase allopurinol dose and take 400 mg total daily.  4.  Repeat allopurinol dose adjustment/increase and follow-up blood draws for uric acid level as many times as necessary to achieve a blood level of uric acid of 6 mg/dL or less.  5.  While adjusting and optimizing allopurinol dose, take colchicine to reduce inflammatory joint disease flare.  Take one 0.6 mg tablet daily.  6.  If gouty symptoms still occur despite the colchicine, use prednisone 20 mg daily for 3 days to suppress/abort flaring arthritis.

## 2023-02-24 ENCOUNTER — TELEPHONE (OUTPATIENT)
Dept: RHEUMATOLOGY | Facility: CLINIC | Age: 28
End: 2023-02-24
Payer: COMMERCIAL

## 2023-02-24 NOTE — TELEPHONE ENCOUNTER
LVM that I used a MEAGAN on May 11th at 10am with Dr. Hopson and to call us if it works or not.  Call back [phone]

## 2023-07-06 ENCOUNTER — MYC MEDICAL ADVICE (OUTPATIENT)
Dept: RHEUMATOLOGY | Facility: CLINIC | Age: 28
End: 2023-07-06
Payer: COMMERCIAL

## 2023-07-06 DIAGNOSIS — M1A.9XX1 TOPHACEOUS GOUT: Primary | ICD-10-CM

## 2023-07-06 NOTE — TELEPHONE ENCOUNTER
If he has been taking allopurinol consistently for the past 1 week at least, he can recheck serum uric acid, CBC, and creatinine.  If he has been taking allopurinol for less than that amount of time, wait until taking it every day for at least 1 week before getting blood drawn.    If joint symptoms recur with tapering prednisone, I recommend colchicine 0.6 mg up to twice daily for 7 days for gouty flares.  Dispense #30, 1 refill.  Continue allopurinol 300 mg daily for now.  Follow-up appointment next available. thx

## 2023-07-06 NOTE — TELEPHONE ENCOUNTER
Patient called in regards to [employer] message sent.  Was seen 2/23/23.  He did not follow up with labs after starting allopurinol.  He is currently taking allopurinol 300mg per day.  On colchicine, but ran out and has not picked up refill. Was in ER yesterday (notes in Epic) with gout flare in the knees.  Given prednisone 40mg for 5 days.  He is wondering what next steps are?  Should he have labs done he was to have done in February?  He is not scheduled for a follow up, but was advised to do so.       Tammie Rapp RN

## 2023-07-07 ENCOUNTER — OFFICE VISIT (OUTPATIENT)
Dept: RHEUMATOLOGY | Facility: CLINIC | Age: 28
End: 2023-07-07
Attending: INTERNAL MEDICINE
Payer: COMMERCIAL

## 2023-07-07 ENCOUNTER — LAB (OUTPATIENT)
Dept: LAB | Facility: CLINIC | Age: 28
End: 2023-07-07
Payer: COMMERCIAL

## 2023-07-07 ENCOUNTER — TELEPHONE (OUTPATIENT)
Dept: RHEUMATOLOGY | Facility: CLINIC | Age: 28
End: 2023-07-07

## 2023-07-07 VITALS
OXYGEN SATURATION: 98 % | BODY MASS INDEX: 30.07 KG/M2 | SYSTOLIC BLOOD PRESSURE: 138 MMHG | HEART RATE: 72 BPM | DIASTOLIC BLOOD PRESSURE: 82 MMHG | WEIGHT: 192 LBS

## 2023-07-07 DIAGNOSIS — M1A.9XX1 TOPHACEOUS GOUT: ICD-10-CM

## 2023-07-07 LAB
ALBUMIN SERPL BCG-MCNC: 4.6 G/DL (ref 3.5–5.2)
ALP SERPL-CCNC: 93 U/L (ref 40–129)
ALT SERPL W P-5'-P-CCNC: 53 U/L (ref 0–70)
ANION GAP SERPL CALCULATED.3IONS-SCNC: 11 MMOL/L (ref 7–15)
AST SERPL W P-5'-P-CCNC: 33 U/L (ref 0–45)
BILIRUB SERPL-MCNC: 0.2 MG/DL
BUN SERPL-MCNC: 17.3 MG/DL (ref 6–20)
CALCIUM SERPL-MCNC: 9.5 MG/DL (ref 8.6–10)
CHLORIDE SERPL-SCNC: 106 MMOL/L (ref 98–107)
CREAT SERPL-MCNC: 0.96 MG/DL (ref 0.67–1.17)
DEPRECATED HCO3 PLAS-SCNC: 26 MMOL/L (ref 22–29)
ERYTHROCYTE [DISTWIDTH] IN BLOOD BY AUTOMATED COUNT: 13.2 % (ref 10–15)
GFR SERPL CREATININE-BSD FRML MDRD: >90 ML/MIN/1.73M2
GLUCOSE SERPL-MCNC: 124 MG/DL (ref 70–99)
HCT VFR BLD AUTO: 40.2 % (ref 40–53)
HGB BLD-MCNC: 13.4 G/DL (ref 13.3–17.7)
MCH RBC QN AUTO: 28.7 PG (ref 26.5–33)
MCHC RBC AUTO-ENTMCNC: 33.3 G/DL (ref 31.5–36.5)
MCV RBC AUTO: 86 FL (ref 78–100)
PLATELET # BLD AUTO: 267 10E3/UL (ref 150–450)
POTASSIUM SERPL-SCNC: 4.3 MMOL/L (ref 3.4–5.3)
PROT SERPL-MCNC: 8 G/DL (ref 6.4–8.3)
RBC # BLD AUTO: 4.67 10E6/UL (ref 4.4–5.9)
SODIUM SERPL-SCNC: 143 MMOL/L (ref 136–145)
URATE SERPL-MCNC: 6.4 MG/DL (ref 3.4–7)
WBC # BLD AUTO: 10.1 10E3/UL (ref 4–11)

## 2023-07-07 PROCEDURE — 99214 OFFICE O/P EST MOD 30 MIN: CPT | Performed by: INTERNAL MEDICINE

## 2023-07-07 PROCEDURE — G0463 HOSPITAL OUTPT CLINIC VISIT: HCPCS | Performed by: INTERNAL MEDICINE

## 2023-07-07 PROCEDURE — 84550 ASSAY OF BLOOD/URIC ACID: CPT | Performed by: PATHOLOGY

## 2023-07-07 PROCEDURE — 85027 COMPLETE CBC AUTOMATED: CPT | Performed by: PATHOLOGY

## 2023-07-07 PROCEDURE — 80053 COMPREHEN METABOLIC PANEL: CPT | Performed by: PATHOLOGY

## 2023-07-07 PROCEDURE — 36415 COLL VENOUS BLD VENIPUNCTURE: CPT | Performed by: PATHOLOGY

## 2023-07-07 RX ORDER — ALLOPURINOL 300 MG/1
300 TABLET ORAL DAILY
Qty: 90 TABLET | Refills: 2 | Status: SHIPPED | OUTPATIENT
Start: 2023-07-07 | End: 2024-03-14

## 2023-07-07 RX ORDER — COLCHICINE 0.6 MG/1
TABLET ORAL
Qty: 30 TABLET | Refills: 1 | Status: SHIPPED | OUTPATIENT
Start: 2023-07-07 | End: 2023-07-28

## 2023-07-07 RX ORDER — ALLOPURINOL 100 MG/1
TABLET ORAL
Qty: 90 TABLET | Refills: 1 | Status: SHIPPED | OUTPATIENT
Start: 2023-07-07 | End: 2024-02-15

## 2023-07-07 RX ORDER — PREDNISONE 10 MG/1
TABLET ORAL
Qty: 60 TABLET | Refills: 1 | Status: SHIPPED | OUTPATIENT
Start: 2023-07-07 | End: 2023-12-11

## 2023-07-07 RX ORDER — COLCHICINE 0.6 MG/1
0.6 CAPSULE ORAL DAILY
Qty: 90 CAPSULE | Refills: 1 | Status: SHIPPED | OUTPATIENT
Start: 2023-07-07 | End: 2023-07-28

## 2023-07-07 ASSESSMENT — PAIN SCALES - GENERAL: PAINLEVEL: MILD PAIN (2)

## 2023-07-07 NOTE — TELEPHONE ENCOUNTER
Call to patient to offer, confirm and schedule appt with Dr. Hopson this afternoon at [facility], with lab appt prior.    All questions answered.    Shanta Cao, KYLEN, RN  RN Care Coordinator Rheumatology

## 2023-07-07 NOTE — LETTER
7/7/2023       RE: Katya Purdy  [address]  [place] [address]     Dear Colleague,    Thank you for referring your patient, Katya Purdy, to the [facility] [place] at [facility]. Please see a copy of my visit note below.    Rheumatology Clinic Visit  [facility]  Chase Hopson M.D.     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 Exam shows modestly smaller pink-yellow swellings over multiple MCPs consistent with monosodium urate collections (tophi). No signs of active inflammatory joint disease.    Data: Most recent uric acid in the [place] system was high of 12.4 in 2021.  Comprehensive metabolic panel, CBC on July 7, 2023 normal.  Uric acid 6.4, down from greater than 7 noted in February 2023    Discussion: Patient has aggressive and tophaceous gout.  But he has worked hard to adhere to recommended urate lowering and anti-inflammatory therapy, and he is better.  Since February 2023, intensity and frequency of polyarticular flares has diminished substantially.  Patient still requires several times monthly use of prednisone, nonsteroidal, and colchicine for suppression of gouty flares in his hands, wrists, and sometimes knees and ankles.  We again discussed the importance of dosing allopurinol regularly every day without fail for optimal outcome of gout treatment.  We discussed again the target of uric acid blood level less than 6 mg/dL for best results.  Discussed the following plan.    Plan:  1.  Increase  "allopurinol from 300 mg daily to 400 mg daily (one 300 mg tablet, +1,100 mg tablet, taken together once a day, every day).  2.  10 to 14 days after increasing allopurinol dose, have blood drawn for 1 more uric acid level.  If uric acid is less than 6 mg/dL, continue the 400 mg allopurinol dose into the future.  3.  Resume colchicine 0.6 mg once daily for 30 days after allopurinol dose change.  Thereafter, use colchicine as needed for gouty flare, not every day.  4.  For gouty symptoms unresponsive to colchicine, use meloxicam 15 mg once daily.  5.  Use prednisone 20 mg daily for 3 days for flaring gout unresponsive to meloxicam.  Avoid using prednisone courses more than 6 days/month.    RTC 6-7 mos    Chase Hopson MD  Staff Rheumatologist, [facility]    .[facility]       History of Present Illness:   Katya Purdy presents for followup of gout.  At the last visit, patient reported ongoing disabling joint symptoms.  Recommendation was to restart allopurinol 300 mg daily, and to test for adequacy of allopurinol dose with follow-up serum uric acid.  Colchicine and/or prednisone were recommended for flare abortive treatment.  Colchicine prophylactic treatment was recommended for at least 3 months after restarting allopurinol.    Interval history July 7, 2023    He reports having \"no major\" flares of former severe swelling pain in fingers hands wrists elbows and knees since February 2023.  He notes reduction in size of nontender pink-yellow swelling over the right third knuckle, approximately 30% since February.  He also notes reduced size of firm lumps in his outer ear on the left.  Long finger on L hand \"sticks\" for 5 minutes in the morning, but he is able to use his hands and small joints for work and activities of daily living without restriction.    He has taken colchicine 0.6 one tab daily since 2-2023  He takes allopurinol 300 mg also right away each morning--he has not missed any doses.  He has taken prednisone " intermittently with mild flares in his wrists, knees, elbow. He takes prednisone 20-30 mg daily for 3-4 days per episode. He always has prompt improvement in such instances, using prednisone 2 times per month.  He finds that eating pork or pork grease is likely to trigger flare.   He does not drink or eat seafood.    Interval history February 23, 2023    Patient is referred by Dr. Barry who evaluated the patient on February 9, 2023 and primary care.  At that visit, a history of acute swelling and pain of the left knee was elicited.  Prior history of gout was noted.  Plan was to provide ketorolac injection, prednisone taper, and as needed colchicine for gout flare.    Patient was last seen by rheumatologist Dr. Sapp in May 2021.  At that visit along history of recurrent episodic oligoarthritis and tophaceous gout was elicited.  Video examination revealed tophi in the right hand, as well as reduced wrist range of motion and swelling in multiple finger joints.  History of reported compliance with allopurinol was elicited since March 2021.  Impression was of acute flare on chronic gout with noncompliance with urate lowering therapy.  Plan was to reinstate allopurinol but increase the dose to 600 mg daily, and provide prednisone taper.  Piroxicam was suggested as a long-acting alternative to other nonsteroidals.  Dietary recommendations including avoiding alcoholic beverages and reducing red meat and shellfish consumption were promoted.    Today, he reports recurrent sudden onset arthritis for about 10 years. Initially, it started in his R big toe. Over the years, episodic arthritis has spread to his knees, wrists, fingers, elbows. He has had occasional sudden back pain between shoulder blades, immobilizing. Pain was so severe taht he could not move or work out.  Early on, he tried to control symptoms with diet. Later he started to go to urgent care; he had relief with shots of toradol, courses of prednisone, and  "colchicine. He has found that toradol and prednisone works best for him by combination. Either agent alone does not work well. Naproxen worked early on, but in recent years, neither naproxen and ibuprofen.    In the last 6 months, he has had attacks 1-2 times per month, sometimes lasting for days. Usually affecting the hands.    He reports that he has taken allopurinol for  4 years consecutively. He felt that it did not work to reduce flares of gout, so the drug was stopped.  He has not taken the medication for gout regularly for at least 18 months.    He uses prednisone 20 mg \"whenever I have a flare\", probably once week. He sometimes alternates with colchicien 0.6 one tab, with modest relief.    His diet is chicekn and rice, vegetables. He does not use aspirin. He formerly drank heavily; he reports none for 3 years.           Review of Systems:     Constitutional: negative  Skin: negative  Eyes: negative  Ears/Nose/Throat: negative  Respiratory: No shortness of breath, dyspnea on exertion, cough, or hemoptysis  Cardiovascular: negative  Gastrointestinal: negative  Genitourinary: negative  Musculoskeletal: negative  Neurologic: negative  Psychiatric: negative  Hematologic/Lymphatic/Immunologic: negative  Endocrine: negative         Active Problem List:     Patient Active Problem List    Diagnosis Date Noted     SIRS (systemic inflammatory response syndrome) (H)      Priority: Medium     Hemarthrosis 08/27/2019     Priority: Medium     S/P arthrocentesis 05/27/2019     Priority: Medium     Acute gouty arthritis 12/13/2018     Priority: Medium     Tophaceous gout 11/07/2018     Priority: Medium     Healthcare maintenance 09/26/2018     Priority: Medium     Acute monoarthritis 06/21/2018     Priority: Medium     Gout 03/31/2014     Priority: Medium     Overview:   3/2014.  Ankle tap , + crystals. 4th attack in one year         Tobacco use disorder 03/31/2014     Priority: Medium            Past Medical History: "     Past Medical History:   Diagnosis Date     Arthritis     gout     Gout      PDA (patent ductus arteriosus)      No past surgical history on file.    Tophaceous gout, with onset of inflammatory episodic arthritis starting before age 19.       Social History:     Social History     Socioeconomic History     Marital status: Single     Spouse name: Not on file     Number of children: Not on file     Years of education: Not on file     Highest education level: Not on file   Occupational History     Not on file   Tobacco Use     Smoking status: Former     Types: Cigarettes     Quit date: 2018     Years since quittin.6     Smokeless tobacco: Never     Tobacco comments:     Pt doesn't smoke anymore   Substance and Sexual Activity     Alcohol use: Not Currently     Drug use: Never     Sexual activity: Not Currently     Birth control/protection: Abstinence   Other Topics Concern     Not on file   Social History Narrative     Not on file     Social Determinants of Health     Financial Resource Strain: Not on file   Food Insecurity: Not on file   Transportation Needs: Not on file   Physical Activity: Not on file   Stress: Not on file   Social Connections: Not on file   Intimate Partner Violence: Not on file   Housing Stability: Not on file       Works as an autobody , 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 No shellfish or red meat. He does not use aspirin. He formerly drank heavily; he reports none for 3 years.  Moved back to [place] in 2022 after a period of working out of state, and a an 18-month period during which he took no allopurinol.         Family History:   No family history on file.  Uncles and grandpa on both sides with gout. No RA or SLE       Allergies:   No Known Allergies         Medications:     Current Outpatient Medications   Medication Sig Dispense Refill     allopurinol (ZYLOPRIM) 300 MG tablet Take  1 tablet (300 mg) by mouth daily 90 tablet 1     colchicine (COLCRYS) 0.6 MG tablet Take one tablet up to twice per day for 7 days for gouty flares (Patient not taking: Reported on 7/7/2023) 30 tablet 1     colchicine (COLCYRS) 0.6 MG tablet Take 1 tablet every day to suppress gouty flare. (Patient not taking: Reported on 7/7/2023) 90 tablet 1     colchicine (COLCYRS) 0.6 MG tablet One per day if needed for gout flare. 15 tablet 0     predniSONE (DELTASONE) 10 MG tablet Take 1 tablet (10 mg) by mouth daily For gouty flare not responding to colchicine, take 2 tablets daily for 3 days. (Patient not taking: Reported on 7/7/2023) 30 tablet 1     predniSONE (DELTASONE) 10 MG tablet Take 5 tabs daily for 4 days then decrease by one every 4 days until gone. 60 tablet 0            Physical Exam:   Blood pressure 138/82, pulse 72, weight 87.1 kg (192 lb), SpO2 98 %.  Wt Readings from Last 6 Encounters:   07/07/23 87.1 kg (192 lb)   02/14/22 85.3 kg (188 lb)   08/02/21 81.6 kg (180 lb)   02/20/20 75.9 kg (167 lb 6.4 oz)   12/04/19 79.1 kg (174 lb 4.8 oz)   11/18/19 80.9 kg (178 lb 4.8 oz)     Body mass index is 30.07 kg/m .  Constitutional: well-developed, appearing stated age; cooperative, video visit conducted in the [employer].  Eyes: nl EOM, PERRLA, vision, conjunctiva, sclera  ENT: nl external ears, nose, hearing, lips, teeth, gums, throat  No mucous membrane lesions, normal saliva pool  Neck: no mass or thyroid enlargement  Resp: Breathing unlabored    MS: Right fist formation was slightly impaired, but no self tenderness was reported at any MCPs, PIPs in either hand.  Wrists, elbows, and shoulders showed full range of motion.  Skin: Pink swellings over the MCPs 3 on the right hand.  Former swelling over the fifth MCP on the right hand disappeared.  Neuro: nl cranial nerves  Psych: nl judgement, orientation, memory, affect.         Data:     @      Latest Ref Rng & Units 11/23/2019     6:55 AM 1/20/2021      9:09 AM 7/7/2023     2:44 PM   RHEUM RESULTS   Albumin 3.5 - 5.0 g/dL  4.2     Albumin 3.5 - 5.2 g/dL   4.6    ALT 0 - 70 U/L  34  53    AST 0 - 45 U/L  24  33    Creatinine 0.67 - 1.17 mg/dL  0.81  0.96    GFR Estimate If Black >60 mL/min/1.73m2  >60     GFR Estimate >60 mL/min/1.73m2  >60  >90    Hematocrit 40.0 - 53.0 % 39.9  45.2  40.2    Hemoglobin 13.3 - 17.7 g/dL 12.7  15.1  13.4    WBC 4.0 - 11.0 10e3/uL 16.8  10.2  10.1    RBC Count 4.40 - 5.90 10e6/uL 4.66  5.25  4.67    RDW 10.0 - 15.0 % 14.3  13.7  13.2    MCHC 31.5 - 36.5 g/dL 31.8  33.4  33.3    MCV 78 - 100 fL 86  86  86    Platelet Count 150 - 450 10e3/uL 300  227  267    Sed Rate 0 - 15 mm/hr 90           ,   Cyclic Citrullinated Peptide Antibody IgG   Date Value Ref Range Status   01/20/2021 <0.5 <=4.9 U/mL Final   ,  ,  ,  ,  ,  ,  ,  ,  ,  ,  ,  ,  ,  ,  ,  ,  ,  ,  ,  ,  ,  ,  ,  ,  ,  ,  ,  ,  ,  ,  ,  ,  ,  ,  ,  ,  ,  ,  ,  ,  ,  ,  ,         Again, thank you for allowing me to participate in the care of your patient.      Sincerely,    Chase Hopson MD

## 2023-07-07 NOTE — TELEPHONE ENCOUNTER
Patient called and aware.  Follow up appointment made.  Patient aware to call back with further questions and/or concerns.     Tammie Rapp RN

## 2023-07-07 NOTE — PATIENT INSTRUCTIONS
Diagnosis:  1.  Tophaceous gout: Significant improvement in flare rate and intensity since February 2023.  Exam shows reduction in size of uric acid crystal collections on the left ear and the right third knuckle.  I recommend continuing urate lowering therapy with allopurinol.  Plan to target uric acid level of 6 mg/dL or lower.    Plan:  1.  Increase allopurinol from 300 mg daily to 400 mg daily (one 300 mg tablet, +1 100 mg tablet, taken together once a day, every day).  2.  10 to 14 days after increasing allopurinol dose, have blood drawn for 1 more uric acid level.  If uric acid is less than 6 mg/dL, continue the 400 mg allopurinol dose into the future.  3.  Resume colchicine 0.6 mg once daily for 30 days after allopurinol dose change.  Thereafter, use colchicine as needed for gouty flare, not every day.  4.  For gouty symptoms unresponsive to colchicine, use meloxicam 15 mg once daily.  5.  Use prednisone 20 mg daily for 3 days for flaring gout unresponsive to meloxicam.  Avoid using prednisone courses more than 6 days/month.

## 2023-07-07 NOTE — PROGRESS NOTES
Rheumatology Clinic Visit  [facility]  Chase Hopson M.D.     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 Exam shows modestly smaller pink-yellow swellings over multiple MCPs consistent with monosodium urate collections (tophi). No signs of active inflammatory joint disease.    Data: Most recent uric acid in the [place] system was high of 12.4 in 2021.  Comprehensive metabolic panel, CBC on July 7, 2023 normal.  Uric acid 6.4, down from greater than 7 noted in February 2023    Discussion: Patient has aggressive and tophaceous gout.  But he has worked hard to adhere to recommended urate lowering and anti-inflammatory therapy, and he is better.  Since February 2023, intensity and frequency of polyarticular flares has diminished substantially.  Patient still requires several times monthly use of prednisone, nonsteroidal, and colchicine for suppression of gouty flares in his hands, wrists, and sometimes knees and ankles.  We again discussed the importance of dosing allopurinol regularly every day without fail for optimal outcome of gout treatment.  We discussed again the target of uric acid blood level less than 6 mg/dL for best results.  Discussed the following plan.    Plan:  1.  Increase allopurinol from 300 mg daily to 400 mg daily (one 300 mg tablet, +1,100 mg tablet, taken together once a day, every day).  2.  10 to 14 days after increasing allopurinol dose, have blood drawn for 1 more uric acid level.  If uric acid is less than 6 mg/dL, continue the 400 mg allopurinol dose into the future.  3.   "Resume colchicine 0.6 mg once daily for 30 days after allopurinol dose change.  Thereafter, use colchicine as needed for gouty flare, not every day.  4.  For gouty symptoms unresponsive to colchicine, use meloxicam 15 mg once daily.  5.  Use prednisone 20 mg daily for 3 days for flaring gout unresponsive to meloxicam.  Avoid using prednisone courses more than 6 days/month.    RTC 6-7 mos    Chase Hopson MD  Staff Rheumatologist, [facility]    .[facility]       History of Present Illness:   Katya Purdy presents for followup of gout.  At the last visit, patient reported ongoing disabling joint symptoms.  Recommendation was to restart allopurinol 300 mg daily, and to test for adequacy of allopurinol dose with follow-up serum uric acid.  Colchicine and/or prednisone were recommended for flare abortive treatment.  Colchicine prophylactic treatment was recommended for at least 3 months after restarting allopurinol.    Interval history July 7, 2023    He reports having \"no major\" flares of former severe swelling pain in fingers hands wrists elbows and knees since February 2023.  He notes reduction in size of nontender pink-yellow swelling over the right third knuckle, approximately 30% since February.  He also notes reduced size of firm lumps in his outer ear on the left.  Long finger on L hand \"sticks\" for 5 minutes in the morning, but he is able to use his hands and small joints for work and activities of daily living without restriction.    He has taken colchicine 0.6 one tab daily since 2-2023  He takes allopurinol 300 mg also right away each morning--he has not missed any doses.  He has taken prednisone intermittently with mild flares in his wrists, knees, elbow. He takes prednisone 20-30 mg daily for 3-4 days per episode. He always has prompt improvement in such instances, using prednisone 2 times per month.  He finds that eating pork or pork grease is likely to trigger flare.   He does not drink or eat " seafood.    Interval history February 23, 2023    Patient is referred by Dr. Barry who evaluated the patient on February 9, 2023 and primary care.  At that visit, a history of acute swelling and pain of the left knee was elicited.  Prior history of gout was noted.  Plan was to provide ketorolac injection, prednisone taper, and as needed colchicine for gout flare.    Patient was last seen by rheumatologist Dr. Sapp in May 2021.  At that visit along history of recurrent episodic oligoarthritis and tophaceous gout was elicited.  Video examination revealed tophi in the right hand, as well as reduced wrist range of motion and swelling in multiple finger joints.  History of reported compliance with allopurinol was elicited since March 2021.  Impression was of acute flare on chronic gout with noncompliance with urate lowering therapy.  Plan was to reinstate allopurinol but increase the dose to 600 mg daily, and provide prednisone taper.  Piroxicam was suggested as a long-acting alternative to other nonsteroidals.  Dietary recommendations including avoiding alcoholic beverages and reducing red meat and shellfish consumption were promoted.    Today, he reports recurrent sudden onset arthritis for about 10 years. Initially, it started in his R big toe. Over the years, episodic arthritis has spread to his knees, wrists, fingers, elbows. He has had occasional sudden back pain between shoulder blades, immobilizing. Pain was so severe taht he could not move or work out.  Early on, he tried to control symptoms with diet. Later he started to go to urgent care; he had relief with shots of toradol, courses of prednisone, and colchicine. He has found that toradol and prednisone works best for him by combination. Either agent alone does not work well. Naproxen worked early on, but in recent years, neither naproxen and ibuprofen.    In the last 6 months, he has had attacks 1-2 times per month, sometimes lasting for days. Usually  "affecting the hands.    He reports that he has taken allopurinol for  4 years consecutively. He felt that it did not work to reduce flares of gout, so the drug was stopped.  He has not taken the medication for gout regularly for at least 18 months.    He uses prednisone 20 mg \"whenever I have a flare\", probably once week. He sometimes alternates with colchicien 0.6 one tab, with modest relief.    His diet is chicekn and rice, vegetables. He does not use aspirin. He formerly drank heavily; he reports none for 3 years.           Review of Systems:     Constitutional: negative  Skin: negative  Eyes: negative  Ears/Nose/Throat: negative  Respiratory: No shortness of breath, dyspnea on exertion, cough, or hemoptysis  Cardiovascular: negative  Gastrointestinal: negative  Genitourinary: negative  Musculoskeletal: negative  Neurologic: negative  Psychiatric: negative  Hematologic/Lymphatic/Immunologic: negative  Endocrine: negative         Active Problem List:     Patient Active Problem List    Diagnosis Date Noted     SIRS (systemic inflammatory response syndrome) (H)      Priority: Medium     Hemarthrosis 08/27/2019     Priority: Medium     S/P arthrocentesis 05/27/2019     Priority: Medium     Acute gouty arthritis 12/13/2018     Priority: Medium     Tophaceous gout 11/07/2018     Priority: Medium     Healthcare maintenance 09/26/2018     Priority: Medium     Acute monoarthritis 06/21/2018     Priority: Medium     Gout 03/31/2014     Priority: Medium     Overview:   3/2014.  Ankle tap , + crystals. 4th attack in one year         Tobacco use disorder 03/31/2014     Priority: Medium            Past Medical History:     Past Medical History:   Diagnosis Date     Arthritis     gout     Gout      PDA (patent ductus arteriosus)      No past surgical history on file.    Tophaceous gout, with onset of inflammatory episodic arthritis starting before age 19.       Social History:     Social History     Socioeconomic History     " Marital status: Single     Spouse name: Not on file     Number of children: Not on file     Years of education: Not on file     Highest education level: Not on file   Occupational History     Not on file   Tobacco Use     Smoking status: Former     Types: Cigarettes     Quit date: 2018     Years since quittin.6     Smokeless tobacco: Never     Tobacco comments:     Pt doesn't smoke anymore   Substance and Sexual Activity     Alcohol use: Not Currently     Drug use: Never     Sexual activity: Not Currently     Birth control/protection: Abstinence   Other Topics Concern     Not on file   Social History Narrative     Not on file     Social Determinants of Health     Financial Resource Strain: Not on file   Food Insecurity: Not on file   Transportation Needs: Not on file   Physical Activity: Not on file   Stress: Not on file   Social Connections: Not on file   Intimate Partner Violence: Not on file   Housing Stability: Not on file       Works as an autobody , 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 No shellfish or red meat. He does not use aspirin. He formerly drank heavily; he reports none for 3 years.  Moved back to [place] in 2022 after a period of working out of state, and a an 18-month period during which he took no allopurinol.         Family History:   No family history on file.  Uncles and grandpa on both sides with gout. No RA or SLE       Allergies:   No Known Allergies         Medications:     Current Outpatient Medications   Medication Sig Dispense Refill     allopurinol (ZYLOPRIM) 300 MG tablet Take 1 tablet (300 mg) by mouth daily 90 tablet 1     colchicine (COLCRYS) 0.6 MG tablet Take one tablet up to twice per day for 7 days for gouty flares (Patient not taking: Reported on 2023) 30 tablet 1     colchicine (COLCYRS) 0.6 MG tablet Take 1 tablet every day to suppress gouty flare. (Patient not taking:  Reported on 7/7/2023) 90 tablet 1     colchicine (COLCYRS) 0.6 MG tablet One per day if needed for gout flare. 15 tablet 0     predniSONE (DELTASONE) 10 MG tablet Take 1 tablet (10 mg) by mouth daily For gouty flare not responding to colchicine, take 2 tablets daily for 3 days. (Patient not taking: Reported on 7/7/2023) 30 tablet 1     predniSONE (DELTASONE) 10 MG tablet Take 5 tabs daily for 4 days then decrease by one every 4 days until gone. 60 tablet 0            Physical Exam:   Blood pressure 138/82, pulse 72, weight 87.1 kg (192 lb), SpO2 98 %.  Wt Readings from Last 6 Encounters:   07/07/23 87.1 kg (192 lb)   02/14/22 85.3 kg (188 lb)   08/02/21 81.6 kg (180 lb)   02/20/20 75.9 kg (167 lb 6.4 oz)   12/04/19 79.1 kg (174 lb 4.8 oz)   11/18/19 80.9 kg (178 lb 4.8 oz)     Body mass index is 30.07 kg/m .  Constitutional: well-developed, appearing stated age; cooperative, video visit conducted in the [employer].  Eyes: nl EOM, PERRLA, vision, conjunctiva, sclera  ENT: nl external ears, nose, hearing, lips, teeth, gums, throat  No mucous membrane lesions, normal saliva pool  Neck: no mass or thyroid enlargement  Resp: Breathing unlabored    MS: Right fist formation was slightly impaired, but no self tenderness was reported at any MCPs, PIPs in either hand.  Wrists, elbows, and shoulders showed full range of motion.  Skin: Pink swellings over the MCPs 3 on the right hand.  Former swelling over the fifth MCP on the right hand disappeared.  Neuro: nl cranial nerves  Psych: nl judgement, orientation, memory, affect.         Data:     @      Latest Ref Rng & Units 11/23/2019     6:55 AM 1/20/2021     9:09 AM 7/7/2023     2:44 PM   RHEUM RESULTS   Albumin 3.5 - 5.0 g/dL  4.2     Albumin 3.5 - 5.2 g/dL   4.6    ALT 0 - 70 U/L  34  53    AST 0 - 45 U/L  24  33    Creatinine 0.67 - 1.17 mg/dL  0.81  0.96    GFR Estimate If Black >60 mL/min/1.73m2  >60     GFR Estimate >60 mL/min/1.73m2  >60  >90    Hematocrit 40.0  - 53.0 % 39.9  45.2  40.2    Hemoglobin 13.3 - 17.7 g/dL 12.7  15.1  13.4    WBC 4.0 - 11.0 10e3/uL 16.8  10.2  10.1    RBC Count 4.40 - 5.90 10e6/uL 4.66  5.25  4.67    RDW 10.0 - 15.0 % 14.3  13.7  13.2    MCHC 31.5 - 36.5 g/dL 31.8  33.4  33.3    MCV 78 - 100 fL 86  86  86    Platelet Count 150 - 450 10e3/uL 300  227  267    Sed Rate 0 - 15 mm/hr 90           ,   Cyclic Citrullinated Peptide Antibody IgG   Date Value Ref Range Status   01/20/2021 <0.5 <=4.9 U/mL Final   ,  ,  ,  ,  ,  ,  ,  ,  ,  ,  ,  ,  ,  ,  ,  ,  ,  ,  ,  ,  ,  ,  ,  ,  ,  ,  ,  ,  ,  ,  ,  ,  ,  ,  ,  ,  ,  ,  ,  ,  ,  ,  ,

## 2023-07-17 ENCOUNTER — VIRTUAL VISIT (OUTPATIENT)
Dept: RHEUMATOLOGY | Facility: CLINIC | Age: 28
End: 2023-07-17
Attending: INTERNAL MEDICINE
Payer: COMMERCIAL

## 2023-07-17 DIAGNOSIS — M1A.9XX1 TOPHACEOUS GOUT: Primary | ICD-10-CM

## 2023-07-17 NOTE — PROGRESS NOTES
Medication Therapy Management (MTM) Encounter    ASSESSMENT:                            Medication Adherence/Access: See below for considerations    Gout: Patient noticing significant improvement in symptoms on current medications however has been unable to obtain colchicine due to cost. Checked pharmacy formulary and determined colchicine tablets should be a covered medication. Called [employer] and determined medication was being charged to a copay card and not insurance. Once insurance was used colchicine tablets were $20 per month. Pharmacy requested new prescription for tablets and this was provided. Additional script for meloxicam 15 mg as needed also sent per last rheumatology notes.    PLAN:                            1.  colchicine 0.6 mg from [employer] and take daily for 30 days then as needed for flares. Your copay should be $20 per month.    2. Complete uric acid labs in 1-2 weeks at any [place] lab location.    Follow-up: Return as needed.    SUBJECTIVE/OBJECTIVE:                          Katya Purdy is a 28 year old male called for an initial visit. He was referred to me from Chase Hopson MD.      Reason for visit: Colchicine is unaffordable, unsure if a different medication would be covered under insurance    Allergies/ADRs: Reviewed in chart  Past Medical History: Reviewed in chart  Tobacco: He reports that he quit smoking about 4 years ago. His smoking use included cigarettes. He has never used smokeless tobacco.  Alcohol: not currently using    Medication Adherence/Access: Medication barriers: affording medications. See below for details.    Gout:  Allopurinol 400 mg daily  Prednisone 20 mg daily x 3 days as needed for flares     Reports he was told at last visit to restart colchicine daily x 30 days then use as needed for flares after completing this. When he went to get his colchicine refill was told it will be $380 - unaffordable for him. Wondering if there is a similar medicine that  will be less expensive. Overall feeling much better - notes his gout symptoms are 90% improved and he has noticed a significant improvement in his pain. Started 400 mg allopurinol last Thursday, knows he needs to complete labs in the next 1-2 weeks.    Uric Acid   Date Value Ref Range Status   07/07/2023 6.4 3.4 - 7.0 mg/dL Final     GFR Estimate   Date Value Ref Range Status   07/07/2023 >90 >60 mL/min/1.73m2 Final   01/20/2021 >60 >60 mL/min/1.73m2 Final     Today's Vitals: There were no vitals taken for this visit.  ----------------  Post Discharge Medication Reconciliation Status: discharge medications reconciled, continue medications without change.    I spent 15 minutes with this patient today. All changes were made via collaborative practice agreement with Chase Hopson. A copy of the visit note was provided to the patient's provider(s).    A summary of these recommendations was sent via [employer].    Madelin Meyers, PharmD  Medication Therapy Management Pharmacist  [facility]  Phone: [phone]    Telemedicine Visit Details  Type of service:  Video Conference via [employer]  Start Time: 12:30 PM  End Time: 12:45 PM     Medication Therapy Recommendations  Gout    Current Medication: colchicine (MITIGARE) 0.6 MG capsule (Discontinued)   Rationale: Cannot afford medication product - Cost - Adherence   Recommendation: Change Medication - colchicine 0.6 MG tablet - Insurance updated at pharmacy at tablets covered   Status: Accepted per CPA

## 2023-07-17 NOTE — LETTER
7/17/2023       RE: Katya Purdy  [address]  [place] [address]     Dear Colleague,    Thank you for referring your patient, Katya Purdy, to the [facility]. Please see a copy of my visit note below.    Medication Therapy Management (MTM) Encounter    ASSESSMENT:                            Medication Adherence/Access: See below for considerations    Gout: Patient noticing significant improvement in symptoms on current medications however has been unable to obtain colchicine due to cost. Checked pharmacy formulary and determined colchicine tablets should be a covered medication. Called [employer] and determined medication was being charged to a copay card and not insurance. Once insurance was used colchicine tablets were $20 per month. Pharmacy requested new prescription for tablets and this was provided. Additional script for meloxicam 15 mg as needed also sent per last rheumatology notes.    PLAN:                            1.  colchicine 0.6 mg from [facility] and take daily for 30 days then as needed for flares. Your copay should be $20 per month.    2. Complete uric acid labs in 1-2 weeks at any [place] lab location.    Follow-up: Return as needed.    SUBJECTIVE/OBJECTIVE:                          Katya Purdy is a 28 year old male called for an initial visit. He was referred to me from Chase Hopson MD.      Reason for visit: Colchicine is unaffordable, unsure if a different medication would be covered under insurance    Allergies/ADRs: Reviewed in chart  Past Medical History: Reviewed in chart  Tobacco: He reports that he quit smoking about 4 years ago. His smoking use included cigarettes. He has never used smokeless tobacco.  Alcohol: not currently using    Medication Adherence/Access: Medication barriers: affording medications. See below for details.    Gout:  Allopurinol 400 mg  daily  Prednisone 20 mg daily x 3 days as needed for flares     Reports he was told at last visit to restart colchicine daily x 30 days then use as needed for flares after completing this. When he went to get his colchicine refill was told it will be $380 - unaffordable for him. Wondering if there is a similar medicine that will be less expensive. Overall feeling much better - notes his gout symptoms are 90% improved and he has noticed a significant improvement in his pain. Started 400 mg allopurinol last Thursday, knows he needs to complete labs in the next 1-2 weeks.    Uric Acid   Date Value Ref Range Status   07/07/2023 6.4 3.4 - 7.0 mg/dL Final     GFR Estimate   Date Value Ref Range Status   07/07/2023 >90 >60 mL/min/1.73m2 Final   01/20/2021 >60 >60 mL/min/1.73m2 Final     Today's Vitals: There were no vitals taken for this visit.  ----------------  Post Discharge Medication Reconciliation Status: discharge medications reconciled, continue medications without change.    I spent 15 minutes with this patient today. All changes were made via collaborative practice agreement with Chase Hopson. A copy of the visit note was provided to the patient's provider(s).    A summary of these recommendations was sent via [employer].    Ilya Meyers, PharmD  Medication Therapy Management Pharmacist  [facility]  Phone: [phone]    Telemedicine Visit Details  Type of service:  Video Conference via [employer]  Start Time: 12:30 PM  End Time: 12:45 PM     Medication Therapy Recommendations  Gout    Current Medication: colchicine (MITIGARE) 0.6 MG capsule (Discontinued)   Rationale: Cannot afford medication product - Cost - Adherence   Recommendation: Change Medication - colchicine 0.6 MG tablet - Insurance updated at pharmacy at tablets covered   Status: Accepted per CPA                Again, thank you for allowing me to participate in the care of your patient.      Sincerely,    ILYA FERNANDO  SHAIST[facility], [facility]

## 2023-07-28 RX ORDER — COLCHICINE 0.6 MG/1
TABLET ORAL
Qty: 30 TABLET | Refills: 3 | Status: SHIPPED | OUTPATIENT
Start: 2023-07-28 | End: 2024-05-10

## 2023-07-28 RX ORDER — MELOXICAM 15 MG/1
15 TABLET ORAL DAILY
Qty: 30 TABLET | Refills: 5 | Status: SHIPPED | OUTPATIENT
Start: 2023-07-28

## 2023-07-28 NOTE — PATIENT INSTRUCTIONS
"Recommendations from today's MTM visit:                                                       1.  colchicine 0.6 mg from [facility] and take daily for 30 days then as needed for flares. Your copay should be $20 per month.    2. Complete uric acid labs in 1-2 weeks at any [place] lab location.    Follow-up: Return as needed.    It was great speaking with you today.  I value your experience and would be very thankful for your time in providing feedback in our clinic survey. In the next few days, you may receive an email or text message from [employer] with a link to a survey related to your  clinical pharmacist.\"     To schedule another MTM appointment, please call the clinic directly or you may call the MTM scheduling line at [phone] or toll-free at [phone].     My Clinical Pharmacist's contact information:                                                      Please feel free to contact me with any questions or concerns you have.      Madelin Meyers, PharmD  Medication Therapy Management Pharmacist  [facility]  Phone: [phone]     "

## 2023-07-30 ENCOUNTER — HEALTH MAINTENANCE LETTER (OUTPATIENT)
Age: 28
End: 2023-07-30

## 2023-12-11 ENCOUNTER — MYC REFILL (OUTPATIENT)
Dept: RHEUMATOLOGY | Facility: CLINIC | Age: 28
End: 2023-12-11
Payer: COMMERCIAL

## 2023-12-11 DIAGNOSIS — M1A.9XX1 TOPHACEOUS GOUT: ICD-10-CM

## 2023-12-11 RX ORDER — COLCHICINE 0.6 MG/1
TABLET ORAL
Qty: 30 TABLET | Refills: 3 | Status: CANCELLED | OUTPATIENT
Start: 2023-12-11

## 2023-12-11 RX ORDER — ALLOPURINOL 300 MG/1
300 TABLET ORAL DAILY
Qty: 90 TABLET | Refills: 2 | Status: CANCELLED | OUTPATIENT
Start: 2023-12-11

## 2023-12-12 RX ORDER — PREDNISONE 10 MG/1
TABLET ORAL
Qty: 60 TABLET | Refills: 1 | Status: SHIPPED | OUTPATIENT
Start: 2023-12-12 | End: 2024-06-01

## 2023-12-12 NOTE — TELEPHONE ENCOUNTER
predniSONE (DELTASONE) 10 MG tablet   60 tablet 1 7/7/2023       Chase Hopson MD  Rheumatology  7/7/2023  [facility] [place]    Routed because: dose not on protocol.my chart request

## 2024-02-15 DIAGNOSIS — M1A.9XX1 TOPHACEOUS GOUT: ICD-10-CM

## 2024-02-15 RX ORDER — ALLOPURINOL 100 MG/1
TABLET ORAL
Qty: 90 TABLET | Refills: 0 | Status: SHIPPED | OUTPATIENT
Start: 2024-02-15 | End: 2024-03-14

## 2024-02-15 NOTE — TELEPHONE ENCOUNTER
allopurinol (ZYLOPRIM) 100 MG tablet   90 tablet 1 7/7/2023 7/7/2023  [facility]      Routed because: uric acid ordered, not drawn  Gout Agents Protocol Nujptz51/15/2024 06:38 AM   Protocol Details Has Uric Acid on file in past 12 months and value is less than 6    Medication indicated for associated diagnosis

## 2024-03-13 NOTE — PROGRESS NOTES
[facility]  Patient gave permission with telephone call to patient.  Video start 747  Video end 460      Rheumatology Clinic Visit  [facility]  Chase Hopson M.D.     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 video exam shows patient in his vehicle parked at the side of the road.  Blue-purple 1.5 cm round swelling over the dorsum of his right third MCP was visible.  Full fist closure and range of motion of the fingers was noted.  No signs of active inflammatory joint disease.    Data: Blood work in July 2023 showed comprehensive metabolic panel normal; CBC normal; most recent uric acid 6.4, down from 12.4 in January 2021.    Discussion: Patient has aggressive and tophaceous gout. Since February 2023, intensity and frequency of polyarticular flares has diminished substantially. Patient requiring much less frequent use of prednisone, nonsteroidal, and colchicine for suppression of gouty flares in his hands, wrists, and sometimes knees and ankles.  We again discussed the importance of dosing allopurinol regularly every day without fail for optimal outcome of gout treatment.  We discussed again the target of uric acid blood level less than 6 mg/dL for best results.  Discussed the following plan.    Plan:  1.  Continue allopurinol 400 mg daily (one 300 mg tablet, +1,100 mg tablet, taken together once a day, every day), check uric acid, creatinine, CBC today.  2.  If uric acid is less than 6 mg/dL, continue the 400 mg allopurinol dose into the future.  3.  use  "colchicine 0.6 mg twice daily for 3 days as needed for gouty flare, not every day.  4.  For gouty symptoms unresponsive to colchicine, use prednisone 20 mg daily for 3 days for flaring gout unresponsive to colchicine.    RTC 1 year    Chase Hopson MD  Staff Rheumatologist, [facility]           History of Present Illness:   Katya Purdy presents for followup of gout.  Patient was last seen in July 2023.  At that visit, the intensity and frequency of gouty flares had improved significantly.  Uric acid was still not at optimal concentration.  Recommendation was to increase allopurinol and remeasure serum urate, and to resume colchicine prophylactic treatment.    Interval history March 14, 2024    He had one episode of a flare in his hips about one month ago.  He had pain in both hips and in his hands not associated with significant swelling or redness.  He took prednisone 20 mg for 2 days, symptoms resolved and he stopped taking medication.  He has not been using as needed nonsteroidals or colchicine for many months.  Otherwise, he has been free of any joint pain or recurrent need for anti-inflammatories or pain medications.  He reports that lumps over his fingers knuckles and toes have been gradually decreasing in size and color has changed to red-purple.  No new lumps since last visit.  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    He reports taking allopurinol 300 mg +100 mg tablet each day without fail.  No adverse effects.      Interval history July 7, 2023    He reports having \"no major\" flares of former severe swelling pain in fingers hands wrists elbows and knees since February 2023.  He notes reduction in size of nontender pink-yellow swelling over the right third knuckle, approximately 30% since February.  He also notes reduced size of firm lumps in his outer ear on the left.  Long finger on L hand \"sticks\" for 5 minutes in the morning, but he is able to use his hands and small joints for work and " activities of daily living without restriction.    He has taken colchicine 0.6 one tab daily since 2-2023  He takes allopurinol 300 mg also right away each morning--he has not missed any doses.  He has taken prednisone intermittently with mild flares in his wrists, knees, elbow. He takes prednisone 20-30 mg daily for 3-4 days per episode. He always has prompt improvement in such instances, using prednisone 2 times per month.  He finds that eating pork or pork grease is likely to trigger flare.   He does not drink or eat seafood.    Interval history February 23, 2023    Patient is referred by Dr. Barry who evaluated the patient on February 9, 2023 and primary care.  At that visit, a history of acute swelling and pain of the left knee was elicited.  Prior history of gout was noted.  Plan was to provide ketorolac injection, prednisone taper, and as needed colchicine for gout flare.    Patient was last seen by rheumatologist Dr. Sapp in May 2021.  At that visit along history of recurrent episodic oligoarthritis and tophaceous gout was elicited.  Video examination revealed tophi in the right hand, as well as reduced wrist range of motion and swelling in multiple finger joints.  History of reported compliance with allopurinol was elicited since March 2021.  Impression was of acute flare on chronic gout with noncompliance with urate lowering therapy.  Plan was to reinstate allopurinol but increase the dose to 600 mg daily, and provide prednisone taper.  Piroxicam was suggested as a long-acting alternative to other nonsteroidals.  Dietary recommendations including avoiding alcoholic beverages and reducing red meat and shellfish consumption were promoted.    Today, he reports recurrent sudden onset arthritis for about 10 years. Initially, it started in his R big toe. Over the years, episodic arthritis has spread to his knees, wrists, fingers, elbows. He has had occasional sudden back pain between shoulder blades,  "immobilizing. Pain was so severe taht he could not move or work out.  Early on, he tried to control symptoms with diet. Later he started to go to urgent care; he had relief with shots of toradol, courses of prednisone, and colchicine. He has found that toradol and prednisone works best for him by combination. Either agent alone does not work well. Naproxen worked early on, but in recent years, neither naproxen and ibuprofen.    In the last 6 months, he has had attacks 1-2 times per month, sometimes lasting for days. Usually affecting the hands.    He reports that he has taken allopurinol for  4 years consecutively. He felt that it did not work to reduce flares of gout, so the drug was stopped.  He has not taken the medication for gout regularly for at least 18 months.    He uses prednisone 20 mg \"whenever I have a flare\", probably once week. He sometimes alternates with colchicien 0.6 one tab, with modest relief.             Review of Systems:     Constitutional: negative  Skin: negative  Eyes: negative  Ears/Nose/Throat: negative  Respiratory: No shortness of breath, dyspnea on exertion, cough, or hemoptysis  Cardiovascular: negative  Gastrointestinal: negative  Genitourinary: negative  Musculoskeletal: negative  Neurologic: negative  Psychiatric: negative  Hematologic/Lymphatic/Immunologic: negative  Endocrine: negative         Active Problem List:     Patient Active Problem List    Diagnosis Date Noted    SIRS (systemic inflammatory response syndrome) (H)      Priority: Medium    Hemarthrosis 08/27/2019     Priority: Medium    S/P arthrocentesis 05/27/2019     Priority: Medium    Acute gouty arthritis 12/13/2018     Priority: Medium    Tophaceous gout 11/07/2018     Priority: Medium    Healthcare maintenance 09/26/2018     Priority: Medium    Acute monoarthritis 06/21/2018     Priority: Medium    Gout 03/31/2014     Priority: Medium     Overview:   3/2014.  Ankle tap , + crystals. 4th attack in one year        " Tobacco use disorder 2014     Priority: Medium            Past Medical History:     Past Medical History:   Diagnosis Date    Arthritis     gout    Gout     PDA (patent ductus arteriosus)      No past surgical history on file.    Tophaceous gout, with onset of inflammatory episodic arthritis starting before age 19.       Social History:     Social History     Socioeconomic History    Marital status: Single     Spouse name: Not on file    Number of children: Not on file    Years of education: Not on file    Highest education level: Not on file   Occupational History    Not on file   Tobacco Use    Smoking status: Former     Types: Cigarettes     Quit date: 2018     Years since quittin.2    Smokeless tobacco: Never    Tobacco comments:     Pt doesn't smoke anymore   Substance and Sexual Activity    Alcohol use: Not Currently    Drug use: Never    Sexual activity: Not Currently     Birth control/protection: Abstinence   Other Topics Concern    Not on file   Social History Narrative    Not on file     Social Determinants of Health     Financial Resource Strain: Not on file   Food Insecurity: Not on file   Transportation Needs: Not on file   Physical Activity: Not on file   Stress: Not on file   Social Connections: Not on file   Interpersonal Safety: Not on file   Housing Stability: Not on file       Works as an autobody , 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 No shellfish or red meat. He does not use aspirin. He formerly drank heavily; he reports none since   Moved back to [place] in 2022 after a period of working out of state, and a an 18-month period during which he took no allopurinol.         Family History:   No family history on file.  Uncles and grandpa on both sides with gout. No RA or SLE       Allergies:   No Known Allergies         Medications:     Current Outpatient Medications   Medication Sig  Dispense Refill    allopurinol (ZYLOPRIM) 100 MG tablet Take 1 tab daily in combination with 1 tab 300 mg for total 400 mg daily 90 tablet 0    allopurinol (ZYLOPRIM) 300 MG tablet Take 1 tablet (300 mg) by mouth daily 90 tablet 2    colchicine (COLCRYS) 0.6 MG tablet Take 1 tablet daily for 30 days then 1 tablet twice daily x 7 days as needed for gout flares 30 tablet 3    meloxicam (MOBIC) 15 MG tablet Take 1 tablet (15 mg) by mouth daily If needed for gout pain 30 tablet 5    predniSONE (DELTASONE) 10 MG tablet Take 2 tabs daily for 4 days for gouty flare 60 tablet 1            Physical Exam:   There were no vitals taken for this visit.  Wt Readings from Last 6 Encounters:   07/07/23 87.1 kg (192 lb)   02/14/22 85.3 kg (188 lb)   08/02/21 81.6 kg (180 lb)   02/20/20 75.9 kg (167 lb 6.4 oz)   12/04/19 79.1 kg (174 lb 4.8 oz)   11/18/19 80.9 kg (178 lb 4.8 oz)     There is no height or weight on file to calculate BMI.  Constitutional: well-developed, appearing stated age; cooperative, video visit conducted in patient's vehicle  Eyes: nl EOM, PERRLA, vision, conjunctiva, sclera  ENT: nl external ears, nose, hearing, lips, teeth, gums, throat  No mucous membrane lesions, normal saliva pool  Neck: no mass or thyroid enlargement  Resp: Breathing unlabored    MS: Right fist formation was slightly impaired, but no self tenderness was reported at any MCPs, PIPs in either hand.  Wrists, elbows, and shoulders showed full range of motion.  Skin: 1.5 cm red-purple swelling over the dorsum of the right third MCP; former swelling over the fifth MCP on the right hand disappeared.  Neuro: nl cranial nerves  Psych: nl judgement, orientation, memory, affect.         Data:     @      Latest Ref Rng & Units 11/23/2019     6:55 AM 1/20/2021     9:09 AM 7/7/2023     2:44 PM   RHEUM RESULTS   Albumin 3.5 - 5.0 g/dL  4.2     Albumin 3.5 - 5.2 g/dL   4.6    ALT 0 - 70 U/L  34  53    AST 0 - 45 U/L  24  33    Creatinine 0.67 - 1.17 mg/dL   0.81  0.96    GFR Estimate If Black >60 mL/min/1.73m2  >60     GFR Estimate >60 mL/min/1.73m2  >60  >90    Hematocrit 40.0 - 53.0 % 39.9  45.2  40.2    Hemoglobin 13.3 - 17.7 g/dL 12.7  15.1  13.4    WBC 4.0 - 11.0 10e3/uL 16.8  10.2  10.1    RBC Count 4.40 - 5.90 10e6/uL 4.66  5.25  4.67    RDW 10.0 - 15.0 % 14.3  13.7  13.2    MCHC 31.5 - 36.5 g/dL 31.8  33.4  33.3    MCV 78 - 100 fL 86  86  86    Platelet Count 150 - 450 10e3/uL 300  227  267    Sed Rate 0 - 15 mm/hr 90           ,   Cyclic Citrullinated Peptide Antibody IgG   Date Value Ref Range Status   01/20/2021 <0.5 <=4.9 U/mL Final   ,  ,  ,  ,  ,  ,  ,  ,  ,  ,  ,  ,  ,  ,  ,  ,  ,  ,  ,  ,  ,  ,  ,  ,  ,  ,  ,  ,  ,  ,  ,  ,  ,  ,  ,  ,  ,  ,  ,  ,  ,  ,  ,

## 2024-03-14 ENCOUNTER — VIRTUAL VISIT (OUTPATIENT)
Dept: RHEUMATOLOGY | Facility: CLINIC | Age: 29
End: 2024-03-14
Payer: COMMERCIAL

## 2024-03-14 DIAGNOSIS — M1A.9XX1 TOPHACEOUS GOUT: Primary | ICD-10-CM

## 2024-03-14 PROCEDURE — 99213 OFFICE O/P EST LOW 20 MIN: CPT | Mod: 95 | Performed by: INTERNAL MEDICINE

## 2024-03-14 RX ORDER — ALLOPURINOL 300 MG/1
300 TABLET ORAL DAILY
Qty: 90 TABLET | Refills: 3 | Status: SHIPPED | OUTPATIENT
Start: 2024-03-14 | End: 2024-09-23

## 2024-03-14 RX ORDER — ALLOPURINOL 100 MG/1
TABLET ORAL
Qty: 90 TABLET | Refills: 3 | Status: SHIPPED | OUTPATIENT
Start: 2024-03-14 | End: 2024-09-23

## 2024-03-14 NOTE — PATIENT INSTRUCTIONS
Plan:  1.  Continue allopurinol 400 mg daily (one 300 mg tablet, +1,100 mg tablet, taken together once a day, every day), check uric acid, creatinine, CBC today.  2.  If uric acid is less than 6 mg/dL, continue the 400 mg allopurinol dose into the future.  3.  use colchicine 0.6 mg twice daily for 3 days as needed for gouty flare, not every day.  4.  For gouty symptoms unresponsive to colchicine, use prednisone 20 mg daily for 3 days for flaring gout unresponsive to colchicine.

## 2024-05-03 ENCOUNTER — LAB (OUTPATIENT)
Dept: LAB | Facility: CLINIC | Age: 29
End: 2024-05-03
Payer: COMMERCIAL

## 2024-05-03 DIAGNOSIS — M1A.9XX1 TOPHACEOUS GOUT: ICD-10-CM

## 2024-05-03 LAB
BASOPHILS # BLD AUTO: 0.1 10E3/UL (ref 0–0.2)
BASOPHILS NFR BLD AUTO: 1 %
EOSINOPHIL # BLD AUTO: 0.2 10E3/UL (ref 0–0.7)
EOSINOPHIL NFR BLD AUTO: 2 %
ERYTHROCYTE [DISTWIDTH] IN BLOOD BY AUTOMATED COUNT: 12.8 % (ref 10–15)
HCT VFR BLD AUTO: 43 % (ref 40–53)
HGB BLD-MCNC: 14.3 G/DL (ref 13.3–17.7)
IMM GRANULOCYTES # BLD: 0 10E3/UL
IMM GRANULOCYTES NFR BLD: 0 %
LYMPHOCYTES # BLD AUTO: 2.1 10E3/UL (ref 0.8–5.3)
LYMPHOCYTES NFR BLD AUTO: 23 %
MCH RBC QN AUTO: 29.5 PG (ref 26.5–33)
MCHC RBC AUTO-ENTMCNC: 33.3 G/DL (ref 31.5–36.5)
MCV RBC AUTO: 89 FL (ref 78–100)
MONOCYTES # BLD AUTO: 0.7 10E3/UL (ref 0–1.3)
MONOCYTES NFR BLD AUTO: 7 %
NEUTROPHILS # BLD AUTO: 6.1 10E3/UL (ref 1.6–8.3)
NEUTROPHILS NFR BLD AUTO: 67 %
PLATELET # BLD AUTO: 226 10E3/UL (ref 150–450)
RBC # BLD AUTO: 4.84 10E6/UL (ref 4.4–5.9)
WBC # BLD AUTO: 9.1 10E3/UL (ref 4–11)

## 2024-05-03 PROCEDURE — 84550 ASSAY OF BLOOD/URIC ACID: CPT

## 2024-05-03 PROCEDURE — 85025 COMPLETE CBC W/AUTO DIFF WBC: CPT

## 2024-05-03 PROCEDURE — 82565 ASSAY OF CREATININE: CPT

## 2024-05-03 PROCEDURE — 36415 COLL VENOUS BLD VENIPUNCTURE: CPT

## 2024-05-04 LAB
CREAT SERPL-MCNC: 0.97 MG/DL (ref 0.67–1.17)
EGFRCR SERPLBLD CKD-EPI 2021: >90 ML/MIN/1.73M2
URATE SERPL-MCNC: 5.7 MG/DL (ref 3.4–7)

## 2024-05-10 DIAGNOSIS — M1A.9XX1 TOPHACEOUS GOUT: ICD-10-CM

## 2024-05-17 DIAGNOSIS — M1A.9XX1 TOPHACEOUS GOUT: ICD-10-CM

## 2024-05-17 NOTE — TELEPHONE ENCOUNTER
predniSONE (DELTASONE) 10 MG tablet 60 tablet 1 12/12/2023       Last Office Visit: 3/14/24  Future Office visit:   none    Routing refill request to provider for review/approval because:  Flares not on refill protocol    Juju Mariscal RN  P Red Flag Triage/MRT

## 2024-05-21 RX ORDER — PREDNISONE 10 MG/1
TABLET ORAL
Qty: 60 TABLET | Refills: 1 | OUTPATIENT
Start: 2024-05-21

## 2024-05-21 RX ORDER — COLCHICINE 0.6 MG/1
TABLET ORAL
Qty: 6 TABLET | Refills: 5 | Status: SHIPPED | OUTPATIENT
Start: 2024-05-21 | End: 2024-07-10

## 2024-05-21 NOTE — TELEPHONE ENCOUNTER
3/14/2024  [facility] Chase Ocasio MD  RheumatologyTophaceous gout       Plan:  1.  Continue allopurinol 400 mg daily (one 300 mg tablet, +1,100 mg tablet, taken together once a day, every day), check uric acid, creatinine, CBC today.  2.  If uric acid is less than 6 mg/dL, continue the 400 mg allopurinol dose into the future.  3.  use colchicine 0.6 mg twice daily for 3 days as needed for gouty flare, not every day.  4.  For gouty symptoms unresponsive to colchicine, use prednisone 20 mg daily for 3 days for flaring gout unresponsive to colchicine.

## 2024-06-01 ENCOUNTER — MYC REFILL (OUTPATIENT)
Dept: RHEUMATOLOGY | Facility: CLINIC | Age: 29
End: 2024-06-01
Payer: COMMERCIAL

## 2024-06-01 DIAGNOSIS — M1A.9XX1 TOPHACEOUS GOUT: ICD-10-CM

## 2024-06-03 RX ORDER — PREDNISONE 10 MG/1
TABLET ORAL
Qty: 32 TABLET | Refills: 1 | Status: SHIPPED | OUTPATIENT
Start: 2024-06-03 | End: 2024-09-23

## 2024-06-03 NOTE — TELEPHONE ENCOUNTER
predniSONE (DELTASONE) 10 MG tablet   60 tablet 1 12/12/2023     Last Office Visit : 3-  Future Office visit:  none

## 2024-06-04 RX ORDER — COLCHICINE 0.6 MG/1
TABLET ORAL
Qty: 6 TABLET | Refills: 5 | OUTPATIENT
Start: 2024-06-04

## 2024-07-09 DIAGNOSIS — M1A.9XX1 TOPHACEOUS GOUT: ICD-10-CM

## 2024-07-10 RX ORDER — COLCHICINE 0.6 MG/1
TABLET ORAL
Qty: 3 TABLET | OUTPATIENT
Start: 2024-07-10

## 2024-07-10 RX ORDER — COLCHICINE 0.6 MG/1
TABLET ORAL
Qty: 6 TABLET | Refills: 2 | Status: SHIPPED | OUTPATIENT
Start: 2024-07-10 | End: 2024-09-23

## 2024-07-10 NOTE — TELEPHONE ENCOUNTER
Gout Agents Protocol [id number] 03:31 AM   Protocol Details ALT on file in past 12 months    Medication indicated for associated diagnosis   myloidosis              ulcer of mouth              Bechet's syndrome              Pericarditis          colchicine (MITIGARE) 0.6 MG capsule (Discontinued)   90 capsule 1 7/7/2023 7/28/2023 --    Sig - Route: Take 1 capsule (0.6 mg) by mouth daily - Oral     Routing refill request to provider for review/approval because:  Drug not active on patient's medication list

## 2024-09-22 ENCOUNTER — HEALTH MAINTENANCE LETTER (OUTPATIENT)
Age: 29
End: 2024-09-22

## 2024-09-23 ENCOUNTER — MYC REFILL (OUTPATIENT)
Dept: RHEUMATOLOGY | Facility: CLINIC | Age: 29
End: 2024-09-23
Payer: COMMERCIAL

## 2024-09-23 ENCOUNTER — MYC REFILL (OUTPATIENT)
Dept: FAMILY MEDICINE | Facility: CLINIC | Age: 29
End: 2024-09-23
Payer: COMMERCIAL

## 2024-09-23 DIAGNOSIS — M1A.9XX1 TOPHACEOUS GOUT: ICD-10-CM

## 2024-09-24 RX ORDER — PREDNISONE 10 MG/1
TABLET ORAL
Qty: 32 TABLET | Refills: 1 | Status: SHIPPED | OUTPATIENT
Start: 2024-09-24

## 2024-09-27 RX ORDER — COLCHICINE 0.6 MG/1
TABLET ORAL
Qty: 6 TABLET | Refills: 0 | Status: SHIPPED | OUTPATIENT
Start: 2024-09-27

## 2024-09-27 RX ORDER — ALLOPURINOL 100 MG/1
TABLET ORAL
Qty: 90 TABLET | Refills: 1 | Status: SHIPPED | OUTPATIENT
Start: 2024-09-27

## 2024-09-27 RX ORDER — ALLOPURINOL 300 MG/1
300 TABLET ORAL DAILY
Qty: 90 TABLET | Refills: 1 | Status: SHIPPED | OUTPATIENT
Start: 2024-09-27

## 2024-09-28 NOTE — TELEPHONE ENCOUNTER
allopurinol (ZYLOPRIM) 100 MG tablet 90 tablet 3 3/14/2024     allopurinol (ZYLOPRIM) 300 MG tablet 90 tablet 3 3/14/2024     colchicine (COLCRYS) 0.6 MG tablet 6 tablet 2 7/10/2024   Remaining refills sent to requested pharmacy.

## 2024-10-30 ENCOUNTER — MYC REFILL (OUTPATIENT)
Dept: FAMILY MEDICINE | Facility: CLINIC | Age: 29
End: 2024-10-30
Payer: COMMERCIAL

## 2024-10-30 DIAGNOSIS — M1A.9XX1 TOPHACEOUS GOUT: ICD-10-CM

## 2024-10-30 RX ORDER — ALLOPURINOL 300 MG/1
300 TABLET ORAL DAILY
Qty: 90 TABLET | Refills: 1 | Status: SHIPPED | OUTPATIENT
Start: 2024-10-30

## 2024-10-30 RX ORDER — ALLOPURINOL 100 MG/1
TABLET ORAL
Qty: 90 TABLET | Refills: 1 | Status: CANCELLED | OUTPATIENT
Start: 2024-10-30

## 2024-10-30 RX ORDER — PREDNISONE 10 MG/1
TABLET ORAL
Qty: 32 TABLET | Refills: 1 | Status: CANCELLED | OUTPATIENT
Start: 2024-10-30

## 2024-10-30 NOTE — TELEPHONE ENCOUNTER
Allopurinol 300mg  Last Written Prescription Date:  9/27/24  Last Fill Quantity: 90,  # refills: 1   Last office visit: Visit date not found ; last virtual visit: Visit date not found with prescribing provider:  Mark Anthony   Future Office Visit:  `no follow up scheduled    I called the pharmacy and they did not receive the refill for   Allopurinol sent on 9/27/24.  Will resend.     Tammie Rapp RN

## 2025-01-09 DIAGNOSIS — M1A.9XX1 TOPHACEOUS GOUT: ICD-10-CM

## 2025-01-09 RX ORDER — COLCHICINE 0.6 MG/1
CAPSULE ORAL
Qty: 90 CAPSULE | Refills: 1 | Status: SHIPPED | OUTPATIENT
Start: 2025-01-09

## 2025-01-09 RX ORDER — PREDNISONE 10 MG/1
TABLET ORAL
Qty: 32 TABLET | Refills: 1 | Status: SHIPPED | OUTPATIENT
Start: 2025-01-09

## 2025-02-08 DIAGNOSIS — M1A.9XX1 TOPHACEOUS GOUT: ICD-10-CM

## 2025-02-13 RX ORDER — COLCHICINE 0.6 MG/1
CAPSULE ORAL
Qty: 90 CAPSULE | Refills: 1 | OUTPATIENT
Start: 2025-02-13

## 2025-04-01 ENCOUNTER — MYC REFILL (OUTPATIENT)
Dept: RHEUMATOLOGY | Facility: CLINIC | Age: 30
End: 2025-04-01
Payer: COMMERCIAL

## 2025-04-01 DIAGNOSIS — M1A.9XX1 TOPHACEOUS GOUT: ICD-10-CM

## 2025-04-01 RX ORDER — ALLOPURINOL 300 MG/1
300 TABLET ORAL DAILY
Qty: 90 TABLET | Refills: 1 | Status: CANCELLED | OUTPATIENT
Start: 2025-04-01

## 2025-04-01 RX ORDER — COLCHICINE 0.6 MG/1
CAPSULE ORAL
Qty: 90 CAPSULE | Refills: 1 | Status: CANCELLED | OUTPATIENT
Start: 2025-04-01

## 2025-04-01 RX ORDER — PREDNISONE 10 MG/1
TABLET ORAL
Qty: 32 TABLET | Refills: 1 | Status: CANCELLED | OUTPATIENT
Start: 2025-04-01

## 2025-04-01 RX ORDER — ALLOPURINOL 100 MG/1
TABLET ORAL
Qty: 90 TABLET | Refills: 1 | Status: CANCELLED | OUTPATIENT
Start: 2025-04-01

## 2025-04-02 ENCOUNTER — VIRTUAL VISIT (OUTPATIENT)
Dept: INTERNAL MEDICINE | Facility: CLINIC | Age: 30
End: 2025-04-02
Payer: COMMERCIAL

## 2025-04-02 DIAGNOSIS — M1A.9XX1 TOPHACEOUS GOUT: ICD-10-CM

## 2025-04-02 PROCEDURE — 98005 SYNCH AUDIO-VIDEO EST LOW 20: CPT | Performed by: INTERNAL MEDICINE

## 2025-04-02 PROCEDURE — 1126F AMNT PAIN NOTED NONE PRSNT: CPT | Mod: 95 | Performed by: INTERNAL MEDICINE

## 2025-04-02 RX ORDER — ALLOPURINOL 100 MG/1
TABLET ORAL
Qty: 90 TABLET | Refills: 1 | Status: SHIPPED | OUTPATIENT
Start: 2025-04-02

## 2025-04-02 RX ORDER — PREDNISONE 10 MG/1
TABLET ORAL
Qty: 32 TABLET | Refills: 1 | Status: SHIPPED | OUTPATIENT
Start: 2025-04-02

## 2025-04-02 RX ORDER — COLCHICINE 0.6 MG/1
TABLET ORAL
Qty: 6 TABLET | Refills: 3 | Status: SHIPPED | OUTPATIENT
Start: 2025-04-02

## 2025-04-02 RX ORDER — ALLOPURINOL 300 MG/1
300 TABLET ORAL DAILY
Qty: 90 TABLET | Refills: 1 | Status: SHIPPED | OUTPATIENT
Start: 2025-04-02

## 2025-04-02 NOTE — TELEPHONE ENCOUNTER
"Patient asking for new colchicine RX.     Per Dr Hopson's last notes, 3/14/24, \"3. use colchicine 0.6 mg twice daily for 3 days as needed for gouty flare, not every day.\"    Of note, patient was seen by PCP yesterday and they refilled allopurinol 100mg and 300mg along with prednisone.  They did not renew the colchicine.  Notes are in Epic for review.     RX is pended.     Tammie Rapp RN                      "

## 2025-04-02 NOTE — PROGRESS NOTES
Katya is a 30 year old who is being evaluated via a billable video visit.    How would you like to obtain your AVS? MyChart  If the video visit is dropped, the invitation should be resent by: Text to cell phone: [phone]  Will anyone else be joining your video visit? No      Assessment & Plan     (M1A.9XX1) Tophaceous gout  Comment: hx of, has been generally well controlled on daily allopurinol 400mg. Occasional steroid use for flares, much less common now. Has not seen primary care in years.  Plan: allopurinol (ZYLOPRIM) 100 MG tablet,         allopurinol (ZYLOPRIM) 300 MG tablet,         predniSONE (DELTASONE) 10 MG tablet        Renewed for 6 months as bridge to re-establish within primary care, he feels that he will likely return to his prior clinic for this.  Further HM items can be addressed at that time.    The longitudinal plan of care for the diagnosis(es)/condition(s) as documented were addressed during this visit. Due to the added complexity in care, I will continue to support Katya in the subsequent management and with ongoing continuity of care.                 Subjective   Katya is a 30 year old, presenting for the following health issues:  Medication Refill (Requesting his medications prednisone and allopurinol he's taking for gout be renewed)      4/2/2025     7:26 AM   Additional Questions   Roomed by mitzy fermin     Medication Refill    History of Present Illness       Reason for visit:  Med refills   He is taking medications regularly.                    Objective    Vitals - Patient Reported  Pain Score: No Pain (0)        Physical Exam   GENERAL: alert and no distress  EYES: Eyes grossly normal to inspection.  No discharge or erythema, or obvious scleral/conjunctival abnormalities.  RESP: No audible wheeze, cough, or visible cyanosis.    SKIN: Visible skin clear. No significant rash, abnormal pigmentation or lesions.  NEURO: Cranial nerves grossly intact.  Mentation and speech appropriate for  age.  PSYCH: Appropriate affect, tone, and pace of words          Video-Visit Details    Type of service:  Video Visit   Originating Location (pt. Location): Home    Distant Location (provider location):  On-site  Platform used for Video Visit: [facility]  Signed Electronically by: Brian Burkett MD

## 2025-05-11 ENCOUNTER — MYC REFILL (OUTPATIENT)
Dept: INTERNAL MEDICINE | Facility: CLINIC | Age: 30
End: 2025-05-11
Payer: COMMERCIAL

## 2025-05-11 DIAGNOSIS — M1A.9XX1 TOPHACEOUS GOUT: ICD-10-CM

## 2025-05-12 RX ORDER — PREDNISONE 10 MG/1
TABLET ORAL
Qty: 32 TABLET | Refills: 1 | OUTPATIENT
Start: 2025-05-12

## 2025-07-30 ENCOUNTER — MYC REFILL (OUTPATIENT)
Dept: RHEUMATOLOGY | Facility: CLINIC | Age: 30
End: 2025-07-30
Payer: COMMERCIAL

## 2025-07-30 ENCOUNTER — MYC REFILL (OUTPATIENT)
Dept: INTERNAL MEDICINE | Facility: CLINIC | Age: 30
End: 2025-07-30
Payer: COMMERCIAL

## 2025-07-30 DIAGNOSIS — M1A.9XX1 TOPHACEOUS GOUT: ICD-10-CM

## 2025-07-30 RX ORDER — COLCHICINE 0.6 MG/1
TABLET ORAL
Qty: 6 TABLET | Refills: 3 | OUTPATIENT
Start: 2025-07-30

## 2025-07-31 RX ORDER — PREDNISONE 10 MG/1
TABLET ORAL
Qty: 32 TABLET | Refills: 0 | Status: SHIPPED | OUTPATIENT
Start: 2025-07-31